# Patient Record
Sex: FEMALE | Race: WHITE | NOT HISPANIC OR LATINO | Employment: OTHER | URBAN - NONMETROPOLITAN AREA
[De-identification: names, ages, dates, MRNs, and addresses within clinical notes are randomized per-mention and may not be internally consistent; named-entity substitution may affect disease eponyms.]

---

## 2018-05-25 ENCOUNTER — OFFICE VISIT (OUTPATIENT)
Dept: GASTROENTEROLOGY | Facility: CLINIC | Age: 83
End: 2018-05-25

## 2018-05-25 VITALS
DIASTOLIC BLOOD PRESSURE: 80 MMHG | OXYGEN SATURATION: 96 % | HEART RATE: 72 BPM | SYSTOLIC BLOOD PRESSURE: 157 MMHG | HEIGHT: 66 IN | BODY MASS INDEX: 32.69 KG/M2 | WEIGHT: 203.4 LBS

## 2018-05-25 DIAGNOSIS — K52.832 LYMPHOCYTIC COLITIS: Primary | ICD-10-CM

## 2018-05-25 DIAGNOSIS — R12 HEARTBURN: ICD-10-CM

## 2018-05-25 DIAGNOSIS — R19.7 DIARRHEA, UNSPECIFIED TYPE: ICD-10-CM

## 2018-05-25 PROCEDURE — 99204 OFFICE O/P NEW MOD 45 MIN: CPT | Performed by: PHYSICIAN ASSISTANT

## 2018-05-25 RX ORDER — ERGOCALCIFEROL 1.25 MG/1
50000 CAPSULE ORAL WEEKLY
COMMUNITY

## 2018-05-25 RX ORDER — LISINOPRIL 20 MG/1
TABLET ORAL
COMMUNITY
Start: 2013-05-24

## 2018-05-25 RX ORDER — PIOGLITAZONEHYDROCHLORIDE 15 MG/1
15 TABLET ORAL DAILY
COMMUNITY

## 2018-05-25 RX ORDER — ALLOPURINOL 300 MG/1
TABLET ORAL
COMMUNITY
Start: 2012-11-21

## 2018-05-25 RX ORDER — ASPIRIN 325 MG
325 TABLET ORAL DAILY
COMMUNITY

## 2018-05-25 RX ORDER — GLIPIZIDE 5 MG/1
5 TABLET ORAL
COMMUNITY

## 2018-05-25 RX ORDER — SERTRALINE HYDROCHLORIDE 100 MG/1
TABLET, FILM COATED ORAL
COMMUNITY
Start: 2013-01-22

## 2018-05-25 RX ORDER — BUDESONIDE 3 MG/1
CAPSULE, COATED PELLETS ORAL
Qty: 154 CAPSULE | Refills: 0 | Status: SHIPPED | OUTPATIENT
Start: 2018-05-25 | End: 2018-06-04

## 2018-05-25 RX ORDER — OMEPRAZOLE 20 MG/1
20 CAPSULE, DELAYED RELEASE ORAL DAILY
COMMUNITY
End: 2018-05-25 | Stop reason: SINTOL

## 2018-05-25 RX ORDER — CARVEDILOL 12.5 MG/1
TABLET ORAL 2 TIMES DAILY
COMMUNITY
Start: 2013-03-19

## 2018-05-25 RX ORDER — RANITIDINE 300 MG/1
300 TABLET ORAL 2 TIMES DAILY PRN
Qty: 60 TABLET | Refills: 5 | Status: SHIPPED | OUTPATIENT
Start: 2018-05-25 | End: 2020-11-09

## 2018-05-25 RX ORDER — AMLODIPINE BESYLATE 5 MG/1
TABLET ORAL DAILY
COMMUNITY
Start: 2013-01-10

## 2018-05-25 NOTE — PROGRESS NOTES
Chief Complaint   Patient presents with   • Diarrhea       Tsering Alberto is a 83 y.o. female who presents to the office today as a new patient for evaluation of Diarrhea.    HPI  Patient reports diarrhea has been present for the past 6-8 months. She has noticed that some foods such as lettuce and green onions make her symptoms worse and also cause bloating. Some mornings, she has severe diarrhea with urgency. This problem has been getting progressively worse. Her sister passed away in the Fall of 2017 and her  passed away with cancer 2 years ago. She relates her worsening of symptoms to stress. She also reports that her stools are occurring every day. Her stools are sometimes with gas at first then watery stools, #7 on the Miner Stool Scale 1-4 times per day. Stools are malodorous and yellow in color, she has seen undigested food there in the past. There has also been mucous in her stool. No rectal burning. Still has gallbladder. She will take Imodium for relief of diarrhea, up to 1 pill per day. No blood has been seen in stool. She reports that Dr. Nielson completed sigmoidoscopy and then later had barium enema which were reported as normal in Oct 2017. There is no known family history of colon cancer or colon polyps. No family history of GI disease. Patient has history of diverticulosis but no personal history of colon polyps. She was given Bentyl and Pepto Bismol in the past which did not seem to help. Also currently taking Probiotics.    Denies abdominal pain, bloating or belching. Appetite is good. Takes Prilosec as needed for heartburn.     Review of Systems   Constitutional: Positive for fatigue. Negative for chills and fever.   HENT: Negative for congestion, sore throat, trouble swallowing and voice change.    Eyes: Negative.    Respiratory: Positive for shortness of breath. Negative for cough and wheezing.    Cardiovascular: Positive for leg swelling. Negative for chest pain and palpitations.    Gastrointestinal: Positive for diarrhea. Negative for abdominal distention, abdominal pain, anal bleeding, blood in stool, constipation, nausea, rectal pain and vomiting.   Endocrine: Positive for heat intolerance. Negative for cold intolerance.   Genitourinary: Negative for difficulty urinating and pelvic pain.   Musculoskeletal: Negative for arthralgias, back pain and myalgias.   Skin: Negative for rash and wound.   Allergic/Immunologic: Positive for environmental allergies. Negative for food allergies.   Neurological: Negative for dizziness and headaches.   Hematological: Bruises/bleeds easily.   Psychiatric/Behavioral: Negative for sleep disturbance. The patient is nervous/anxious.      Past Medical History:   Diagnosis Date   • Diabetes mellitus    • Hypertension      Past Surgical History:   Procedure Laterality Date   • CARDIAC SURGERY     • CATARACT EXTRACTION     • COLONOSCOPY     • REPLACEMENT TOTAL KNEE       Family History   Problem Relation Age of Onset   • Ovarian cancer Sister    • Breast cancer Other      Social History   Substance Use Topics   • Smoking status: Never Smoker   • Smokeless tobacco: Never Used   • Alcohol use No       CURRENT MEDICATION:  •  allopurinol (ZYLOPRIM) 300 MG tablet, Take  by mouth., Disp: , Rfl:   •  amLODIPine (NORVASC) 5 MG tablet, Take  by mouth Daily., Disp: , Rfl:   •  aspirin 325 MG tablet, Take 325 mg by mouth Daily., Disp: , Rfl:   •  carvedilol (COREG) 12.5 MG tablet, Take  by mouth 2 (Two) Times a Day., Disp: , Rfl:   •  glipiZIDE (GLUCOTROL) 5 MG tablet, Take 5 mg by mouth 2 (Two) Times a Day Before Meals., Disp: , Rfl:   •  lisinopril (PRINIVIL,ZESTRIL) 20 MG tablet, Take  by mouth., Disp: , Rfl:   •  pioglitazone (ACTOS) 15 MG tablet, Take 15 mg by mouth Daily., Disp: , Rfl:   •  sertraline (ZOLOFT) 100 MG tablet, Take  by mouth., Disp: , Rfl:   •  vitamin D (ERGOCALCIFEROL) 26844 units capsule capsule, Take 50,000 Units by mouth 1 (One) Time Per Week.,  "Disp: , Rfl:     ALLERGIES:  Penicillins    VISIT VITALS:  /80   Pulse 72   Ht 166.4 cm (65.5\")   Wt 92.3 kg (203 lb 6.4 oz)   SpO2 96%   BMI 33.33 kg/m²      Physical Exam   Constitutional: She is oriented to person, place, and time. She appears well-developed and well-nourished. No distress.   HENT:   Head: Normocephalic and atraumatic.   Nose: Nose normal.   Mouth/Throat: Oropharynx is clear and moist.   Eyes: Conjunctivae are normal. Right eye exhibits no discharge. Left eye exhibits no discharge. No scleral icterus.   Neck: Normal range of motion. No JVD present.   Cardiovascular: Normal rate, regular rhythm and normal heart sounds.  Exam reveals no gallop and no friction rub.    No murmur heard.  Pulmonary/Chest: Effort normal and breath sounds normal. No respiratory distress. She has no wheezes. She has no rales. She exhibits no tenderness.   Abdominal: Soft. Bowel sounds are normal. She exhibits no mass. There is no tenderness.   Musculoskeletal: Normal range of motion. She exhibits deformity (kyphosis). She exhibits no edema.   Neurological: She is alert and oriented to person, place, and time. Coordination normal.   Skin: Skin is warm and dry. No rash noted. She is not diaphoretic. No erythema.   Psychiatric: She has a normal mood and affect. Her behavior is normal. Judgment and thought content normal.   Vitals reviewed.      Assessment/Plan     1. Lymphocytic colitis    2. Diarrhea, unspecified type    3. Heartburn      Patient's operative note and pathology were requested and reviewed. Her random colon biopsies showed microscopic colitis (lymphocytic). She will start appropriate treatment with budesonide taper for 8 weeks. If diarrhea continues, she can be placed on long term maintenance dose of Budesonide 3 mg once daily. She will call with concerns. Information on this condition has been given to her.     Because PPIs can contribute to this condition, I have asked her to stop Prilosec and " start Zantac only as needed up to 2 times per day for relief of heartburn.     New Medications Ordered This Visit   Medications   • Budesonide (ENTOCORT EC) 3 MG 24 hr capsule     Sig: 3 tablets by mouth daily for 6 weeks, then 2 tablets by mouth daily for 2 weeks     Dispense:  154 capsule     Refill:  0   • raNITIdine (ZANTAC) 300 MG tablet     Sig: Take 1 tablet by mouth 2 (Two) Times a Day As Needed for Indigestion or Heartburn.     Dispense:  60 tablet     Refill:  5             Return in about 2 months (around 7/25/2018) for recheck colitis.      Electronically signed 5/25/2018 1:58 PM  Ese Guerin PA-C, [facility]

## 2018-05-29 ENCOUNTER — TELEPHONE (OUTPATIENT)
Dept: GASTROENTEROLOGY | Facility: CLINIC | Age: 83
End: 2018-05-29

## 2018-05-29 NOTE — TELEPHONE ENCOUNTER
Patient called this morning and said the Budesonide that you had prescribed her is way to expensive. She wanted to know if she could have something else called in?    Please and Thank you

## 2018-05-30 ENCOUNTER — TELEPHONE (OUTPATIENT)
Dept: GASTROENTEROLOGY | Facility: CLINIC | Age: 83
End: 2018-05-30

## 2018-05-30 RX ORDER — MESALAMINE 0.38 G/1
CAPSULE, EXTENDED RELEASE ORAL
Qty: 120 CAPSULE | Refills: 5 | Status: SHIPPED | OUTPATIENT
Start: 2018-05-30 | End: 2018-10-05

## 2018-06-01 ENCOUNTER — DOCUMENTATION (OUTPATIENT)
Dept: GASTROENTEROLOGY | Facility: CLINIC | Age: 83
End: 2018-06-01

## 2018-06-01 NOTE — PROGRESS NOTES
Patient called stating medication that was prescribed to her, Budesonide, was not helping diarrhea. She took one dose 5/30/18 and daily dose 5/31/18 and one dose today. I told her maybe it had not had time to start working and maybe she should wait and call back on Monday and let us know how it was working.

## 2018-06-04 ENCOUNTER — TELEPHONE (OUTPATIENT)
Dept: GASTROENTEROLOGY | Facility: CLINIC | Age: 83
End: 2018-06-04

## 2018-06-04 DIAGNOSIS — K52.832 LYMPHOCYTIC COLITIS: Primary | ICD-10-CM

## 2018-06-04 NOTE — TELEPHONE ENCOUNTER
Patient called and stated that the medicine you gave her for diarrhea isn't working. She stated that she is still having really bad diarrhea and it is waking her up through the night.

## 2018-06-04 NOTE — TELEPHONE ENCOUNTER
I called and spoke with patient. She could not afford $400 co-pay for the Budesonide. She was given Apriso and has been taking 4 pills per day but all throughout the day. I asked her to take the 4 pills all together 1 time daily then call back in a couple of days.

## 2018-06-04 NOTE — TELEPHONE ENCOUNTER
Sent Uceris for patient as another option. Called pharmacy and they said this name brand was not on her formulary and not covered. MA- can we look into patient assistance for Uceris for patient? She said she would be willing to apply.

## 2018-06-07 NOTE — TELEPHONE ENCOUNTER
Spoke with patient and let her know that insurance denied uceris. I told her I had patient assistance forms filled out and all she needed to do was come in and sign them. She stated that she would come in today or tomorrow to sign them. She stated that she is still getting up in the middle of the night to have a BM. I told her that hopefully when she gets this medicine she will feel better.

## 2018-06-15 ENCOUNTER — TELEPHONE (OUTPATIENT)
Dept: GASTROENTEROLOGY | Facility: CLINIC | Age: 83
End: 2018-06-15

## 2018-06-15 DIAGNOSIS — K52.832 LYMPHOCYTIC COLITIS: Primary | ICD-10-CM

## 2018-06-15 RX ORDER — CHOLESTYRAMINE 4 G/9G
4 POWDER, FOR SUSPENSION ORAL 2 TIMES DAILY
Qty: 378 G | Refills: 11 | Status: SHIPPED | OUTPATIENT
Start: 2018-06-15 | End: 2018-10-05

## 2018-06-15 NOTE — TELEPHONE ENCOUNTER
Ese, patient called checking on the status of patient assist. For Uceris. I called and they told me it should be completed in the next 24-48 hours , patient says she is miserable w/ mucus in stools,diarrhea. Says she cannot go on like this. She said the Apriso did not help her at all. Are there any samples of uceris or something else she can try while she is waiting on a decision from pt assist?

## 2018-06-15 NOTE — TELEPHONE ENCOUNTER
I have sent cholestyramine to pharmacy. Have her take this separate from all of her other medications.

## 2018-06-25 ENCOUNTER — OFFICE VISIT (OUTPATIENT)
Dept: GASTROENTEROLOGY | Facility: CLINIC | Age: 83
End: 2018-06-25

## 2018-06-25 VITALS
SYSTOLIC BLOOD PRESSURE: 148 MMHG | WEIGHT: 194 LBS | OXYGEN SATURATION: 99 % | HEART RATE: 70 BPM | DIASTOLIC BLOOD PRESSURE: 72 MMHG | BODY MASS INDEX: 30.45 KG/M2 | HEIGHT: 67 IN

## 2018-06-25 DIAGNOSIS — K52.9 NONSPECIFIC COLITIS: Primary | ICD-10-CM

## 2018-06-25 DIAGNOSIS — R19.7 DIARRHEA, UNSPECIFIED TYPE: ICD-10-CM

## 2018-06-25 PROCEDURE — 99214 OFFICE O/P EST MOD 30 MIN: CPT | Performed by: PHYSICIAN ASSISTANT

## 2018-07-11 ENCOUNTER — TELEPHONE (OUTPATIENT)
Dept: GASTROENTEROLOGY | Facility: CLINIC | Age: 83
End: 2018-07-11

## 2018-07-16 ENCOUNTER — TELEPHONE (OUTPATIENT)
Dept: GASTROENTEROLOGY | Facility: CLINIC | Age: 83
End: 2018-07-16

## 2018-07-16 NOTE — TELEPHONE ENCOUNTER
La from patients insurance called needing to do a peer to peer for Uceris. Her direct line is [phone]. Thank  You.

## 2018-07-18 NOTE — TELEPHONE ENCOUNTER
Do we have more samples to give to the patient?? I cannot get in contact to complete peer to peer. They said deadline was today.

## 2018-07-18 NOTE — TELEPHONE ENCOUNTER
Dr. Kong called about peer to peer and left a message this morning. I called back and when I pressed 2 for provider, I was disconnected several times.

## 2018-07-19 ENCOUNTER — TELEPHONE (OUTPATIENT)
Dept: GASTROENTEROLOGY | Facility: CLINIC | Age: 83
End: 2018-07-19

## 2018-07-20 NOTE — TELEPHONE ENCOUNTER
Please call [facility] rep to ask for more samples if possible to give patient by her next appt.

## 2018-08-02 ENCOUNTER — OFFICE VISIT (OUTPATIENT)
Dept: GASTROENTEROLOGY | Facility: CLINIC | Age: 83
End: 2018-08-02

## 2018-08-02 VITALS
SYSTOLIC BLOOD PRESSURE: 144 MMHG | HEART RATE: 72 BPM | HEIGHT: 67 IN | OXYGEN SATURATION: 96 % | DIASTOLIC BLOOD PRESSURE: 74 MMHG | WEIGHT: 198.8 LBS | BODY MASS INDEX: 31.2 KG/M2

## 2018-08-02 DIAGNOSIS — R19.7 DIARRHEA, UNSPECIFIED TYPE: ICD-10-CM

## 2018-08-02 DIAGNOSIS — K52.839 MICROSCOPIC COLITIS, UNSPECIFIED MICROSCOPIC COLITIS TYPE: Primary | ICD-10-CM

## 2018-08-02 PROCEDURE — 99214 OFFICE O/P EST MOD 30 MIN: CPT | Performed by: PHYSICIAN ASSISTANT

## 2018-09-06 ENCOUNTER — OFFICE VISIT (OUTPATIENT)
Dept: GASTROENTEROLOGY | Facility: CLINIC | Age: 83
End: 2018-09-06

## 2018-09-06 VITALS
SYSTOLIC BLOOD PRESSURE: 168 MMHG | DIASTOLIC BLOOD PRESSURE: 77 MMHG | HEIGHT: 67 IN | WEIGHT: 202 LBS | BODY MASS INDEX: 31.71 KG/M2 | OXYGEN SATURATION: 96 % | HEART RATE: 75 BPM

## 2018-09-06 DIAGNOSIS — R19.7 DIARRHEA, UNSPECIFIED TYPE: ICD-10-CM

## 2018-09-06 DIAGNOSIS — K52.839 MICROSCOPIC COLITIS, UNSPECIFIED MICROSCOPIC COLITIS TYPE: Primary | ICD-10-CM

## 2018-09-06 PROBLEM — K59.00 CONSTIPATION: Status: ACTIVE | Noted: 2018-09-06

## 2018-09-06 PROCEDURE — 99212 OFFICE O/P EST SF 10 MIN: CPT | Performed by: PHYSICIAN ASSISTANT

## 2018-09-06 NOTE — PROGRESS NOTES
Chief Complaint   Patient presents with   • colitis       Tsering Alberto is a 83 y.o. female who presents to the office today for follow up appointment for colitis  .    HPI    The patient was seen for a follow up visit of colitis.  She will complete her Uceris 9 mg tx next week and then she will continue on 6 mg.  Patient denies all GI symptoms.  She reports that she is feeling great.       Review of Systems   Constitutional: Negative for fatigue.   HENT: Negative for trouble swallowing.    Eyes: Negative for visual disturbance.   Respiratory: Positive for shortness of breath.    Cardiovascular: Negative for chest pain.   Gastrointestinal: Negative for abdominal distention, abdominal pain, anal bleeding, blood in stool, constipation, diarrhea, nausea, rectal pain and vomiting.   Endocrine: Positive for heat intolerance.   Genitourinary: Negative.    Musculoskeletal: Negative for arthralgias, back pain and myalgias.   Skin: Negative.    Allergic/Immunologic: Negative for food allergies.   Neurological: Positive for light-headedness. Negative for dizziness.   Hematological: Bruises/bleeds easily.   Psychiatric/Behavioral: The patient is nervous/anxious.        ACTIVE PROBLEMS:   Specialty Problems        Gastroenterology Problems    Nonspecific colitis        Microscopic colitis              PAST MEDICAL HISTORY:  Past Medical History:   Diagnosis Date   • Diabetes mellitus (CMS/HCC)    • Hypertension        SURGICAL HISTORY:  Past Surgical History:   Procedure Laterality Date   • CARDIAC SURGERY     • CATARACT EXTRACTION     • COLONOSCOPY     • REPLACEMENT TOTAL KNEE         FAMILY HISTORY:  Family History   Problem Relation Age of Onset   • Ovarian cancer Sister    • Breast cancer Other        SOCIAL HISTORY:  Social History   Substance Use Topics   • Smoking status: Never Smoker   • Smokeless tobacco: Never Used   • Alcohol use No       CURRENT MEDICATION:    Current Outpatient Prescriptions:   •  allopurinol  "(ZYLOPRIM) 300 MG tablet, Take  by mouth., Disp: , Rfl:   •  amLODIPine (NORVASC) 5 MG tablet, Take  by mouth Daily., Disp: , Rfl:   •  aspirin 325 MG tablet, Take 325 mg by mouth Daily., Disp: , Rfl:   •  Budesonide (UCERIS) 9 MG tablet sustained-release 24 hour, Take 9 mg by mouth Daily., Disp: 30 tablet, Rfl: 2  •  carvedilol (COREG) 12.5 MG tablet, Take  by mouth 2 (Two) Times a Day., Disp: , Rfl:   •  cholestyramine (QUESTRAN) 4 GM/DOSE powder, Take 1 packet by mouth 2 (Two) Times a Day. mixed with a liquid, Disp: 378 g, Rfl: 11  •  glipiZIDE (GLUCOTROL) 5 MG tablet, Take 5 mg by mouth 2 (Two) Times a Day Before Meals., Disp: , Rfl:   •  lisinopril (PRINIVIL,ZESTRIL) 20 MG tablet, Take  by mouth., Disp: , Rfl:   •  mesalamine (APRISO) 0.375 g 24 hr capsule, Take 4 capsules daily, Disp: 120 capsule, Rfl: 5  •  pioglitazone (ACTOS) 15 MG tablet, Take 15 mg by mouth Daily., Disp: , Rfl:   •  raNITIdine (ZANTAC) 300 MG tablet, Take 1 tablet by mouth 2 (Two) Times a Day As Needed for Indigestion or Heartburn., Disp: 60 tablet, Rfl: 5  •  sertraline (ZOLOFT) 100 MG tablet, Take  by mouth., Disp: , Rfl:   •  vitamin D (ERGOCALCIFEROL) 72329 units capsule capsule, Take 50,000 Units by mouth 1 (One) Time Per Week., Disp: , Rfl:     ALLERGIES:  Penicillins    VISIT VITALS:  /77   Pulse 75   Ht 170.2 cm (67\")   Wt 91.6 kg (202 lb)   SpO2 96%   BMI 31.64 kg/m²     Physical Exam   Constitutional: She is oriented to person, place, and time. She appears well-developed and well-nourished. No distress.   HENT:   Head: Normocephalic and atraumatic.   Right Ear: External ear normal.   Left Ear: External ear normal.   Nose: Nose normal.   Mouth/Throat: Oropharynx is clear and moist. No oropharyngeal exudate.   Eyes: Pupils are equal, round, and reactive to light. Conjunctivae and EOM are normal. Right eye exhibits no discharge. Left eye exhibits no discharge. No scleral icterus.   Neck: Normal range of motion. Neck " supple. No JVD present. No tracheal deviation present. No thyromegaly present.   Cardiovascular: Normal rate, regular rhythm, normal heart sounds and intact distal pulses.  Exam reveals no gallop and no friction rub.    No murmur heard.  Pulmonary/Chest: Effort normal and breath sounds normal. No stridor. No respiratory distress. She has no wheezes. She has no rales. She exhibits no tenderness.   Abdominal: Soft. Bowel sounds are normal. She exhibits no distension and no mass. There is no tenderness. There is no rebound and no guarding. No hernia.   Genitourinary: Rectal exam shows guaiac negative stool.   Musculoskeletal: She exhibits no edema, tenderness or deformity.   Lymphadenopathy:     She has no cervical adenopathy.   Neurological: She is alert and oriented to person, place, and time. She has normal reflexes. She displays normal reflexes. No cranial nerve deficit. She exhibits normal muscle tone. Coordination normal.   Skin: Skin is warm and dry. No rash noted. She is not diaphoretic. No erythema. No pallor.   Psychiatric: She has a normal mood and affect. Her behavior is normal. Judgment and thought content normal.       Assessment/Plan      Diagnosis Plan   1. Microscopic colitis, unspecified microscopic colitis type     2. Diarrhea, unspecified type       The patient is doing well with Uceris treatment.  She would like to remain on the 9 mg Uceris for longer due to the positive change she has had in her life.  We will extend the Uceris for another month.  She voiced understanding and agreement.    Return in about 1 month (around 10/6/2018) for Recheck.         Patient's Body mass index is 31.64 kg/m². BMI is above normal parameters. Recommendations include: educational material.      SEAN Rodriguez

## 2018-09-11 ENCOUNTER — TELEPHONE (OUTPATIENT)
Dept: GASTROENTEROLOGY | Facility: CLINIC | Age: 83
End: 2018-09-11

## 2018-09-11 NOTE — TELEPHONE ENCOUNTER
Ana Pauladallas asked that I call patient and make sure that she knew to keep taking Uceris 9mg.I tried to call patient but there was no answer and no way to leave a message.

## 2018-10-05 ENCOUNTER — OFFICE VISIT (OUTPATIENT)
Dept: GASTROENTEROLOGY | Facility: CLINIC | Age: 83
End: 2018-10-05

## 2018-10-05 VITALS
HEART RATE: 71 BPM | DIASTOLIC BLOOD PRESSURE: 75 MMHG | BODY MASS INDEX: 31.71 KG/M2 | OXYGEN SATURATION: 90 % | HEIGHT: 67 IN | WEIGHT: 202 LBS | SYSTOLIC BLOOD PRESSURE: 167 MMHG

## 2018-10-05 DIAGNOSIS — K52.832 LYMPHOCYTIC COLITIS: Primary | ICD-10-CM

## 2018-10-05 PROCEDURE — 99213 OFFICE O/P EST LOW 20 MIN: CPT | Performed by: PHYSICIAN ASSISTANT

## 2018-10-05 RX ORDER — BUDESONIDE 3 MG/1
3 CAPSULE, COATED PELLETS ORAL EVERY MORNING
Qty: 30 CAPSULE | Refills: 3 | Status: SHIPPED | OUTPATIENT
Start: 2018-10-05 | End: 2018-10-24 | Stop reason: SDUPTHER

## 2018-10-05 RX ORDER — BUDESONIDE 9 MG/1
9 TABLET, FILM COATED, EXTENDED RELEASE ORAL DAILY
Qty: 30 TABLET | Refills: 1 | Status: SHIPPED | OUTPATIENT
Start: 2018-10-05 | End: 2018-11-16 | Stop reason: SDUPTHER

## 2018-10-05 NOTE — PROGRESS NOTES
: 1935    Chief Complaint   Patient presents with   • Lymphocytic Colitis       Tsering Alberto is a 83 y.o. female who presents to the office today as a follow up appointment regarding Lymphocytic Colitis.    History of Present Illness:  Today, she states that she is feeling great and her symptoms of severe diarrhea are much improved with Uceris 9 mg once daily for treatment of microscopic (lymphocytic colitis).  BMs are occurring 1 time daily now and are soft and formed. Fecal urgency resolved as well as fecal incontinence. Denies current abdominal pain, nausea or vomiting. Appetite is good and denies any recent weight loss. She has occasionally skipped 1 day between doses of Uceris. She has not tried to discontinue this medication yet.  She has been taking this medication once daily since 2018 and is getting it free of cost through the company via financial assistance. She tried previously to get Entocort 3 mg tablet on taper schedule but this was too expensive for her and the appeal was denied by her insurance. She has tried Apriso, cholestyramine, Bentyl and Pepto Bismol in the past which did not seem to help. Also currently taking Probiotics.    She reports that Dr. Nielson completed sigmoidoscopy (path showed microscopic colitis) and then later had barium enema which was reported as normal in Oct 2017. There is no known family history of colon cancer or colon polyps. No family history of GI disease. Patient has history of diverticulosis but no personal history of colon polyps.    Review of Systems   Constitutional: Negative for fatigue.   HENT: Negative for trouble swallowing.    Eyes: Negative for visual disturbance.   Respiratory: Positive for shortness of breath.    Cardiovascular: Negative for chest pain.   Gastrointestinal: Negative for abdominal distention, abdominal pain, anal bleeding, blood in stool, constipation, diarrhea, nausea, rectal pain and vomiting.   Endocrine: Positive for heat  intolerance.   Genitourinary: Negative.    Musculoskeletal: Negative for arthralgias, back pain and myalgias.   Skin: Negative.    Allergic/Immunologic: Negative for food allergies.   Neurological: Positive for light-headedness. Negative for dizziness.   Hematological: Bruises/bleeds easily.   Psychiatric/Behavioral: The patient is nervous/anxious.        Past Medical History:   Diagnosis Date   • Diabetes mellitus (CMS/HCC)    • Hypertension        Past Surgical History:   Procedure Laterality Date   • CARDIAC SURGERY     • CATARACT EXTRACTION     • COLONOSCOPY     • REPLACEMENT TOTAL KNEE         Family History   Problem Relation Age of Onset   • Ovarian cancer Sister    • Breast cancer Other        Social History     Social History   • Marital status: Unknown     Social History Main Topics   • Smoking status: Never Smoker   • Smokeless tobacco: Never Used   • Alcohol use No   • Drug use: Unknown     Other Topics Concern   • Not on file       Current Outpatient Prescriptions:   •  allopurinol (ZYLOPRIM) 300 MG tablet, Take  by mouth., Disp: , Rfl:   •  amLODIPine (NORVASC) 5 MG tablet, Take  by mouth Daily., Disp: , Rfl:   •  aspirin 325 MG tablet, Take 325 mg by mouth Daily., Disp: , Rfl:   •  Budesonide (UCERIS) 9 MG tablet sustained-release 24 hour, Take 9 mg by mouth Daily., Disp: 30 tablet, Rfl: 2  •  carvedilol (COREG) 12.5 MG tablet, Take  by mouth 2 (Two) Times a Day., Disp: , Rfl:   •  glipiZIDE (GLUCOTROL) 5 MG tablet, Take 5 mg by mouth 2 (Two) Times a Day Before Meals., Disp: , Rfl:   •  lisinopril (PRINIVIL,ZESTRIL) 20 MG tablet, Take  by mouth., Disp: , Rfl:   •  pioglitazone (ACTOS) 15 MG tablet, Take 15 mg by mouth Daily., Disp: , Rfl:   •  raNITIdine (ZANTAC) 300 MG tablet, Take 1 tablet by mouth 2 (Two) Times a Day As Needed for Indigestion or Heartburn., Disp: 60 tablet, Rfl: 5  •  sertraline (ZOLOFT) 100 MG tablet, Take  by mouth., Disp: , Rfl:   •  vitamin D (ERGOCALCIFEROL) 05960 units  "capsule capsule, Take 50,000 Units by mouth 1 (One) Time Per Week., Disp: , Rfl:     Allergies:   Penicillins    Vitals:  /75 (BP Location: Right arm, Patient Position: Sitting, Cuff Size: Adult)   Pulse 71   Ht 170.2 cm (67\")   Wt 91.6 kg (202 lb)   SpO2 90%   BMI 31.64 kg/m²     Physical Exam   Constitutional: She is oriented to person, place, and time. She appears well-developed and well-nourished. No distress.   HENT:   Head: Normocephalic and atraumatic.   Nose: Nose normal.   Mouth/Throat: Oropharynx is clear and moist.   Eyes: Conjunctivae are normal. Right eye exhibits no discharge. Left eye exhibits no discharge. No scleral icterus.   Neck: Normal range of motion. No JVD present.   Cardiovascular: Normal rate, regular rhythm and normal heart sounds.  Exam reveals no gallop and no friction rub.    No murmur heard.  Pulmonary/Chest: Effort normal and breath sounds normal. No respiratory distress. She has no wheezes. She has no rales. She exhibits no tenderness.   Abdominal: Soft. Bowel sounds are normal. She exhibits no mass. There is no tenderness.   Musculoskeletal: She exhibits edema (mild ansarca). She exhibits no deformity.   Slow gait, ambulates with cane.   Neurological: She is alert and oriented to person, place, and time. Coordination normal.   Skin: Skin is warm and dry. No rash noted. She is not diaphoretic. No erythema.   Scattered ecchymosis bilateral upper extremities   Psychiatric: She has a normal mood and affect. Her behavior is normal. Judgment and thought content normal.   Vitals reviewed.      Assessment/Plan:  1. Lymphocytic colitis      She has had dramatic relief from severe diarrhea related to microscopic (lymphocytic colitis). She has been taking Budesonide for over 3 months. This exceeds treatment duration of 8 weeks and she has developed considerable swelling on my physical exam. Long term use of steroids can lead to swelling and also brittle bones along with other " complications. I would like her to be able to take the recommended maintenance dose of budesonide at 3 mg once daily. She will make attempt to discontinue Uceris completely for now. If she is unable to discontinue it, she will take 1 capsule every 2-3 days. She cannot afford Entocort at this time, we will investigate patient assistance for her.     New Medications Ordered This Visit   Medications   • Budesonide (ENTOCORT EC) 3 MG 24 hr capsule     Sig: Take 1 capsule by mouth Every Morning.     Dispense:  30 capsule     Refill:  3   • Budesonide ER (UCERIS) 9 MG tablet sustained-release 24 hour     Sig: Take 9 mg by mouth Daily.     Dispense:  30 tablet     Refill:  1           Return in about 3 months (around 1/5/2019) for recheck microscopic colitis.      Electronically signed 10/5/2018 4:50 PM  Ese Guerin PA-C, [facility]

## 2018-10-08 ENCOUNTER — TELEPHONE (OUTPATIENT)
Dept: GASTROENTEROLOGY | Facility: CLINIC | Age: 83
End: 2018-10-08

## 2018-10-08 DIAGNOSIS — K52.832 LYMPHOCYTIC COLITIS: ICD-10-CM

## 2018-10-16 ENCOUNTER — TELEPHONE (OUTPATIENT)
Dept: UROLOGY | Facility: CLINIC | Age: 83
End: 2018-10-16

## 2018-10-16 DIAGNOSIS — K52.832 LYMPHOCYTIC COLITIS: ICD-10-CM

## 2018-10-24 RX ORDER — BUDESONIDE 3 MG/1
3 CAPSULE, COATED PELLETS ORAL EVERY MORNING
Qty: 30 CAPSULE | Refills: 5 | Status: SHIPPED | OUTPATIENT
Start: 2018-10-24 | End: 2019-09-10 | Stop reason: SDUPTHER

## 2018-11-07 ENCOUNTER — TELEPHONE (OUTPATIENT)
Dept: GASTROENTEROLOGY | Facility: CLINIC | Age: 83
End: 2018-11-07

## 2018-11-07 NOTE — TELEPHONE ENCOUNTER
I do not have this paperwork. I faxed it in for patient assistance and put it in the scan folder. This was just faxed on 10/24/2018. They have 30 days I believe to respond.

## 2018-11-16 RX ORDER — BUDESONIDE 9 MG/1
9 TABLET, FILM COATED, EXTENDED RELEASE ORAL DAILY
Qty: 30 TABLET | Refills: 1 | Status: SHIPPED | OUTPATIENT
Start: 2018-11-16 | End: 2019-09-10

## 2019-09-09 ENCOUNTER — TELEPHONE (OUTPATIENT)
Dept: GASTROENTEROLOGY | Facility: CLINIC | Age: 84
End: 2019-09-09

## 2019-09-09 DIAGNOSIS — K52.832 LYMPHOCYTIC COLITIS: ICD-10-CM

## 2019-09-09 NOTE — TELEPHONE ENCOUNTER
Ese, patient called and said that she received a letter from the patient assistance program and she can't get any help with Uceris. She wanted to know if there is a generic for it? Or can she try something else?        Please and Thank you

## 2019-09-10 RX ORDER — BUDESONIDE 3 MG/1
3 CAPSULE, COATED PELLETS ORAL EVERY MORNING
Qty: 30 CAPSULE | Refills: 11 | Status: SHIPPED | OUTPATIENT
Start: 2019-09-10 | End: 2020-11-09 | Stop reason: SDUPTHER

## 2019-09-10 NOTE — TELEPHONE ENCOUNTER
We have tried the generic in the past and it was too expensive. She may want to consider trying again to go off of it. I will re-send budesonide, she can compare the prices.

## 2019-09-11 NOTE — TELEPHONE ENCOUNTER
Ese, I spoke with Tsering and she said the Entocort was still expensive. ($200.00). She said that if she had to, she would  Pay for it but, that's a lot of money for her.

## 2019-09-11 NOTE — TELEPHONE ENCOUNTER
May want to call pharmacy for her to see if there are any discounts that she can have. Otherwise, she will either need to pay for it or try to go off of it for a while. She could even try taking it every couple of days instead of daily in order to save money.

## 2020-11-09 ENCOUNTER — TELEPHONE (OUTPATIENT)
Dept: GASTROENTEROLOGY | Facility: CLINIC | Age: 85
End: 2020-11-09

## 2020-11-09 ENCOUNTER — OFFICE VISIT (OUTPATIENT)
Dept: GASTROENTEROLOGY | Facility: CLINIC | Age: 85
End: 2020-11-09

## 2020-11-09 VITALS
HEIGHT: 67 IN | DIASTOLIC BLOOD PRESSURE: 93 MMHG | TEMPERATURE: 98 F | SYSTOLIC BLOOD PRESSURE: 175 MMHG | HEART RATE: 76 BPM | WEIGHT: 209 LBS | OXYGEN SATURATION: 98 % | BODY MASS INDEX: 32.8 KG/M2

## 2020-11-09 DIAGNOSIS — K52.832 LYMPHOCYTIC COLITIS: Primary | ICD-10-CM

## 2020-11-09 PROCEDURE — 99213 OFFICE O/P EST LOW 20 MIN: CPT | Performed by: PHYSICIAN ASSISTANT

## 2020-11-09 RX ORDER — BUDESONIDE 3 MG/1
3 CAPSULE, COATED PELLETS ORAL EVERY MORNING
Qty: 30 CAPSULE | Refills: 11 | Status: SHIPPED | OUTPATIENT
Start: 2020-11-09

## 2020-11-09 NOTE — PROGRESS NOTES
: 1935    Chief Complaint   Patient presents with   • Diarrhea       Tsering Alberto is a 85 y.o. female with PMH of lymphocytic colitis who presents to the office today as a follow up appointment regarding Diarrhea.    History of Present Illness:  She has been feeling well over the past couple of years, has not been seen in our GI clinic since 10/2018. She has been taking Uceris 9 mg tab only as needed with great relief from diarrhea. She previously was prescribed budesonide 3 mg tablets but her insurance would not cover it and it was very expensive for her. She has daily stools, sometimes has sudden onset abdominal pain with diarrhea and fecal urgency more than 4 or 5 times daily. Has been taking what she had left of samples over the past couple of years.     Review of Systems   Constitutional: Positive for fatigue.   HENT: Negative for sore throat and trouble swallowing.    Eyes: Negative.    Respiratory: Negative for chest tightness.    Cardiovascular: Positive for leg swelling. Negative for chest pain.   Gastrointestinal: Negative for abdominal distention, abdominal pain, anal bleeding, blood in stool, constipation, diarrhea, nausea, rectal pain and vomiting.   Endocrine: Negative.    Genitourinary: Negative for difficulty urinating.   Musculoskeletal: Positive for back pain. Negative for neck pain.   Skin: Negative.    Allergic/Immunologic: Negative for environmental allergies and food allergies.   Neurological: Negative for dizziness and headaches.   Hematological: Bruises/bleeds easily.   Psychiatric/Behavioral: Positive for sleep disturbance.     I have reviewed and confirmed the accuracy of the ROS as documented by the MA/LPN/RN Ese Guerin PA-C    Past Medical History:   Diagnosis Date   • Diabetes mellitus (CMS/HCC)    • Hypertension        Past Surgical History:   Procedure Laterality Date   • CARDIAC SURGERY     • CATARACT EXTRACTION     • COLONOSCOPY     • REPLACEMENT TOTAL KNEE    "      Family History   Problem Relation Age of Onset   • Ovarian cancer Sister    • Breast cancer Other        Social History     Socioeconomic History   • Marital status: Unknown     Spouse name: Not on file   • Number of children: Not on file   • Years of education: Not on file   • Highest education level: Not on file   Tobacco Use   • Smoking status: Never Smoker   • Smokeless tobacco: Never Used   Substance and Sexual Activity   • Alcohol use: No       Current Outpatient Medications:   •  allopurinol (ZYLOPRIM) 300 MG tablet, Take  by mouth., Disp: , Rfl:   •  amLODIPine (NORVASC) 5 MG tablet, Take  by mouth Daily., Disp: , Rfl:   •  aspirin 325 MG tablet, Take 325 mg by mouth Daily., Disp: , Rfl:   •  Budesonide (UCeris) 9 mg once daily PRN diarrhea  •  carvedilol (COREG) 12.5 MG tablet, Take  by mouth 2 (Two) Times a Day., Disp: , Rfl:   •  glipiZIDE (GLUCOTROL) 5 MG tablet, Take 5 mg by mouth 2 (Two) Times a Day Before Meals., Disp: , Rfl:   •  lisinopril (PRINIVIL,ZESTRIL) 20 MG tablet, Take  by mouth., Disp: , Rfl:   •  pioglitazone (ACTOS) 15 MG tablet, Take 15 mg by mouth Daily., Disp: , Rfl:   •  sertraline (ZOLOFT) 100 MG tablet, Take  by mouth., Disp: , Rfl:   •  vitamin D (ERGOCALCIFEROL) 63318 units capsule capsule, Take 50,000 Units by mouth 1 (One) Time Per Week., Disp: , Rfl:     Allergies:   Penicillins    Vitals:  /93   Pulse 76   Temp 98 °F (36.7 °C)   Ht 170.2 cm (67\")   Wt 94.8 kg (209 lb)   SpO2 98%   BMI 32.73 kg/m²     Physical Exam  Vitals signs reviewed.   Constitutional:       General: She is not in acute distress.     Appearance: Normal appearance. She is well-developed. She is not ill-appearing or diaphoretic.   HENT:      Head: Normocephalic and atraumatic.      Right Ear: External ear normal.      Left Ear: External ear normal.      Nose: Nose normal.      Mouth/Throat:      Comments: Wearing a mask  Eyes:      General: No scleral icterus.        Right eye: No discharge. "         Left eye: No discharge.      Conjunctiva/sclera: Conjunctivae normal.   Neck:      Musculoskeletal: Normal range of motion.      Vascular: No JVD.   Cardiovascular:      Rate and Rhythm: Normal rate and regular rhythm.      Heart sounds: Normal heart sounds. No murmur. No friction rub. No gallop.    Pulmonary:      Effort: Pulmonary effort is normal. No respiratory distress.      Breath sounds: Normal breath sounds. No wheezing or rales.   Chest:      Chest wall: No tenderness.   Abdominal:      General: Bowel sounds are normal. There is no distension.      Palpations: Abdomen is soft. There is no mass.      Tenderness: There is no abdominal tenderness. There is no guarding.   Musculoskeletal: Normal range of motion.         General: No deformity.      Right lower leg: Edema present.      Left lower leg: Edema present.      Comments: Slow gait, ambulates with cane   Skin:     General: Skin is warm and dry.      Findings: No erythema or rash.   Neurological:      Mental Status: She is alert and oriented to person, place, and time.      Coordination: Coordination normal.   Psychiatric:         Mood and Affect: Mood normal.         Behavior: Behavior normal.         Thought Content: Thought content normal.         Judgment: Judgment normal.     Results Review:  Dr. Nielson completed sigmoidoscopy (path showed microscopic colitis) and then later had barium enema which was reported as normal in Oct 2017.    Assessment:  1. Lymphocytic colitis      Plan:  Maintenance dosing for microscopic colitis may be more affordable and just as effective for diarrhea relief. We no longer have Uceris samples in office. She should consider taking this medication daily or at least every other day for best relief.     New Medications Ordered This Visit   Medications   • Budesonide (Entocort EC) 3 MG 24 hr capsule     Sig: Take 1 capsule by mouth Every Morning.     Dispense:  30 capsule     Refill:  11           Return if symptoms  worsen or fail to improve.      Electronically signed 11/11/2020 13:50 MOOKIE Guerin PA-C  [facility]

## 2021-03-16 ENCOUNTER — IMMUNIZATION (OUTPATIENT)
Dept: VACCINE CLINIC | Facility: HOSPITAL | Age: 86
End: 2021-03-16

## 2021-03-16 PROCEDURE — 0001A: CPT | Performed by: INTERNAL MEDICINE

## 2021-03-16 PROCEDURE — 91300 HC SARSCOV02 VAC 30MCG/0.3ML IM: CPT | Performed by: INTERNAL MEDICINE

## 2021-04-06 ENCOUNTER — IMMUNIZATION (OUTPATIENT)
Dept: VACCINE CLINIC | Facility: HOSPITAL | Age: 86
End: 2021-04-06

## 2021-04-06 PROCEDURE — 0002A: CPT | Performed by: INTERNAL MEDICINE

## 2021-04-06 PROCEDURE — 91300 HC SARSCOV02 VAC 30MCG/0.3ML IM: CPT | Performed by: INTERNAL MEDICINE

## 2022-03-18 NOTE — PROGRESS NOTES
Chief Complaint   Patient presents with   • colitis       Tsering Alberto is a 83 y.o. female who presents to the office today for follow up appointment for colitis  .    HPI    The patient was seen for a follow up visit of colitis.  She was started on Uceris last visit and reports that it is completely controlling her symptoms.  She denies diarrhea, nausea, vomiting, abdominal pain, constipation, diarrhea, hematochezia and melena.        Review of Systems   Constitutional: Negative for fatigue.   HENT: Negative for trouble swallowing.    Eyes: Negative.    Respiratory: Negative.    Cardiovascular: Negative for chest pain.   Gastrointestinal: Negative for abdominal distention, abdominal pain, anal bleeding, blood in stool, constipation, diarrhea, nausea, rectal pain and vomiting.   Endocrine: Positive for heat intolerance. Negative for cold intolerance.   Genitourinary: Negative.    Musculoskeletal: Negative for arthralgias, back pain and myalgias.   Skin: Negative.    Allergic/Immunologic: Negative for environmental allergies.   Neurological: Positive for dizziness. Negative for light-headedness.   Hematological: Bruises/bleeds easily.   Psychiatric/Behavioral: Negative for sleep disturbance. The patient is nervous/anxious.        ACTIVE PROBLEMS:   Specialty Problems        Gastroenterology Problems    Nonspecific colitis              PAST MEDICAL HISTORY:  Past Medical History:   Diagnosis Date   • Diabetes mellitus (CMS/HCC)    • Hypertension        SURGICAL HISTORY:  Past Surgical History:   Procedure Laterality Date   • CARDIAC SURGERY     • CATARACT EXTRACTION     • COLONOSCOPY     • REPLACEMENT TOTAL KNEE         FAMILY HISTORY:  Family History   Problem Relation Age of Onset   • Ovarian cancer Sister    • Breast cancer Other        SOCIAL HISTORY:  Social History   Substance Use Topics   • Smoking status: Never Smoker   • Smokeless tobacco: Never Used   • Alcohol use No       CURRENT MEDICATION:    Current  "Outpatient Prescriptions:   •  allopurinol (ZYLOPRIM) 300 MG tablet, Take  by mouth., Disp: , Rfl:   •  amLODIPine (NORVASC) 5 MG tablet, Take  by mouth Daily., Disp: , Rfl:   •  aspirin 325 MG tablet, Take 325 mg by mouth Daily., Disp: , Rfl:   •  Budesonide (UCERIS) 9 MG tablet sustained-release 24 hour, Take 9 mg by mouth Daily., Disp: 30 tablet, Rfl: 2  •  Budesonide 9 MG tablet sustained-release 24 hour, Take 9 mg by mouth Daily., Disp: 28 tablet, Rfl: 1  •  carvedilol (COREG) 12.5 MG tablet, Take  by mouth 2 (Two) Times a Day., Disp: , Rfl:   •  cholestyramine (QUESTRAN) 4 GM/DOSE powder, Take 1 packet by mouth 2 (Two) Times a Day. mixed with a liquid, Disp: 378 g, Rfl: 11  •  glipiZIDE (GLUCOTROL) 5 MG tablet, Take 5 mg by mouth 2 (Two) Times a Day Before Meals., Disp: , Rfl:   •  lisinopril (PRINIVIL,ZESTRIL) 20 MG tablet, Take  by mouth., Disp: , Rfl:   •  mesalamine (APRISO) 0.375 g 24 hr capsule, Take 4 capsules daily, Disp: 120 capsule, Rfl: 5  •  pioglitazone (ACTOS) 15 MG tablet, Take 15 mg by mouth Daily., Disp: , Rfl:   •  raNITIdine (ZANTAC) 300 MG tablet, Take 1 tablet by mouth 2 (Two) Times a Day As Needed for Indigestion or Heartburn., Disp: 60 tablet, Rfl: 5  •  sertraline (ZOLOFT) 100 MG tablet, Take  by mouth., Disp: , Rfl:   •  vitamin D (ERGOCALCIFEROL) 45379 units capsule capsule, Take 50,000 Units by mouth 1 (One) Time Per Week., Disp: , Rfl:     ALLERGIES:  Penicillins    VISIT VITALS:  /74   Pulse 72   Ht 170.2 cm (67\")   Wt 90.2 kg (198 lb 12.8 oz)   SpO2 96%   BMI 31.14 kg/m²     Physical Exam   Constitutional: She is oriented to person, place, and time. She appears well-developed and well-nourished. No distress.   HENT:   Head: Normocephalic and atraumatic.   Right Ear: External ear normal.   Left Ear: External ear normal.   Nose: Nose normal.   Mouth/Throat: Oropharynx is clear and moist. No oropharyngeal exudate.   Eyes: Pupils are equal, round, and reactive to light. " Conjunctivae and EOM are normal. Right eye exhibits no discharge. Left eye exhibits no discharge. No scleral icterus.   Neck: Normal range of motion. Neck supple. No JVD present. No tracheal deviation present. No thyromegaly present.   Cardiovascular: Normal rate, regular rhythm, normal heart sounds and intact distal pulses.  Exam reveals no gallop and no friction rub.    No murmur heard.  Pulmonary/Chest: Effort normal and breath sounds normal. No stridor. No respiratory distress. She has no wheezes. She has no rales. She exhibits no tenderness.   Abdominal: Soft. Bowel sounds are normal. She exhibits no distension and no mass. There is no tenderness. There is no rebound and no guarding. No hernia.   Genitourinary: Rectal exam shows guaiac negative stool.   Musculoskeletal: She exhibits no edema, tenderness or deformity.   Lymphadenopathy:     She has no cervical adenopathy.   Neurological: She is alert and oriented to person, place, and time. She has normal reflexes. She displays normal reflexes. No cranial nerve deficit. She exhibits normal muscle tone. Coordination normal.   Skin: Skin is warm and dry. No rash noted. She is not diaphoretic. No erythema. No pallor.   Psychiatric: She has a normal mood and affect. Her behavior is normal. Judgment and thought content normal.       Assessment/Plan      Diagnosis Plan   1. Microscopic colitis, unspecified microscopic colitis type     2. Diarrhea, unspecified type       The patient will continue Uceris 9 mg until her 8 weeks has been completed.  She will drop to 6 mg x 2 weeks.  Patient will return in 6 weeks.    Return in about 8 weeks (around 9/27/2018) for Recheck.         Patient's Body mass index is 31.14 kg/m². BMI is above normal parameters. Recommendations include: educational material.      SEAN Rodriguez   Cephalexin Pregnancy And Lactation Text: This medication is Pregnancy Category B and considered safe during pregnancy.  It is also excreted in breast milk but can be used safely for shorter doses.

## 2023-02-22 ENCOUNTER — TRANSCRIBE ORDERS (OUTPATIENT)
Dept: ADMINISTRATIVE | Facility: HOSPITAL | Age: 88
End: 2023-02-22
Payer: MEDICARE

## 2023-02-22 ENCOUNTER — LAB (OUTPATIENT)
Dept: LAB | Facility: HOSPITAL | Age: 88
End: 2023-02-22
Payer: MEDICARE

## 2023-02-22 DIAGNOSIS — R30.0 DYSURIA: Primary | ICD-10-CM

## 2023-02-22 DIAGNOSIS — R30.0 DYSURIA: ICD-10-CM

## 2023-02-22 PROCEDURE — 81001 URINALYSIS AUTO W/SCOPE: CPT

## 2023-02-22 PROCEDURE — 87086 URINE CULTURE/COLONY COUNT: CPT

## 2023-02-23 LAB
BACTERIA UR QL AUTO: ABNORMAL /HPF
BILIRUB UR QL STRIP: NEGATIVE
CLARITY UR: ABNORMAL
COLOR UR: YELLOW
GLUCOSE UR STRIP-MCNC: ABNORMAL MG/DL
HGB UR QL STRIP.AUTO: ABNORMAL
HYALINE CASTS UR QL AUTO: ABNORMAL /LPF
KETONES UR QL STRIP: NEGATIVE
LEUKOCYTE ESTERASE UR QL STRIP.AUTO: ABNORMAL
NITRITE UR QL STRIP: NEGATIVE
PH UR STRIP.AUTO: 5.5 [PH] (ref 5–8)
PROT UR QL STRIP: ABNORMAL
RBC # UR STRIP: ABNORMAL /HPF
REF LAB TEST METHOD: ABNORMAL
SP GR UR STRIP: 1.02 (ref 1–1.03)
SQUAMOUS #/AREA URNS HPF: ABNORMAL /HPF
UROBILINOGEN UR QL STRIP: ABNORMAL
WBC # UR STRIP: ABNORMAL /HPF
YEAST URNS QL MICRO: ABNORMAL /HPF

## 2023-02-24 LAB — BACTERIA SPEC AEROBE CULT: ABNORMAL

## 2023-07-21 ENCOUNTER — LAB (OUTPATIENT)
Dept: LAB | Facility: HOSPITAL | Age: 88
End: 2023-07-21
Payer: MEDICARE

## 2023-07-21 DIAGNOSIS — E11.22 TYPE II DIABETES MELLITUS WITH END-STAGE RENAL DISEASE: ICD-10-CM

## 2023-07-21 DIAGNOSIS — N18.6 TYPE II DIABETES MELLITUS WITH END-STAGE RENAL DISEASE: ICD-10-CM

## 2023-07-21 DIAGNOSIS — I10 ESSENTIAL (PRIMARY) HYPERTENSION: ICD-10-CM

## 2023-07-21 LAB
ANION GAP SERPL CALCULATED.3IONS-SCNC: 13.2 MMOL/L (ref 5–15)
BUN SERPL-MCNC: 25 MG/DL (ref 8–23)
BUN/CREAT SERPL: 20.8 (ref 7–25)
CALCIUM SPEC-SCNC: 9.8 MG/DL (ref 8.6–10.5)
CHLORIDE SERPL-SCNC: 105 MMOL/L (ref 98–107)
CO2 SERPL-SCNC: 26.8 MMOL/L (ref 22–29)
CREAT SERPL-MCNC: 1.2 MG/DL (ref 0.57–1)
EGFRCR SERPLBLD CKD-EPI 2021: 43.9 ML/MIN/1.73
GLUCOSE SERPL-MCNC: 150 MG/DL (ref 65–99)
POTASSIUM SERPL-SCNC: 4.4 MMOL/L (ref 3.5–5.2)
SODIUM SERPL-SCNC: 145 MMOL/L (ref 136–145)

## 2023-07-21 PROCEDURE — 80048 BASIC METABOLIC PNL TOTAL CA: CPT

## 2023-07-21 PROCEDURE — 36415 COLL VENOUS BLD VENIPUNCTURE: CPT

## 2023-11-20 ENCOUNTER — LAB REQUISITION (OUTPATIENT)
Dept: LAB | Facility: HOSPITAL | Age: 88
End: 2023-11-20
Payer: MEDICARE

## 2023-11-20 DIAGNOSIS — N39.0 URINARY TRACT INFECTION, SITE NOT SPECIFIED: ICD-10-CM

## 2023-11-20 DIAGNOSIS — N18.9 CHRONIC KIDNEY DISEASE, UNSPECIFIED: ICD-10-CM

## 2023-11-20 DIAGNOSIS — A41.9 SEPSIS, UNSPECIFIED ORGANISM: ICD-10-CM

## 2023-11-20 LAB
BACTERIA UR QL AUTO: ABNORMAL /HPF
BILIRUB UR QL STRIP: NEGATIVE
CLARITY UR: CLEAR
COLOR UR: YELLOW
GLUCOSE UR STRIP-MCNC: NEGATIVE MG/DL
HGB UR QL STRIP.AUTO: ABNORMAL
HYALINE CASTS UR QL AUTO: ABNORMAL /LPF
KETONES UR QL STRIP: NEGATIVE
LEUKOCYTE ESTERASE UR QL STRIP.AUTO: ABNORMAL
NITRITE UR QL STRIP: NEGATIVE
PH UR STRIP.AUTO: <=5 [PH] (ref 5–8)
PROT UR QL STRIP: ABNORMAL
RBC # UR STRIP: ABNORMAL /HPF
REF LAB TEST METHOD: ABNORMAL
SP GR UR STRIP: 1.02 (ref 1–1.03)
SQUAMOUS #/AREA URNS HPF: ABNORMAL /HPF
UROBILINOGEN UR QL STRIP: ABNORMAL
WBC # UR STRIP: ABNORMAL /HPF
YEAST URNS QL MICRO: ABNORMAL /HPF

## 2023-11-20 PROCEDURE — 87186 SC STD MICRODIL/AGAR DIL: CPT | Performed by: INTERNAL MEDICINE

## 2023-11-20 PROCEDURE — 87086 URINE CULTURE/COLONY COUNT: CPT | Performed by: INTERNAL MEDICINE

## 2023-11-20 PROCEDURE — 81001 URINALYSIS AUTO W/SCOPE: CPT | Performed by: INTERNAL MEDICINE

## 2023-11-20 PROCEDURE — 87077 CULTURE AEROBIC IDENTIFY: CPT | Performed by: INTERNAL MEDICINE

## 2023-11-22 LAB
BACTERIA SPEC AEROBE CULT: ABNORMAL
BACTERIA SPEC AEROBE CULT: ABNORMAL

## 2023-12-08 ENCOUNTER — LAB REQUISITION (OUTPATIENT)
Dept: LAB | Facility: HOSPITAL | Age: 88
End: 2023-12-08
Payer: MEDICARE

## 2023-12-08 DIAGNOSIS — N18.9 CHRONIC KIDNEY DISEASE, UNSPECIFIED: ICD-10-CM

## 2023-12-08 LAB — SARS-COV-2 RNA RESP QL NAA+PROBE: NOT DETECTED

## 2023-12-08 PROCEDURE — 87635 SARS-COV-2 COVID-19 AMP PRB: CPT | Performed by: INTERNAL MEDICINE

## 2024-01-01 ENCOUNTER — APPOINTMENT (OUTPATIENT)
Dept: CT IMAGING | Facility: HOSPITAL | Age: 89
DRG: 871 | End: 2024-01-01
Payer: MEDICARE

## 2024-01-01 ENCOUNTER — APPOINTMENT (OUTPATIENT)
Dept: GENERAL RADIOLOGY | Facility: HOSPITAL | Age: 89
DRG: 871 | End: 2024-01-01
Payer: MEDICARE

## 2024-01-01 ENCOUNTER — LAB REQUISITION (OUTPATIENT)
Dept: LAB | Facility: HOSPITAL | Age: 89
End: 2024-01-01
Payer: MEDICARE

## 2024-01-01 ENCOUNTER — LAB REQUISITION (OUTPATIENT)
Dept: LAB | Facility: HOSPITAL | Age: 89
DRG: 871 | End: 2024-01-01
Payer: MEDICARE

## 2024-01-01 ENCOUNTER — HOSPITAL ENCOUNTER (INPATIENT)
Facility: HOSPITAL | Age: 89
LOS: 3 days | DRG: 871 | End: 2024-12-30
Attending: EMERGENCY MEDICINE | Admitting: STUDENT IN AN ORGANIZED HEALTH CARE EDUCATION/TRAINING PROGRAM
Payer: MEDICARE

## 2024-01-01 VITALS
DIASTOLIC BLOOD PRESSURE: 40 MMHG | RESPIRATION RATE: 16 BRPM | SYSTOLIC BLOOD PRESSURE: 85 MMHG | HEIGHT: 70 IN | WEIGHT: 154.54 LBS | OXYGEN SATURATION: 99 % | HEART RATE: 96 BPM | TEMPERATURE: 98.8 F | BODY MASS INDEX: 22.12 KG/M2

## 2024-01-01 DIAGNOSIS — Z79.2 LONG TERM (CURRENT) USE OF ANTIBIOTICS: ICD-10-CM

## 2024-01-01 DIAGNOSIS — B96.29 OTHER ESCHERICHIA COLI (E. COLI) AS THE CAUSE OF DISEASES CLASSIFIED ELSEWHERE: ICD-10-CM

## 2024-01-01 DIAGNOSIS — L08.9 LOCAL INFECTION OF THE SKIN AND SUBCUTANEOUS TISSUE, UNSPECIFIED: ICD-10-CM

## 2024-01-01 DIAGNOSIS — A41.9 SEPSIS, UNSPECIFIED ORGANISM: ICD-10-CM

## 2024-01-01 DIAGNOSIS — A41.9 SEPSIS WITH ENCEPHALOPATHY AND SEPTIC SHOCK, DUE TO UNSPECIFIED ORGANISM: Primary | ICD-10-CM

## 2024-01-01 DIAGNOSIS — G93.41 SEPSIS WITH ENCEPHALOPATHY AND SEPTIC SHOCK, DUE TO UNSPECIFIED ORGANISM: Primary | ICD-10-CM

## 2024-01-01 DIAGNOSIS — R65.21 SEPSIS WITH ENCEPHALOPATHY AND SEPTIC SHOCK, DUE TO UNSPECIFIED ORGANISM: Primary | ICD-10-CM

## 2024-01-01 LAB
A-A DO2: 17.4 MMHG (ref 0–300)
ALBUMIN SERPL-MCNC: 2.8 G/DL (ref 3.5–5.2)
ALBUMIN SERPL-MCNC: 3 G/DL (ref 3.5–5.2)
ALBUMIN/GLOB SERPL: 1 G/DL
ALBUMIN/GLOB SERPL: 1 G/DL
ALP SERPL-CCNC: 118 U/L (ref 39–117)
ALP SERPL-CCNC: 118 U/L (ref 39–117)
ALT SERPL W P-5'-P-CCNC: 18 U/L (ref 1–33)
ALT SERPL W P-5'-P-CCNC: 18 U/L (ref 1–33)
ANION GAP SERPL CALCULATED.3IONS-SCNC: 13.6 MMOL/L (ref 5–15)
ANION GAP SERPL CALCULATED.3IONS-SCNC: 14.6 MMOL/L (ref 5–15)
APTT PPP: 52.2 SECONDS (ref 26.5–34.5)
ARTERIAL PATENCY WRIST A: ABNORMAL
AST SERPL-CCNC: 33 U/L (ref 1–32)
AST SERPL-CCNC: 35 U/L (ref 1–32)
ATMOSPHERIC PRESS: 728 MMHG
BACTERIA BLD CULT: ABNORMAL
BACTERIA SPEC AEROBE CULT: ABNORMAL
BACTERIA UR QL AUTO: NORMAL /HPF
BASE EXCESS BLDA CALC-SCNC: 3.2 MMOL/L (ref 0–2)
BASOPHILS # BLD AUTO: 0.01 10*3/MM3 (ref 0–0.2)
BASOPHILS # BLD AUTO: 0.01 10*3/MM3 (ref 0–0.2)
BASOPHILS NFR BLD AUTO: 0.1 % (ref 0–1.5)
BASOPHILS NFR BLD AUTO: 0.2 % (ref 0–1.5)
BDY SITE: ABNORMAL
BILIRUB SERPL-MCNC: 0.2 MG/DL (ref 0–1.2)
BILIRUB SERPL-MCNC: <0.2 MG/DL (ref 0–1.2)
BILIRUB UR QL STRIP: NEGATIVE
BOTTLE TYPE: ABNORMAL
BUN SERPL-MCNC: 49 MG/DL (ref 8–23)
BUN SERPL-MCNC: 51 MG/DL (ref 8–23)
BUN/CREAT SERPL: 37.1 (ref 7–25)
BUN/CREAT SERPL: 43.2 (ref 7–25)
CALCIUM SPEC-SCNC: 7.4 MG/DL (ref 8.6–10.5)
CALCIUM SPEC-SCNC: 8.1 MG/DL (ref 8.6–10.5)
CHLORIDE SERPL-SCNC: 112 MMOL/L (ref 98–107)
CHLORIDE SERPL-SCNC: 115 MMOL/L (ref 98–107)
CK SERPL-CCNC: 98 U/L (ref 20–180)
CLARITY UR: CLEAR
CO2 BLDA-SCNC: 30.4 MMOL/L (ref 22–33)
CO2 SERPL-SCNC: 23.4 MMOL/L (ref 22–29)
CO2 SERPL-SCNC: 27.4 MMOL/L (ref 22–29)
COHGB MFR BLD: 1.4 % (ref 0–5)
COLOR UR: ABNORMAL
CREAT SERPL-MCNC: 1.18 MG/DL (ref 0.57–1)
CREAT SERPL-MCNC: 1.32 MG/DL (ref 0.57–1)
CRP SERPL-MCNC: 3.95 MG/DL (ref 0–0.5)
D-LACTATE SERPL-SCNC: 1 MMOL/L (ref 0.5–2)
D-LACTATE SERPL-SCNC: 1.4 MMOL/L (ref 0.5–2)
DEPRECATED RDW RBC AUTO: 59.7 FL (ref 37–54)
DEPRECATED RDW RBC AUTO: 60.6 FL (ref 37–54)
EGFRCR SERPLBLD CKD-EPI 2021: 38.9 ML/MIN/1.73
EGFRCR SERPLBLD CKD-EPI 2021: 44.5 ML/MIN/1.73
EOSINOPHIL # BLD AUTO: 0.04 10*3/MM3 (ref 0–0.4)
EOSINOPHIL # BLD AUTO: 0.18 10*3/MM3 (ref 0–0.4)
EOSINOPHIL NFR BLD AUTO: 0.4 % (ref 0.3–6.2)
EOSINOPHIL NFR BLD AUTO: 2.9 % (ref 0.3–6.2)
ERYTHROCYTE [DISTWIDTH] IN BLOOD BY AUTOMATED COUNT: 19 % (ref 12.3–15.4)
ERYTHROCYTE [DISTWIDTH] IN BLOOD BY AUTOMATED COUNT: 19.1 % (ref 12.3–15.4)
ERYTHROCYTE [SEDIMENTATION RATE] IN BLOOD: 50 MM/HR (ref 0–30)
GEN 5 1HR TROPONIN T REFLEX: 47 NG/L
GLOBULIN UR ELPH-MCNC: 2.8 GM/DL
GLOBULIN UR ELPH-MCNC: 2.9 GM/DL
GLUCOSE SERPL-MCNC: 101 MG/DL (ref 65–99)
GLUCOSE SERPL-MCNC: 85 MG/DL (ref 65–99)
GLUCOSE UR STRIP-MCNC: NEGATIVE MG/DL
GRAM STN SPEC: ABNORMAL
HCO3 BLDA-SCNC: 28.9 MMOL/L (ref 20–26)
HCT VFR BLD AUTO: 29.9 % (ref 34–46.6)
HCT VFR BLD AUTO: 31.3 % (ref 34–46.6)
HCT VFR BLD CALC: 29.3 % (ref 38–51)
HGB BLD-MCNC: 9.3 G/DL (ref 12–15.9)
HGB BLD-MCNC: 9.5 G/DL (ref 12–15.9)
HGB BLDA-MCNC: 9.6 G/DL (ref 13.5–17.5)
HGB UR QL STRIP.AUTO: NEGATIVE
HYALINE CASTS UR QL AUTO: NORMAL /LPF
IMM GRANULOCYTES # BLD AUTO: 0.05 10*3/MM3 (ref 0–0.05)
IMM GRANULOCYTES # BLD AUTO: 0.08 10*3/MM3 (ref 0–0.05)
IMM GRANULOCYTES NFR BLD AUTO: 0.8 % (ref 0–0.5)
IMM GRANULOCYTES NFR BLD AUTO: 0.9 % (ref 0–0.5)
INHALED O2 CONCENTRATION: 21 %
INR PPP: 1.79 (ref 0.9–1.1)
INR PPP: 1.85 (ref 0.9–1.1)
ISOLATED FROM: ABNORMAL
KETONES UR QL STRIP: ABNORMAL
LEUKOCYTE ESTERASE UR QL STRIP.AUTO: NEGATIVE
LYMPHOCYTES # BLD AUTO: 1.34 10*3/MM3 (ref 0.7–3.1)
LYMPHOCYTES # BLD AUTO: 1.36 10*3/MM3 (ref 0.7–3.1)
LYMPHOCYTES NFR BLD AUTO: 15.1 % (ref 19.6–45.3)
LYMPHOCYTES NFR BLD AUTO: 21.4 % (ref 19.6–45.3)
Lab: ABNORMAL
MAGNESIUM SERPL-MCNC: 1.1 MG/DL (ref 1.6–2.4)
MCH RBC QN AUTO: 26.8 PG (ref 26.6–33)
MCH RBC QN AUTO: 27.4 PG (ref 26.6–33)
MCHC RBC AUTO-ENTMCNC: 30.4 G/DL (ref 31.5–35.7)
MCHC RBC AUTO-ENTMCNC: 31.1 G/DL (ref 31.5–35.7)
MCV RBC AUTO: 87.9 FL (ref 79–97)
MCV RBC AUTO: 88.4 FL (ref 79–97)
METHGB BLD QL: 0.4 % (ref 0–3)
MODALITY: ABNORMAL
MONOCYTES # BLD AUTO: 0.43 10*3/MM3 (ref 0.1–0.9)
MONOCYTES # BLD AUTO: 1.04 10*3/MM3 (ref 0.1–0.9)
MONOCYTES NFR BLD AUTO: 11.6 % (ref 5–12)
MONOCYTES NFR BLD AUTO: 6.9 % (ref 5–12)
NEUTROPHILS NFR BLD AUTO: 4.26 10*3/MM3 (ref 1.7–7)
NEUTROPHILS NFR BLD AUTO: 6.46 10*3/MM3 (ref 1.7–7)
NEUTROPHILS NFR BLD AUTO: 67.8 % (ref 42.7–76)
NEUTROPHILS NFR BLD AUTO: 71.9 % (ref 42.7–76)
NITRITE UR QL STRIP: NEGATIVE
NRBC BLD AUTO-RTO: 0 /100 WBC (ref 0–0.2)
NRBC BLD AUTO-RTO: 0.6 /100 WBC (ref 0–0.2)
OXYHGB MFR BLDV: 91.9 % (ref 94–99)
PCO2 BLDA: 48.8 MM HG (ref 35–45)
PCO2 TEMP ADJ BLD: ABNORMAL MM[HG]
PH BLDA: 7.38 PH UNITS (ref 7.35–7.45)
PH UR STRIP.AUTO: <=5 [PH] (ref 5–8)
PH, TEMP CORRECTED: ABNORMAL
PLATELET # BLD AUTO: 281 10*3/MM3 (ref 140–450)
PLATELET # BLD AUTO: 311 10*3/MM3 (ref 140–450)
PMV BLD AUTO: 11.5 FL (ref 6–12)
PMV BLD AUTO: 11.7 FL (ref 6–12)
PO2 BLDA: 70.5 MM HG (ref 83–108)
PO2 TEMP ADJ BLD: ABNORMAL MM[HG]
POTASSIUM SERPL-SCNC: 2.7 MMOL/L (ref 3.5–5.2)
POTASSIUM SERPL-SCNC: 3.8 MMOL/L (ref 3.5–5.2)
PROCALCITONIN SERPL-MCNC: 0.08 NG/ML (ref 0–0.25)
PROT SERPL-MCNC: 5.6 G/DL (ref 6–8.5)
PROT SERPL-MCNC: 5.9 G/DL (ref 6–8.5)
PROT UR QL STRIP: ABNORMAL
PROTHROMBIN TIME: 20.9 SECONDS (ref 12.1–14.7)
PROTHROMBIN TIME: 21.5 SECONDS (ref 12.1–14.7)
QT INTERVAL: 424 MS
QT INTERVAL: 562 MS
QTC INTERVAL: 470 MS
QTC INTERVAL: 527 MS
RBC # BLD AUTO: 3.4 10*6/MM3 (ref 3.77–5.28)
RBC # BLD AUTO: 3.54 10*6/MM3 (ref 3.77–5.28)
RBC # UR STRIP: NORMAL /HPF
REF LAB TEST METHOD: NORMAL
SAO2 % BLDCOA: 93.6 % (ref 94–99)
SARS-COV-2 RNA RESP QL NAA+PROBE: NOT DETECTED
SODIUM SERPL-SCNC: 152 MMOL/L (ref 136–145)
SODIUM SERPL-SCNC: 154 MMOL/L (ref 136–145)
SP GR UR STRIP: 1.02 (ref 1–1.03)
SQUAMOUS #/AREA URNS HPF: NORMAL /HPF
TROPONIN T % DELTA: -13 %
TROPONIN T NUMERIC DELTA: -7 NG/L
TROPONIN T SERPL HS-MCNC: 54 NG/L
TSH SERPL DL<=0.05 MIU/L-ACNC: 2.83 UIU/ML (ref 0.27–4.2)
UFH PPP CHRO-ACNC: >1.1 IU/ML (ref 0.3–0.7)
UROBILINOGEN UR QL STRIP: ABNORMAL
VANCOMYCIN TROUGH SERPL-MCNC: 15.7 MCG/ML (ref 5–20)
VANCOMYCIN TROUGH SERPL-MCNC: 16.4 MCG/ML (ref 5–20)
VENTILATOR MODE: ABNORMAL
WBC # UR STRIP: NORMAL /HPF
WBC NRBC COR # BLD AUTO: 6.27 10*3/MM3 (ref 3.4–10.8)
WBC NRBC COR # BLD AUTO: 8.99 10*3/MM3 (ref 3.4–10.8)

## 2024-01-01 PROCEDURE — 85610 PROTHROMBIN TIME: CPT | Performed by: EMERGENCY MEDICINE

## 2024-01-01 PROCEDURE — 25010000002 POTASSIUM CHLORIDE 10 MEQ/100ML SOLUTION: Performed by: EMERGENCY MEDICINE

## 2024-01-01 PROCEDURE — 85652 RBC SED RATE AUTOMATED: CPT | Performed by: STUDENT IN AN ORGANIZED HEALTH CARE EDUCATION/TRAINING PROGRAM

## 2024-01-01 PROCEDURE — 70450 CT HEAD/BRAIN W/O DYE: CPT

## 2024-01-01 PROCEDURE — 25010000002 MORPHINE PER 10 MG: Performed by: INTERNAL MEDICINE

## 2024-01-01 PROCEDURE — 82375 ASSAY CARBOXYHB QUANT: CPT

## 2024-01-01 PROCEDURE — 82550 ASSAY OF CK (CPK): CPT | Performed by: STUDENT IN AN ORGANIZED HEALTH CARE EDUCATION/TRAINING PROGRAM

## 2024-01-01 PROCEDURE — P9612 CATHETERIZE FOR URINE SPEC: HCPCS

## 2024-01-01 PROCEDURE — 85520 HEPARIN ASSAY: CPT | Performed by: EMERGENCY MEDICINE

## 2024-01-01 PROCEDURE — 93005 ELECTROCARDIOGRAM TRACING: CPT | Performed by: STUDENT IN AN ORGANIZED HEALTH CARE EDUCATION/TRAINING PROGRAM

## 2024-01-01 PROCEDURE — 36415 COLL VENOUS BLD VENIPUNCTURE: CPT

## 2024-01-01 PROCEDURE — 84484 ASSAY OF TROPONIN QUANT: CPT | Performed by: EMERGENCY MEDICINE

## 2024-01-01 PROCEDURE — 83050 HGB METHEMOGLOBIN QUAN: CPT

## 2024-01-01 PROCEDURE — 25810000003 SODIUM CHLORIDE 0.9 % SOLUTION: Performed by: EMERGENCY MEDICINE

## 2024-01-01 PROCEDURE — 87481 CANDIDA DNA AMP PROBE: CPT | Performed by: STUDENT IN AN ORGANIZED HEALTH CARE EDUCATION/TRAINING PROGRAM

## 2024-01-01 PROCEDURE — 83605 ASSAY OF LACTIC ACID: CPT | Performed by: STUDENT IN AN ORGANIZED HEALTH CARE EDUCATION/TRAINING PROGRAM

## 2024-01-01 PROCEDURE — 99232 SBSQ HOSP IP/OBS MODERATE 35: CPT | Performed by: STUDENT IN AN ORGANIZED HEALTH CARE EDUCATION/TRAINING PROGRAM

## 2024-01-01 PROCEDURE — 87147 CULTURE TYPE IMMUNOLOGIC: CPT | Performed by: EMERGENCY MEDICINE

## 2024-01-01 PROCEDURE — 85025 COMPLETE CBC W/AUTO DIFF WBC: CPT | Performed by: STUDENT IN AN ORGANIZED HEALTH CARE EDUCATION/TRAINING PROGRAM

## 2024-01-01 PROCEDURE — 36600 WITHDRAWAL OF ARTERIAL BLOOD: CPT

## 2024-01-01 PROCEDURE — 83735 ASSAY OF MAGNESIUM: CPT | Performed by: EMERGENCY MEDICINE

## 2024-01-01 PROCEDURE — 70450 CT HEAD/BRAIN W/O DYE: CPT | Performed by: RADIOLOGY

## 2024-01-01 PROCEDURE — 87040 BLOOD CULTURE FOR BACTERIA: CPT | Performed by: EMERGENCY MEDICINE

## 2024-01-01 PROCEDURE — 25010000002 LORAZEPAM PER 2 MG: Performed by: STUDENT IN AN ORGANIZED HEALTH CARE EDUCATION/TRAINING PROGRAM

## 2024-01-01 PROCEDURE — 86140 C-REACTIVE PROTEIN: CPT | Performed by: STUDENT IN AN ORGANIZED HEALTH CARE EDUCATION/TRAINING PROGRAM

## 2024-01-01 PROCEDURE — 80202 ASSAY OF VANCOMYCIN: CPT | Performed by: INTERNAL MEDICINE

## 2024-01-01 PROCEDURE — 93005 ELECTROCARDIOGRAM TRACING: CPT | Performed by: EMERGENCY MEDICINE

## 2024-01-01 PROCEDURE — 74176 CT ABD & PELVIS W/O CONTRAST: CPT | Performed by: RADIOLOGY

## 2024-01-01 PROCEDURE — 87635 SARS-COV-2 COVID-19 AMP PRB: CPT | Performed by: EMERGENCY MEDICINE

## 2024-01-01 PROCEDURE — 25010000002 MAGNESIUM SULFATE 4 GM/100ML SOLUTION: Performed by: STUDENT IN AN ORGANIZED HEALTH CARE EDUCATION/TRAINING PROGRAM

## 2024-01-01 PROCEDURE — 81001 URINALYSIS AUTO W/SCOPE: CPT | Performed by: EMERGENCY MEDICINE

## 2024-01-01 PROCEDURE — 80053 COMPREHEN METABOLIC PANEL: CPT | Performed by: EMERGENCY MEDICINE

## 2024-01-01 PROCEDURE — 99231 SBSQ HOSP IP/OBS SF/LOW 25: CPT | Performed by: STUDENT IN AN ORGANIZED HEALTH CARE EDUCATION/TRAINING PROGRAM

## 2024-01-01 PROCEDURE — 83605 ASSAY OF LACTIC ACID: CPT | Performed by: EMERGENCY MEDICINE

## 2024-01-01 PROCEDURE — 85025 COMPLETE CBC W/AUTO DIFF WBC: CPT | Performed by: EMERGENCY MEDICINE

## 2024-01-01 PROCEDURE — 25010000002 ERTAPENEM PER 500 MG: Performed by: STUDENT IN AN ORGANIZED HEALTH CARE EDUCATION/TRAINING PROGRAM

## 2024-01-01 PROCEDURE — 84145 PROCALCITONIN (PCT): CPT | Performed by: STUDENT IN AN ORGANIZED HEALTH CARE EDUCATION/TRAINING PROGRAM

## 2024-01-01 PROCEDURE — 71250 CT THORAX DX C-: CPT

## 2024-01-01 PROCEDURE — 87154 CUL TYP ID BLD PTHGN 6+ TRGT: CPT | Performed by: EMERGENCY MEDICINE

## 2024-01-01 PROCEDURE — 74176 CT ABD & PELVIS W/O CONTRAST: CPT

## 2024-01-01 PROCEDURE — 85730 THROMBOPLASTIN TIME PARTIAL: CPT | Performed by: EMERGENCY MEDICINE

## 2024-01-01 PROCEDURE — 25810000003 SEPSIS FLUID NS 0.9 % SOLUTION: Performed by: EMERGENCY MEDICINE

## 2024-01-01 PROCEDURE — 99285 EMERGENCY DEPT VISIT HI MDM: CPT

## 2024-01-01 PROCEDURE — 71250 CT THORAX DX C-: CPT | Performed by: RADIOLOGY

## 2024-01-01 PROCEDURE — 71045 X-RAY EXAM CHEST 1 VIEW: CPT

## 2024-01-01 PROCEDURE — 71045 X-RAY EXAM CHEST 1 VIEW: CPT | Performed by: RADIOLOGY

## 2024-01-01 PROCEDURE — 99291 CRITICAL CARE FIRST HOUR: CPT | Performed by: STUDENT IN AN ORGANIZED HEALTH CARE EDUCATION/TRAINING PROGRAM

## 2024-01-01 PROCEDURE — 82805 BLOOD GASES W/O2 SATURATION: CPT

## 2024-01-01 PROCEDURE — 84443 ASSAY THYROID STIM HORMONE: CPT | Performed by: EMERGENCY MEDICINE

## 2024-01-01 PROCEDURE — 25010000002 AZTREONAM PER 500 MG: Performed by: EMERGENCY MEDICINE

## 2024-01-01 PROCEDURE — 25010000002 VANCOMYCIN 5 G RECONSTITUTED SOLUTION: Performed by: EMERGENCY MEDICINE

## 2024-01-01 PROCEDURE — 80202 ASSAY OF VANCOMYCIN: CPT | Performed by: NURSE PRACTITIONER

## 2024-01-01 PROCEDURE — 93010 ELECTROCARDIOGRAM REPORT: CPT | Performed by: INTERNAL MEDICINE

## 2024-01-01 PROCEDURE — 80053 COMPREHEN METABOLIC PANEL: CPT | Performed by: STUDENT IN AN ORGANIZED HEALTH CARE EDUCATION/TRAINING PROGRAM

## 2024-01-01 PROCEDURE — 85610 PROTHROMBIN TIME: CPT | Performed by: STUDENT IN AN ORGANIZED HEALTH CARE EDUCATION/TRAINING PROGRAM

## 2024-01-01 PROCEDURE — 25010000002 MORPHINE PER 10 MG: Performed by: STUDENT IN AN ORGANIZED HEALTH CARE EDUCATION/TRAINING PROGRAM

## 2024-01-01 RX ORDER — SODIUM CHLORIDE 0.9 % (FLUSH) 0.9 %
10 SYRINGE (ML) INJECTION AS NEEDED
Status: DISCONTINUED | OUTPATIENT
Start: 2024-01-01 | End: 2024-01-01

## 2024-01-01 RX ORDER — LACTULOSE 10 G/15ML
10 SOLUTION ORAL 2 TIMES DAILY
COMMUNITY

## 2024-01-01 RX ORDER — MULTIPLE VITAMINS W/ MINERALS TAB 9MG-400MCG
1 TAB ORAL DAILY
Status: DISCONTINUED | OUTPATIENT
Start: 2024-01-01 | End: 2024-01-01

## 2024-01-01 RX ORDER — ONDANSETRON 4 MG/1
4 TABLET, ORALLY DISINTEGRATING ORAL EVERY 8 HOURS PRN
Status: DISCONTINUED | OUTPATIENT
Start: 2024-01-01 | End: 2024-01-01

## 2024-01-01 RX ORDER — SODIUM CHLORIDE 0.9 % (FLUSH) 0.9 %
10 SYRINGE (ML) INJECTION EVERY 12 HOURS SCHEDULED
Status: DISCONTINUED | OUTPATIENT
Start: 2024-01-01 | End: 2024-01-01

## 2024-01-01 RX ORDER — MULTIPLE VITAMINS W/ MINERALS TAB 9MG-400MCG
1 TAB ORAL DAILY
COMMUNITY

## 2024-01-01 RX ORDER — POLYETHYLENE GLYCOL 3350 17 G/17G
17 POWDER, FOR SOLUTION ORAL DAILY
COMMUNITY

## 2024-01-01 RX ORDER — FAMOTIDINE 20 MG/1
20 TABLET, FILM COATED ORAL DAILY
Status: DISCONTINUED | OUTPATIENT
Start: 2024-01-01 | End: 2024-01-01

## 2024-01-01 RX ORDER — POTASSIUM CHLORIDE 7.45 MG/ML
10 INJECTION INTRAVENOUS
Status: COMPLETED | OUTPATIENT
Start: 2024-01-01 | End: 2024-01-01

## 2024-01-01 RX ORDER — LACTULOSE 10 G/15ML
10 SOLUTION ORAL 2 TIMES DAILY
Status: DISCONTINUED | OUTPATIENT
Start: 2024-01-01 | End: 2024-01-01

## 2024-01-01 RX ORDER — FAMOTIDINE 20 MG/1
20 TABLET, FILM COATED ORAL DAILY
COMMUNITY

## 2024-01-01 RX ORDER — MORPHINE SULFATE 2 MG/ML
2 INJECTION, SOLUTION INTRAMUSCULAR; INTRAVENOUS ONCE
Status: COMPLETED | OUTPATIENT
Start: 2024-01-01 | End: 2024-01-01

## 2024-01-01 RX ORDER — LORAZEPAM 2 MG/ML
1 INJECTION INTRAMUSCULAR
Status: DISCONTINUED | OUTPATIENT
Start: 2024-01-01 | End: 2024-01-01 | Stop reason: HOSPADM

## 2024-01-01 RX ORDER — DIPHENHYDRAMINE HYDROCHLORIDE AND LIDOCAINE HYDROCHLORIDE AND ALUMINUM HYDROXIDE AND MAGNESIUM HYDRO
10 KIT 2 TIMES DAILY PRN
Status: DISCONTINUED | OUTPATIENT
Start: 2024-01-01 | End: 2024-01-01

## 2024-01-01 RX ORDER — SODIUM CHLORIDE 0.9 % (FLUSH) 0.9 %
10 SYRINGE (ML) INJECTION EVERY 12 HOURS SCHEDULED
Status: DISCONTINUED | OUTPATIENT
Start: 2024-01-01 | End: 2024-01-01 | Stop reason: HOSPADM

## 2024-01-01 RX ORDER — BUSPIRONE HYDROCHLORIDE 5 MG/1
5 TABLET ORAL 2 TIMES DAILY
Status: DISCONTINUED | OUTPATIENT
Start: 2024-01-01 | End: 2024-01-01

## 2024-01-01 RX ORDER — AMLODIPINE BESYLATE 5 MG/1
5 TABLET ORAL DAILY
Status: CANCELLED | OUTPATIENT
Start: 2024-01-01

## 2024-01-01 RX ORDER — AMOXICILLIN 250 MG
2 CAPSULE ORAL 2 TIMES DAILY PRN
Status: DISCONTINUED | OUTPATIENT
Start: 2024-01-01 | End: 2024-01-01

## 2024-01-01 RX ORDER — HEPARIN SODIUM 5000 [USP'U]/ML
50 INJECTION, SOLUTION INTRAVENOUS; SUBCUTANEOUS AS NEEDED
Status: DISCONTINUED | OUTPATIENT
Start: 2024-01-01 | End: 2024-01-01

## 2024-01-01 RX ORDER — ASCORBIC ACID 500 MG
500 TABLET ORAL 2 TIMES DAILY
Status: DISCONTINUED | OUTPATIENT
Start: 2024-01-01 | End: 2024-01-01

## 2024-01-01 RX ORDER — NITROGLYCERIN 0.4 MG/1
0.4 TABLET SUBLINGUAL
Status: DISCONTINUED | OUTPATIENT
Start: 2024-01-01 | End: 2024-01-01

## 2024-01-01 RX ORDER — MIRTAZAPINE 15 MG/1
30 TABLET, FILM COATED ORAL NIGHTLY
Status: DISCONTINUED | OUTPATIENT
Start: 2024-01-01 | End: 2024-01-01

## 2024-01-01 RX ORDER — SENNOSIDES A AND B 8.6 MG/1
2 TABLET, FILM COATED ORAL 2 TIMES DAILY
Status: CANCELLED | OUTPATIENT
Start: 2024-01-01

## 2024-01-01 RX ORDER — SODIUM CHLORIDE 0.9 % (FLUSH) 0.9 %
10 SYRINGE (ML) INJECTION AS NEEDED
Status: DISCONTINUED | OUTPATIENT
Start: 2024-01-01 | End: 2024-01-01 | Stop reason: HOSPADM

## 2024-01-01 RX ORDER — SODIUM CHLORIDE 9 MG/ML
40 INJECTION, SOLUTION INTRAVENOUS AS NEEDED
Status: DISCONTINUED | OUTPATIENT
Start: 2024-01-01 | End: 2024-01-01

## 2024-01-01 RX ORDER — GLYCOPYRROLATE 0.2 MG/ML
0.2 INJECTION INTRAMUSCULAR; INTRAVENOUS EVERY 4 HOURS PRN
Status: DISCONTINUED | OUTPATIENT
Start: 2024-01-01 | End: 2024-01-01 | Stop reason: HOSPADM

## 2024-01-01 RX ORDER — ONDANSETRON 2 MG/ML
4 INJECTION INTRAMUSCULAR; INTRAVENOUS EVERY 6 HOURS PRN
Status: DISCONTINUED | OUTPATIENT
Start: 2024-01-01 | End: 2024-01-01

## 2024-01-01 RX ORDER — CYPROHEPTADINE HYDROCHLORIDE 4 MG/1
4 TABLET ORAL 3 TIMES DAILY
COMMUNITY

## 2024-01-01 RX ORDER — BISACODYL 5 MG/1
5 TABLET, DELAYED RELEASE ORAL DAILY PRN
Status: DISCONTINUED | OUTPATIENT
Start: 2024-01-01 | End: 2024-01-01

## 2024-01-01 RX ORDER — ERYTHROMYCIN 5 MG/G
1 OINTMENT OPHTHALMIC NIGHTLY
Status: DISCONTINUED | OUTPATIENT
Start: 2024-01-01 | End: 2024-01-01

## 2024-01-01 RX ORDER — HEPARIN SODIUM 5000 [USP'U]/ML
4000 INJECTION, SOLUTION INTRAVENOUS; SUBCUTANEOUS ONCE
Status: DISCONTINUED | OUTPATIENT
Start: 2024-01-01 | End: 2024-01-01

## 2024-01-01 RX ORDER — BISACODYL 10 MG
10 SUPPOSITORY, RECTAL RECTAL DAILY PRN
Status: DISCONTINUED | OUTPATIENT
Start: 2024-01-01 | End: 2024-01-01

## 2024-01-01 RX ORDER — BUSPIRONE HYDROCHLORIDE 5 MG/1
5 TABLET ORAL 2 TIMES DAILY
COMMUNITY

## 2024-01-01 RX ORDER — SCOLOPAMINE TRANSDERMAL SYSTEM 1 MG/1
1 PATCH, EXTENDED RELEASE TRANSDERMAL
Status: DISCONTINUED | OUTPATIENT
Start: 2024-01-01 | End: 2024-01-01 | Stop reason: HOSPADM

## 2024-01-01 RX ORDER — NEOMYCIN SULFATE, POLYMYXIN B SULFATE, AND DEXAMETHASONE 3.5; 10000; 1 MG/G; [USP'U]/G; MG/G
0.25 OINTMENT OPHTHALMIC NIGHTLY
COMMUNITY

## 2024-01-01 RX ORDER — ASPIRIN 300 MG/1
300 SUPPOSITORY RECTAL ONCE
Status: COMPLETED | OUTPATIENT
Start: 2024-01-01 | End: 2024-01-01

## 2024-01-01 RX ORDER — MORPHINE SULFATE 2 MG/ML
2 INJECTION, SOLUTION INTRAMUSCULAR; INTRAVENOUS
Status: DISCONTINUED | OUTPATIENT
Start: 2024-01-01 | End: 2024-01-01

## 2024-01-01 RX ORDER — HEPARIN SODIUM 10000 [USP'U]/100ML
12 INJECTION, SOLUTION INTRAVENOUS
Status: DISCONTINUED | OUTPATIENT
Start: 2024-01-01 | End: 2024-01-01

## 2024-01-01 RX ORDER — ZINC SULFATE 50(220)MG
220 CAPSULE ORAL 2 TIMES DAILY
Status: DISCONTINUED | OUTPATIENT
Start: 2024-01-01 | End: 2024-01-01

## 2024-01-01 RX ORDER — HEPARIN SODIUM 5000 [USP'U]/ML
5000 INJECTION, SOLUTION INTRAVENOUS; SUBCUTANEOUS EVERY 8 HOURS SCHEDULED
Status: CANCELLED | OUTPATIENT
Start: 2024-01-01

## 2024-01-01 RX ORDER — NOREPINEPHRINE BITARTRATE 0.03 MG/ML
.02-.3 INJECTION, SOLUTION INTRAVENOUS
Status: DISCONTINUED | OUTPATIENT
Start: 2024-01-01 | End: 2024-01-01

## 2024-01-01 RX ORDER — HEPARIN SODIUM 5000 [USP'U]/ML
25 INJECTION, SOLUTION INTRAVENOUS; SUBCUTANEOUS AS NEEDED
Status: DISCONTINUED | OUTPATIENT
Start: 2024-01-01 | End: 2024-01-01

## 2024-01-01 RX ORDER — HYDROCODONE BITARTRATE AND ACETAMINOPHEN 5; 325 MG/1; MG/1
1 TABLET ORAL EVERY 12 HOURS PRN
Status: DISCONTINUED | OUTPATIENT
Start: 2024-01-01 | End: 2024-01-01

## 2024-01-01 RX ORDER — SODIUM CHLORIDE 0.9 % (FLUSH) 0.9 %
20 SYRINGE (ML) INJECTION AS NEEDED
Status: DISCONTINUED | OUTPATIENT
Start: 2024-01-01 | End: 2024-01-01 | Stop reason: HOSPADM

## 2024-01-01 RX ORDER — MAGNESIUM SULFATE HEPTAHYDRATE 40 MG/ML
4 INJECTION, SOLUTION INTRAVENOUS ONCE
Status: COMPLETED | OUTPATIENT
Start: 2024-01-01 | End: 2024-01-01

## 2024-01-01 RX ORDER — CYPROHEPTADINE HYDROCHLORIDE 4 MG/1
4 TABLET ORAL 3 TIMES DAILY
Status: DISCONTINUED | OUTPATIENT
Start: 2024-01-01 | End: 2024-01-01

## 2024-01-01 RX ORDER — VANCOMYCIN/0.9 % SOD CHLORIDE 1 G/100 ML
1000 PLASTIC BAG, INJECTION (ML) INTRAVENOUS EVERY 24 HOURS
Status: DISCONTINUED | OUTPATIENT
Start: 2024-01-01 | End: 2024-01-01

## 2024-01-01 RX ORDER — MORPHINE SULFATE 2 MG/ML
2 INJECTION, SOLUTION INTRAMUSCULAR; INTRAVENOUS
Status: DISCONTINUED | OUTPATIENT
Start: 2024-01-01 | End: 2024-01-01 | Stop reason: HOSPADM

## 2024-01-01 RX ORDER — POLYETHYLENE GLYCOL 3350 17 G/17G
17 POWDER, FOR SOLUTION ORAL DAILY
Status: CANCELLED | OUTPATIENT
Start: 2024-01-01

## 2024-01-01 RX ORDER — HYDROCODONE BITARTRATE AND ACETAMINOPHEN 5; 325 MG/1; MG/1
1 TABLET ORAL EVERY 12 HOURS PRN
COMMUNITY

## 2024-01-01 RX ORDER — POLYETHYLENE GLYCOL 3350 17 G/17G
17 POWDER, FOR SOLUTION ORAL DAILY PRN
Status: DISCONTINUED | OUTPATIENT
Start: 2024-01-01 | End: 2024-01-01

## 2024-01-01 RX ORDER — DEXTROSE MONOHYDRATE 50 MG/ML
100 INJECTION, SOLUTION INTRAVENOUS CONTINUOUS
Status: DISCONTINUED | OUTPATIENT
Start: 2024-01-01 | End: 2024-01-01

## 2024-01-01 RX ORDER — GARLIC EXTRACT 500 MG
1 CAPSULE ORAL DAILY
Status: DISCONTINUED | OUTPATIENT
Start: 2024-01-01 | End: 2024-01-01

## 2024-01-01 RX ORDER — CARBOXYMETHYLCELLULOSE SODIUM 5 MG/ML
2 SOLUTION/ DROPS OPHTHALMIC 3 TIMES DAILY PRN
Status: DISCONTINUED | OUTPATIENT
Start: 2024-01-01 | End: 2024-01-01

## 2024-01-01 RX ORDER — MIRTAZAPINE 30 MG/1
30 TABLET, FILM COATED ORAL NIGHTLY
COMMUNITY

## 2024-01-01 RX ADMIN — ERYTHROMYCIN 1 APPLICATION: 5 OINTMENT OPHTHALMIC at 23:17

## 2024-01-01 RX ADMIN — MORPHINE SULFATE 2 MG: 2 INJECTION, SOLUTION INTRAMUSCULAR; INTRAVENOUS at 13:37

## 2024-01-01 RX ADMIN — Medication 10 ML: at 01:57

## 2024-01-01 RX ADMIN — MORPHINE SULFATE 2 MG: 2 INJECTION, SOLUTION INTRAMUSCULAR; INTRAVENOUS at 23:16

## 2024-01-01 RX ADMIN — POTASSIUM CHLORIDE 10 MEQ: 7.46 INJECTION, SOLUTION INTRAVENOUS at 15:11

## 2024-01-01 RX ADMIN — SODIUM CHLORIDE 2034 ML: 9 INJECTION, SOLUTION INTRAVENOUS at 09:20

## 2024-01-01 RX ADMIN — NOREPINEPHRINE BITARTRATE 0.18 MCG/KG/MIN: 0.03 INJECTION, SOLUTION INTRAVENOUS at 07:00

## 2024-01-01 RX ADMIN — Medication 10 ML: at 08:38

## 2024-01-01 RX ADMIN — POTASSIUM CHLORIDE 10 MEQ: 7.46 INJECTION, SOLUTION INTRAVENOUS at 20:02

## 2024-01-01 RX ADMIN — SCOPOLAMINE 1 PATCH: 1.5 PATCH, EXTENDED RELEASE TRANSDERMAL at 16:39

## 2024-01-01 RX ADMIN — POTASSIUM CHLORIDE 10 MEQ: 7.46 INJECTION, SOLUTION INTRAVENOUS at 21:03

## 2024-01-01 RX ADMIN — POTASSIUM CHLORIDE 10 MEQ: 7.46 INJECTION, SOLUTION INTRAVENOUS at 18:42

## 2024-01-01 RX ADMIN — Medication 10 ML: at 21:27

## 2024-01-01 RX ADMIN — VANCOMYCIN HYDROCHLORIDE 1250 MG: 5 INJECTION, POWDER, LYOPHILIZED, FOR SOLUTION INTRAVENOUS at 11:57

## 2024-01-01 RX ADMIN — MAGNESIUM SULFATE HEPTAHYDRATE 4 G: 40 INJECTION, SOLUTION INTRAVENOUS at 12:30

## 2024-01-01 RX ADMIN — POTASSIUM CHLORIDE 10 MEQ: 7.46 INJECTION, SOLUTION INTRAVENOUS at 16:15

## 2024-01-01 RX ADMIN — ASPIRIN 300 MG: 300 SUPPOSITORY RECTAL at 11:09

## 2024-01-01 RX ADMIN — AZTREONAM 2 G: 2 INJECTION, POWDER, LYOPHILIZED, FOR SOLUTION INTRAMUSCULAR; INTRAVENOUS at 10:50

## 2024-01-01 RX ADMIN — ERTAPENEM 1000 MG: 1 INJECTION, POWDER, LYOPHILIZED, FOR SOLUTION INTRAMUSCULAR; INTRAVENOUS at 08:18

## 2024-01-01 RX ADMIN — NOREPINEPHRINE BITARTRATE 0.02 MCG/KG/MIN: 0.03 INJECTION, SOLUTION INTRAVENOUS at 13:22

## 2024-01-01 RX ADMIN — MORPHINE SULFATE 2 MG: 2 INJECTION, SOLUTION INTRAMUSCULAR; INTRAVENOUS at 08:10

## 2024-01-01 RX ADMIN — Medication 10 ML: at 08:47

## 2024-01-01 RX ADMIN — Medication 10 ML: at 08:14

## 2024-01-01 RX ADMIN — LORAZEPAM 1 MG: 2 INJECTION INTRAMUSCULAR; INTRAVENOUS at 09:57

## 2024-01-01 RX ADMIN — MORPHINE SULFATE 2 MG: 2 INJECTION, SOLUTION INTRAMUSCULAR; INTRAVENOUS at 08:38

## 2024-01-01 RX ADMIN — MORPHINE SULFATE 2 MG: 2 INJECTION, SOLUTION INTRAMUSCULAR; INTRAVENOUS at 20:14

## 2024-01-01 RX ADMIN — POTASSIUM CHLORIDE 10 MEQ: 7.46 INJECTION, SOLUTION INTRAVENOUS at 17:22

## 2024-01-01 RX ADMIN — Medication 10 ML: at 21:05

## 2024-02-19 ENCOUNTER — LAB REQUISITION (OUTPATIENT)
Dept: LAB | Facility: HOSPITAL | Age: 89
End: 2024-02-19
Payer: MEDICARE

## 2024-02-19 DIAGNOSIS — M19.90 UNSPECIFIED OSTEOARTHRITIS, UNSPECIFIED SITE: ICD-10-CM

## 2024-02-19 LAB — URATE SERPL-MCNC: 8.3 MG/DL (ref 2.4–5.7)

## 2024-02-19 PROCEDURE — 84550 ASSAY OF BLOOD/URIC ACID: CPT | Performed by: INTERNAL MEDICINE

## 2024-03-11 ENCOUNTER — LAB REQUISITION (OUTPATIENT)
Dept: LAB | Facility: HOSPITAL | Age: 89
End: 2024-03-11
Payer: MEDICARE

## 2024-03-11 DIAGNOSIS — N18.9 CHRONIC KIDNEY DISEASE, UNSPECIFIED: ICD-10-CM

## 2024-03-11 LAB
BACTERIA UR QL AUTO: ABNORMAL /HPF
BILIRUB UR QL STRIP: NEGATIVE
CLARITY UR: ABNORMAL
COLOR UR: ABNORMAL
GLUCOSE UR STRIP-MCNC: NEGATIVE MG/DL
HGB UR QL STRIP.AUTO: ABNORMAL
HYALINE CASTS UR QL AUTO: ABNORMAL /LPF
KETONES UR QL STRIP: ABNORMAL
LEUKOCYTE ESTERASE UR QL STRIP.AUTO: ABNORMAL
NITRITE UR QL STRIP: POSITIVE
PH UR STRIP.AUTO: 5.5 [PH] (ref 5–8)
PROT UR QL STRIP: ABNORMAL
RBC # UR STRIP: ABNORMAL /HPF
REF LAB TEST METHOD: ABNORMAL
SP GR UR STRIP: 1.02 (ref 1–1.03)
SQUAMOUS #/AREA URNS HPF: ABNORMAL /HPF
UROBILINOGEN UR QL STRIP: ABNORMAL
WBC # UR STRIP: ABNORMAL /HPF

## 2024-03-11 PROCEDURE — 87077 CULTURE AEROBIC IDENTIFY: CPT | Performed by: INTERNAL MEDICINE

## 2024-03-11 PROCEDURE — 87086 URINE CULTURE/COLONY COUNT: CPT | Performed by: INTERNAL MEDICINE

## 2024-03-11 PROCEDURE — 81001 URINALYSIS AUTO W/SCOPE: CPT | Performed by: INTERNAL MEDICINE

## 2024-03-11 PROCEDURE — 87186 SC STD MICRODIL/AGAR DIL: CPT | Performed by: INTERNAL MEDICINE

## 2024-03-14 LAB
BACTERIA SPEC AEROBE CULT: ABNORMAL
BACTERIA SPEC AEROBE CULT: ABNORMAL

## 2024-04-24 ENCOUNTER — LAB REQUISITION (OUTPATIENT)
Dept: LAB | Facility: HOSPITAL | Age: 89
End: 2024-04-24
Payer: MEDICARE

## 2024-04-24 DIAGNOSIS — E11.22 TYPE 2 DIABETES MELLITUS WITH DIABETIC CHRONIC KIDNEY DISEASE: ICD-10-CM

## 2024-04-24 LAB
ANION GAP SERPL CALCULATED.3IONS-SCNC: 10.3 MMOL/L (ref 5–15)
ANISOCYTOSIS BLD QL: ABNORMAL
BACTERIA UR QL AUTO: ABNORMAL /HPF
BASOPHILS # BLD MANUAL: 0.12 10*3/MM3 (ref 0–0.2)
BASOPHILS NFR BLD MANUAL: 1 % (ref 0–1.5)
BILIRUB UR QL STRIP: NEGATIVE
BUN SERPL-MCNC: 31 MG/DL (ref 8–23)
BUN/CREAT SERPL: 25.4 (ref 7–25)
CALCIUM SPEC-SCNC: 9 MG/DL (ref 8.6–10.5)
CHLORIDE SERPL-SCNC: 105 MMOL/L (ref 98–107)
CLARITY UR: CLEAR
CO2 SERPL-SCNC: 27.7 MMOL/L (ref 22–29)
COLOR UR: ABNORMAL
CREAT SERPL-MCNC: 1.22 MG/DL (ref 0.57–1)
DEPRECATED RDW RBC AUTO: 54.3 FL (ref 37–54)
EGFRCR SERPLBLD CKD-EPI 2021: 42.8 ML/MIN/1.73
EOSINOPHIL # BLD MANUAL: 0.25 10*3/MM3 (ref 0–0.4)
EOSINOPHIL NFR BLD MANUAL: 2 % (ref 0.3–6.2)
ERYTHROCYTE [DISTWIDTH] IN BLOOD BY AUTOMATED COUNT: 17.6 % (ref 12.3–15.4)
GLUCOSE SERPL-MCNC: 117 MG/DL (ref 65–99)
GLUCOSE UR STRIP-MCNC: NEGATIVE MG/DL
HCT VFR BLD AUTO: 39.3 % (ref 34–46.6)
HGB BLD-MCNC: 12.6 G/DL (ref 12–15.9)
HGB UR QL STRIP.AUTO: NEGATIVE
HYALINE CASTS UR QL AUTO: ABNORMAL /LPF
KETONES UR QL STRIP: ABNORMAL
LEUKOCYTE ESTERASE UR QL STRIP.AUTO: ABNORMAL
LYMPHOCYTES # BLD MANUAL: 2.5 10*3/MM3 (ref 0.7–3.1)
LYMPHOCYTES NFR BLD MANUAL: 7 % (ref 5–12)
MCH RBC QN AUTO: 27.3 PG (ref 26.6–33)
MCHC RBC AUTO-ENTMCNC: 32.1 G/DL (ref 31.5–35.7)
MCV RBC AUTO: 85.2 FL (ref 79–97)
MONOCYTES # BLD: 0.87 10*3/MM3 (ref 0.1–0.9)
NEUTROPHILS # BLD AUTO: 8.74 10*3/MM3 (ref 1.7–7)
NEUTROPHILS NFR BLD MANUAL: 69 % (ref 42.7–76)
NEUTS BAND NFR BLD MANUAL: 1 % (ref 0–5)
NITRITE UR QL STRIP: NEGATIVE
PH UR STRIP.AUTO: 5.5 [PH] (ref 5–8)
PLAT MORPH BLD: NORMAL
PLATELET # BLD AUTO: 236 10*3/MM3 (ref 140–450)
PMV BLD AUTO: 12.2 FL (ref 6–12)
POTASSIUM SERPL-SCNC: 3.9 MMOL/L (ref 3.5–5.2)
PROT UR QL STRIP: NEGATIVE
RBC # BLD AUTO: 4.61 10*6/MM3 (ref 3.77–5.28)
RBC # UR STRIP: ABNORMAL /HPF
REF LAB TEST METHOD: ABNORMAL
SODIUM SERPL-SCNC: 143 MMOL/L (ref 136–145)
SP GR UR STRIP: 1.02 (ref 1–1.03)
SQUAMOUS #/AREA URNS HPF: ABNORMAL /HPF
UROBILINOGEN UR QL STRIP: ABNORMAL
VARIANT LYMPHS NFR BLD MANUAL: 20 % (ref 19.6–45.3)
WBC # UR STRIP: ABNORMAL /HPF
WBC NRBC COR # BLD AUTO: 12.48 10*3/MM3 (ref 3.4–10.8)

## 2024-04-24 PROCEDURE — 85027 COMPLETE CBC AUTOMATED: CPT | Performed by: INTERNAL MEDICINE

## 2024-04-24 PROCEDURE — 85007 BL SMEAR W/DIFF WBC COUNT: CPT | Performed by: INTERNAL MEDICINE

## 2024-04-24 PROCEDURE — 81001 URINALYSIS AUTO W/SCOPE: CPT | Performed by: INTERNAL MEDICINE

## 2024-04-24 PROCEDURE — 87086 URINE CULTURE/COLONY COUNT: CPT | Performed by: INTERNAL MEDICINE

## 2024-04-24 PROCEDURE — 80048 BASIC METABOLIC PNL TOTAL CA: CPT | Performed by: INTERNAL MEDICINE

## 2024-04-25 LAB — BACTERIA SPEC AEROBE CULT: ABNORMAL

## 2024-05-06 ENCOUNTER — LAB REQUISITION (OUTPATIENT)
Dept: LAB | Facility: HOSPITAL | Age: 89
End: 2024-05-06
Payer: MEDICARE

## 2024-05-06 DIAGNOSIS — A41.9 SEPSIS, UNSPECIFIED ORGANISM: ICD-10-CM

## 2024-05-06 DIAGNOSIS — N18.9 CHRONIC KIDNEY DISEASE, UNSPECIFIED: ICD-10-CM

## 2024-05-06 PROCEDURE — 87186 SC STD MICRODIL/AGAR DIL: CPT | Performed by: NURSE PRACTITIONER

## 2024-05-06 PROCEDURE — 87077 CULTURE AEROBIC IDENTIFY: CPT | Performed by: NURSE PRACTITIONER

## 2024-05-06 PROCEDURE — 87070 CULTURE OTHR SPECIMN AEROBIC: CPT | Performed by: NURSE PRACTITIONER

## 2024-05-06 PROCEDURE — 87205 SMEAR GRAM STAIN: CPT | Performed by: NURSE PRACTITIONER

## 2024-05-09 LAB
BACTERIA SPEC AEROBE CULT: ABNORMAL
BACTERIA SPEC AEROBE CULT: ABNORMAL
GRAM STN SPEC: ABNORMAL
GRAM STN SPEC: ABNORMAL

## 2024-07-19 PROCEDURE — 87070 CULTURE OTHR SPECIMN AEROBIC: CPT

## 2024-07-22 ENCOUNTER — LAB (OUTPATIENT)
Dept: LAB | Facility: HOSPITAL | Age: 89
End: 2024-07-22
Payer: MEDICARE

## 2024-07-22 ENCOUNTER — TRANSCRIBE ORDERS (OUTPATIENT)
Dept: ADMINISTRATIVE | Facility: HOSPITAL | Age: 89
End: 2024-07-22
Payer: MEDICARE

## 2024-07-22 DIAGNOSIS — N18.9 CHRONIC KIDNEY DISEASE, UNSPECIFIED CKD STAGE: ICD-10-CM

## 2024-07-22 DIAGNOSIS — N18.9 CHRONIC KIDNEY DISEASE, UNSPECIFIED CKD STAGE: Primary | ICD-10-CM

## 2024-07-24 LAB — BACTERIA SPEC AEROBE CULT: NORMAL

## 2024-09-20 ENCOUNTER — LAB REQUISITION (OUTPATIENT)
Dept: LAB | Facility: HOSPITAL | Age: 89
End: 2024-09-20
Payer: MEDICARE

## 2024-09-20 DIAGNOSIS — D72.829 ELEVATED WHITE BLOOD CELL COUNT, UNSPECIFIED: ICD-10-CM

## 2024-09-20 LAB
BACTERIA UR QL AUTO: ABNORMAL /HPF
HOLD SPECIMEN: NORMAL
HYALINE CASTS UR QL AUTO: ABNORMAL /LPF
RBC # UR STRIP: ABNORMAL /HPF
REF LAB TEST METHOD: ABNORMAL
SQUAMOUS #/AREA URNS HPF: ABNORMAL /HPF
WBC # UR STRIP: ABNORMAL /HPF

## 2024-09-20 PROCEDURE — 87086 URINE CULTURE/COLONY COUNT: CPT | Performed by: INTERNAL MEDICINE

## 2024-09-20 PROCEDURE — 81015 MICROSCOPIC EXAM OF URINE: CPT | Performed by: INTERNAL MEDICINE

## 2024-09-21 LAB — BACTERIA SPEC AEROBE CULT: NORMAL

## 2024-10-18 ENCOUNTER — LAB REQUISITION (OUTPATIENT)
Dept: LAB | Facility: HOSPITAL | Age: 89
End: 2024-10-18
Payer: MEDICARE

## 2024-10-18 DIAGNOSIS — N18.9 CHRONIC KIDNEY DISEASE, UNSPECIFIED: ICD-10-CM

## 2024-10-18 LAB
BACTERIA UR QL AUTO: ABNORMAL /HPF
BILIRUB UR QL STRIP: NEGATIVE
CLARITY UR: ABNORMAL
COLOR UR: ABNORMAL
GLUCOSE UR STRIP-MCNC: NEGATIVE MG/DL
HGB UR QL STRIP.AUTO: ABNORMAL
HYALINE CASTS UR QL AUTO: ABNORMAL /LPF
KETONES UR QL STRIP: NEGATIVE
LEUKOCYTE ESTERASE UR QL STRIP.AUTO: ABNORMAL
NITRITE UR QL STRIP: POSITIVE
PH UR STRIP.AUTO: 5.5 [PH] (ref 5–8)
PROT UR QL STRIP: ABNORMAL
RBC # UR STRIP: ABNORMAL /HPF
REF LAB TEST METHOD: ABNORMAL
SP GR UR STRIP: 1.02 (ref 1–1.03)
SQUAMOUS #/AREA URNS HPF: ABNORMAL /HPF
UROBILINOGEN UR QL STRIP: ABNORMAL
WBC # UR STRIP: ABNORMAL /HPF

## 2024-10-18 PROCEDURE — 87086 URINE CULTURE/COLONY COUNT: CPT | Performed by: INTERNAL MEDICINE

## 2024-10-18 PROCEDURE — 81001 URINALYSIS AUTO W/SCOPE: CPT | Performed by: INTERNAL MEDICINE

## 2024-10-19 LAB — BACTERIA SPEC AEROBE CULT: NORMAL

## 2024-12-03 ENCOUNTER — APPOINTMENT (OUTPATIENT)
Dept: GENERAL RADIOLOGY | Facility: HOSPITAL | Age: 89
DRG: 853 | End: 2024-12-03
Payer: MEDICARE

## 2024-12-03 ENCOUNTER — HOSPITAL ENCOUNTER (INPATIENT)
Facility: HOSPITAL | Age: 89
LOS: 6 days | Discharge: NURSING, REHAB OR LONG-TERM CARE | DRG: 853 | End: 2024-12-09
Attending: STUDENT IN AN ORGANIZED HEALTH CARE EDUCATION/TRAINING PROGRAM | Admitting: FAMILY MEDICINE
Payer: MEDICARE

## 2024-12-03 DIAGNOSIS — R78.81 BACTEREMIA: ICD-10-CM

## 2024-12-03 DIAGNOSIS — M86.9 OSTEOMYELITIS OF OTHER SITE, UNSPECIFIED TYPE: ICD-10-CM

## 2024-12-03 DIAGNOSIS — N39.0 ACUTE UTI: ICD-10-CM

## 2024-12-03 DIAGNOSIS — A41.9 SEPSIS, DUE TO UNSPECIFIED ORGANISM, UNSPECIFIED WHETHER ACUTE ORGAN DYSFUNCTION PRESENT: Primary | ICD-10-CM

## 2024-12-03 DIAGNOSIS — L89.95 PRESSURE INJURY, UNSTAGEABLE, UNSPECIFIED LOCATION: ICD-10-CM

## 2024-12-03 LAB
ALBUMIN SERPL-MCNC: 3.2 G/DL (ref 3.5–5.2)
ALBUMIN/GLOB SERPL: 0.9 G/DL
ALP SERPL-CCNC: 111 U/L (ref 39–117)
ALT SERPL W P-5'-P-CCNC: 11 U/L (ref 1–33)
ANION GAP SERPL CALCULATED.3IONS-SCNC: 14.8 MMOL/L (ref 5–15)
APTT PPP: 41.5 SECONDS (ref 26.5–34.5)
AST SERPL-CCNC: 20 U/L (ref 1–32)
BACTERIA UR QL AUTO: ABNORMAL /HPF
BASOPHILS # BLD AUTO: 0.03 10*3/MM3 (ref 0–0.2)
BASOPHILS NFR BLD AUTO: 0.2 % (ref 0–1.5)
BILIRUB SERPL-MCNC: 0.7 MG/DL (ref 0–1.2)
BILIRUB UR QL STRIP: NEGATIVE
BUN SERPL-MCNC: 50 MG/DL (ref 8–23)
BUN/CREAT SERPL: 50 (ref 7–25)
CALCIUM SPEC-SCNC: 8.5 MG/DL (ref 8.6–10.5)
CHLORIDE SERPL-SCNC: 100 MMOL/L (ref 98–107)
CLARITY UR: ABNORMAL
CO2 SERPL-SCNC: 25.2 MMOL/L (ref 22–29)
COLOR UR: YELLOW
CREAT SERPL-MCNC: 1 MG/DL (ref 0.57–1)
D-LACTATE SERPL-SCNC: 1.6 MMOL/L (ref 0.5–2)
D-LACTATE SERPL-SCNC: 3.4 MMOL/L (ref 0.5–2)
DEPRECATED RDW RBC AUTO: 46.7 FL (ref 37–54)
EGFRCR SERPLBLD CKD-EPI 2021: 54.3 ML/MIN/1.73
EOSINOPHIL # BLD AUTO: 0.02 10*3/MM3 (ref 0–0.4)
EOSINOPHIL NFR BLD AUTO: 0.1 % (ref 0.3–6.2)
ERYTHROCYTE [DISTWIDTH] IN BLOOD BY AUTOMATED COUNT: 14.6 % (ref 12.3–15.4)
GLOBULIN UR ELPH-MCNC: 3.5 GM/DL
GLUCOSE BLDC GLUCOMTR-MCNC: 212 MG/DL (ref 70–130)
GLUCOSE SERPL-MCNC: 189 MG/DL (ref 65–99)
GLUCOSE UR STRIP-MCNC: NEGATIVE MG/DL
HCT VFR BLD AUTO: 38 % (ref 34–46.6)
HGB BLD-MCNC: 11.8 G/DL (ref 12–15.9)
HGB UR QL STRIP.AUTO: ABNORMAL
HOLD SPECIMEN: NORMAL
HYALINE CASTS UR QL AUTO: ABNORMAL /LPF
IMM GRANULOCYTES # BLD AUTO: 0.07 10*3/MM3 (ref 0–0.05)
IMM GRANULOCYTES NFR BLD AUTO: 0.5 % (ref 0–0.5)
INR PPP: 1.82 (ref 0.9–1.1)
KETONES UR QL STRIP: NEGATIVE
LEUKOCYTE ESTERASE UR QL STRIP.AUTO: ABNORMAL
LYMPHOCYTES # BLD AUTO: 0.84 10*3/MM3 (ref 0.7–3.1)
LYMPHOCYTES NFR BLD AUTO: 5.5 % (ref 19.6–45.3)
MCH RBC QN AUTO: 26.9 PG (ref 26.6–33)
MCHC RBC AUTO-ENTMCNC: 31.1 G/DL (ref 31.5–35.7)
MCV RBC AUTO: 86.8 FL (ref 79–97)
MONOCYTES # BLD AUTO: 0.65 10*3/MM3 (ref 0.1–0.9)
MONOCYTES NFR BLD AUTO: 4.3 % (ref 5–12)
NEUTROPHILS NFR BLD AUTO: 13.56 10*3/MM3 (ref 1.7–7)
NEUTROPHILS NFR BLD AUTO: 89.4 % (ref 42.7–76)
NITRITE UR QL STRIP: NEGATIVE
NRBC BLD AUTO-RTO: 0 /100 WBC (ref 0–0.2)
PH UR STRIP.AUTO: 7 [PH] (ref 5–8)
PLATELET # BLD AUTO: 285 10*3/MM3 (ref 140–450)
PMV BLD AUTO: 11.8 FL (ref 6–12)
POTASSIUM SERPL-SCNC: 3.8 MMOL/L (ref 3.5–5.2)
PROCALCITONIN SERPL-MCNC: 0.96 NG/ML (ref 0–0.25)
PROT SERPL-MCNC: 6.7 G/DL (ref 6–8.5)
PROT UR QL STRIP: ABNORMAL
PROTHROMBIN TIME: 21.2 SECONDS (ref 12.1–14.7)
RBC # BLD AUTO: 4.38 10*6/MM3 (ref 3.77–5.28)
RBC # UR STRIP: ABNORMAL /HPF
REF LAB TEST METHOD: ABNORMAL
SODIUM SERPL-SCNC: 140 MMOL/L (ref 136–145)
SP GR UR STRIP: 1.02 (ref 1–1.03)
SQUAMOUS #/AREA URNS HPF: ABNORMAL /HPF
UROBILINOGEN UR QL STRIP: ABNORMAL
WBC # UR STRIP: ABNORMAL /HPF
WBC NRBC COR # BLD AUTO: 15.17 10*3/MM3 (ref 3.4–10.8)
WHOLE BLOOD HOLD COAG: NORMAL
WHOLE BLOOD HOLD SPECIMEN: NORMAL

## 2024-12-03 PROCEDURE — 36415 COLL VENOUS BLD VENIPUNCTURE: CPT

## 2024-12-03 PROCEDURE — 25810000003 SODIUM CHLORIDE 0.9 % SOLUTION: Performed by: FAMILY MEDICINE

## 2024-12-03 PROCEDURE — 87040 BLOOD CULTURE FOR BACTERIA: CPT | Performed by: STUDENT IN AN ORGANIZED HEALTH CARE EDUCATION/TRAINING PROGRAM

## 2024-12-03 PROCEDURE — 84145 PROCALCITONIN (PCT): CPT | Performed by: STUDENT IN AN ORGANIZED HEALTH CARE EDUCATION/TRAINING PROGRAM

## 2024-12-03 PROCEDURE — 87154 CUL TYP ID BLD PTHGN 6+ TRGT: CPT | Performed by: STUDENT IN AN ORGANIZED HEALTH CARE EDUCATION/TRAINING PROGRAM

## 2024-12-03 PROCEDURE — 25010000002 MEROPENEM PER 100 MG: Performed by: STUDENT IN AN ORGANIZED HEALTH CARE EDUCATION/TRAINING PROGRAM

## 2024-12-03 PROCEDURE — 25810000003 SODIUM CHLORIDE 0.9 % SOLUTION

## 2024-12-03 PROCEDURE — 87070 CULTURE OTHR SPECIMN AEROBIC: CPT | Performed by: STUDENT IN AN ORGANIZED HEALTH CARE EDUCATION/TRAINING PROGRAM

## 2024-12-03 PROCEDURE — 87205 SMEAR GRAM STAIN: CPT | Performed by: STUDENT IN AN ORGANIZED HEALTH CARE EDUCATION/TRAINING PROGRAM

## 2024-12-03 PROCEDURE — 83605 ASSAY OF LACTIC ACID: CPT | Performed by: STUDENT IN AN ORGANIZED HEALTH CARE EDUCATION/TRAINING PROGRAM

## 2024-12-03 PROCEDURE — 85610 PROTHROMBIN TIME: CPT | Performed by: STUDENT IN AN ORGANIZED HEALTH CARE EDUCATION/TRAINING PROGRAM

## 2024-12-03 PROCEDURE — 93005 ELECTROCARDIOGRAM TRACING: CPT | Performed by: STUDENT IN AN ORGANIZED HEALTH CARE EDUCATION/TRAINING PROGRAM

## 2024-12-03 PROCEDURE — 99223 1ST HOSP IP/OBS HIGH 75: CPT | Performed by: FAMILY MEDICINE

## 2024-12-03 PROCEDURE — 25810000003 SEPSIS FLUID NS 0.9 % SOLUTION: Performed by: FAMILY MEDICINE

## 2024-12-03 PROCEDURE — 87086 URINE CULTURE/COLONY COUNT: CPT | Performed by: NURSE PRACTITIONER

## 2024-12-03 PROCEDURE — 87186 SC STD MICRODIL/AGAR DIL: CPT | Performed by: STUDENT IN AN ORGANIZED HEALTH CARE EDUCATION/TRAINING PROGRAM

## 2024-12-03 PROCEDURE — 81001 URINALYSIS AUTO W/SCOPE: CPT | Performed by: STUDENT IN AN ORGANIZED HEALTH CARE EDUCATION/TRAINING PROGRAM

## 2024-12-03 PROCEDURE — 87077 CULTURE AEROBIC IDENTIFY: CPT | Performed by: STUDENT IN AN ORGANIZED HEALTH CARE EDUCATION/TRAINING PROGRAM

## 2024-12-03 PROCEDURE — 87147 CULTURE TYPE IMMUNOLOGIC: CPT | Performed by: STUDENT IN AN ORGANIZED HEALTH CARE EDUCATION/TRAINING PROGRAM

## 2024-12-03 PROCEDURE — 87077 CULTURE AEROBIC IDENTIFY: CPT | Performed by: NURSE PRACTITIONER

## 2024-12-03 PROCEDURE — 80053 COMPREHEN METABOLIC PANEL: CPT | Performed by: STUDENT IN AN ORGANIZED HEALTH CARE EDUCATION/TRAINING PROGRAM

## 2024-12-03 PROCEDURE — 99285 EMERGENCY DEPT VISIT HI MDM: CPT

## 2024-12-03 PROCEDURE — P9612 CATHETERIZE FOR URINE SPEC: HCPCS

## 2024-12-03 PROCEDURE — 85730 THROMBOPLASTIN TIME PARTIAL: CPT | Performed by: STUDENT IN AN ORGANIZED HEALTH CARE EDUCATION/TRAINING PROGRAM

## 2024-12-03 PROCEDURE — 25010000002 HEPARIN (PORCINE) PER 1000 UNITS: Performed by: FAMILY MEDICINE

## 2024-12-03 PROCEDURE — 25810000003 SEPSIS FLUID NS 0.9 % SOLUTION: Performed by: STUDENT IN AN ORGANIZED HEALTH CARE EDUCATION/TRAINING PROGRAM

## 2024-12-03 PROCEDURE — 82948 REAGENT STRIP/BLOOD GLUCOSE: CPT

## 2024-12-03 PROCEDURE — 87186 SC STD MICRODIL/AGAR DIL: CPT | Performed by: NURSE PRACTITIONER

## 2024-12-03 PROCEDURE — 25010000002 VANCOMYCIN 5 G RECONSTITUTED SOLUTION: Performed by: FAMILY MEDICINE

## 2024-12-03 PROCEDURE — 93010 ELECTROCARDIOGRAM REPORT: CPT | Performed by: INTERNAL MEDICINE

## 2024-12-03 PROCEDURE — 71045 X-RAY EXAM CHEST 1 VIEW: CPT

## 2024-12-03 PROCEDURE — 25010000002 KETOROLAC TROMETHAMINE PER 15 MG: Performed by: STUDENT IN AN ORGANIZED HEALTH CARE EDUCATION/TRAINING PROGRAM

## 2024-12-03 PROCEDURE — 71045 X-RAY EXAM CHEST 1 VIEW: CPT | Performed by: RADIOLOGY

## 2024-12-03 PROCEDURE — 85025 COMPLETE CBC W/AUTO DIFF WBC: CPT | Performed by: STUDENT IN AN ORGANIZED HEALTH CARE EDUCATION/TRAINING PROGRAM

## 2024-12-03 RX ORDER — ONDANSETRON 4 MG/1
4 TABLET, ORALLY DISINTEGRATING ORAL EVERY 8 HOURS PRN
Status: CANCELLED | OUTPATIENT
Start: 2024-12-03

## 2024-12-03 RX ORDER — BISACODYL 5 MG/1
5 TABLET, DELAYED RELEASE ORAL DAILY PRN
Status: DISCONTINUED | OUTPATIENT
Start: 2024-12-03 | End: 2024-12-09 | Stop reason: HOSPADM

## 2024-12-03 RX ORDER — ONDANSETRON 2 MG/ML
4 INJECTION INTRAMUSCULAR; INTRAVENOUS EVERY 6 HOURS PRN
Status: DISCONTINUED | OUTPATIENT
Start: 2024-12-03 | End: 2024-12-09 | Stop reason: HOSPADM

## 2024-12-03 RX ORDER — POLYETHYLENE GLYCOL 3350 17 G/17G
17 POWDER, FOR SOLUTION ORAL DAILY PRN
Status: DISCONTINUED | OUTPATIENT
Start: 2024-12-03 | End: 2024-12-09 | Stop reason: HOSPADM

## 2024-12-03 RX ORDER — BISACODYL 10 MG
10 SUPPOSITORY, RECTAL RECTAL DAILY PRN
Status: DISCONTINUED | OUTPATIENT
Start: 2024-12-03 | End: 2024-12-09 | Stop reason: HOSPADM

## 2024-12-03 RX ORDER — HYDROCODONE BITARTRATE AND ACETAMINOPHEN 5; 325 MG/1; MG/1
1 TABLET ORAL EVERY 6 HOURS PRN
Status: DISCONTINUED | OUTPATIENT
Start: 2024-12-03 | End: 2024-12-09 | Stop reason: HOSPADM

## 2024-12-03 RX ORDER — ASCORBIC ACID 500 MG
500 TABLET ORAL 2 TIMES DAILY
COMMUNITY

## 2024-12-03 RX ORDER — ERYTHROMYCIN 5 MG/G
1 OINTMENT OPHTHALMIC NIGHTLY
COMMUNITY

## 2024-12-03 RX ORDER — ZINC SULFATE 50(220)MG
220 CAPSULE ORAL 2 TIMES DAILY
COMMUNITY

## 2024-12-03 RX ORDER — NITROGLYCERIN 0.4 MG/1
0.4 TABLET SUBLINGUAL
Status: DISCONTINUED | OUTPATIENT
Start: 2024-12-03 | End: 2024-12-09 | Stop reason: HOSPADM

## 2024-12-03 RX ORDER — SENNOSIDES A AND B 8.6 MG/1
2 TABLET, FILM COATED ORAL 2 TIMES DAILY
COMMUNITY

## 2024-12-03 RX ORDER — FLUORIDE TOOTHPASTE
15 TOOTHPASTE DENTAL EVERY 12 HOURS PRN
COMMUNITY

## 2024-12-03 RX ORDER — ONDANSETRON 4 MG/1
4 TABLET, ORALLY DISINTEGRATING ORAL EVERY 6 HOURS PRN
Status: DISCONTINUED | OUTPATIENT
Start: 2024-12-03 | End: 2024-12-09 | Stop reason: HOSPADM

## 2024-12-03 RX ORDER — MINERAL OIL AND WHITE PETROLATUM 30; 940 MG/G; MG/G
1 OINTMENT OPHTHALMIC EVERY 12 HOURS PRN
COMMUNITY

## 2024-12-03 RX ORDER — KETOROLAC TROMETHAMINE 30 MG/ML
15 INJECTION, SOLUTION INTRAMUSCULAR; INTRAVENOUS ONCE
Status: COMPLETED | OUTPATIENT
Start: 2024-12-03 | End: 2024-12-03

## 2024-12-03 RX ORDER — SODIUM CHLORIDE 9 MG/ML
40 INJECTION, SOLUTION INTRAVENOUS AS NEEDED
Status: DISCONTINUED | OUTPATIENT
Start: 2024-12-03 | End: 2024-12-09 | Stop reason: HOSPADM

## 2024-12-03 RX ORDER — HEPARIN SODIUM 5000 [USP'U]/ML
5000 INJECTION, SOLUTION INTRAVENOUS; SUBCUTANEOUS EVERY 12 HOURS SCHEDULED
Status: DISCONTINUED | OUTPATIENT
Start: 2024-12-03 | End: 2024-12-09 | Stop reason: HOSPADM

## 2024-12-03 RX ORDER — SODIUM CHLORIDE 0.9 % (FLUSH) 0.9 %
10 SYRINGE (ML) INJECTION AS NEEDED
Status: DISCONTINUED | OUTPATIENT
Start: 2024-12-03 | End: 2024-12-09 | Stop reason: HOSPADM

## 2024-12-03 RX ORDER — ACETAMINOPHEN 325 MG/1
650 TABLET ORAL ONCE
Status: COMPLETED | OUTPATIENT
Start: 2024-12-03 | End: 2024-12-03

## 2024-12-03 RX ORDER — ONDANSETRON 4 MG/1
4 TABLET, FILM COATED ORAL EVERY 6 HOURS PRN
COMMUNITY

## 2024-12-03 RX ORDER — SODIUM CHLORIDE 9 MG/ML
100 INJECTION, SOLUTION INTRAVENOUS CONTINUOUS
Status: ACTIVE | OUTPATIENT
Start: 2024-12-03 | End: 2024-12-04

## 2024-12-03 RX ORDER — METOPROLOL TARTRATE 25 MG/1
25 TABLET, FILM COATED ORAL 2 TIMES DAILY
COMMUNITY
End: 2024-12-09 | Stop reason: HOSPADM

## 2024-12-03 RX ORDER — SODIUM CHLORIDE 0.9 % (FLUSH) 0.9 %
10 SYRINGE (ML) INJECTION EVERY 12 HOURS SCHEDULED
Status: DISCONTINUED | OUTPATIENT
Start: 2024-12-03 | End: 2024-12-09 | Stop reason: HOSPADM

## 2024-12-03 RX ORDER — AMOXICILLIN 250 MG
2 CAPSULE ORAL 2 TIMES DAILY PRN
Status: DISCONTINUED | OUTPATIENT
Start: 2024-12-03 | End: 2024-12-09 | Stop reason: HOSPADM

## 2024-12-03 RX ORDER — SENNOSIDES A AND B 8.6 MG/1
2 TABLET, FILM COATED ORAL 2 TIMES DAILY
Status: CANCELLED | OUTPATIENT
Start: 2024-12-03

## 2024-12-03 RX ADMIN — SODIUM CHLORIDE 1851 ML: 9 INJECTION, SOLUTION INTRAVENOUS at 15:50

## 2024-12-03 RX ADMIN — VANCOMYCIN HYDROCHLORIDE 1250 MG: 5 INJECTION, POWDER, LYOPHILIZED, FOR SOLUTION INTRAVENOUS at 21:13

## 2024-12-03 RX ADMIN — SODIUM CHLORIDE 500 ML: 9 INJECTION, SOLUTION INTRAVENOUS at 23:56

## 2024-12-03 RX ADMIN — MEROPENEM 1000 MG: 1 INJECTION, POWDER, FOR SOLUTION INTRAVENOUS at 15:51

## 2024-12-03 RX ADMIN — SODIUM CHLORIDE 100 ML/HR: 9 INJECTION, SOLUTION INTRAVENOUS at 21:13

## 2024-12-03 RX ADMIN — Medication 10 ML: at 22:03

## 2024-12-03 RX ADMIN — HEPARIN SODIUM 5000 UNITS: 5000 INJECTION INTRAVENOUS; SUBCUTANEOUS at 21:12

## 2024-12-03 RX ADMIN — SODIUM CHLORIDE 1851 ML: 9 INJECTION, SOLUTION INTRAVENOUS at 20:02

## 2024-12-03 RX ADMIN — KETOROLAC TROMETHAMINE 15 MG: 30 INJECTION, SOLUTION INTRAMUSCULAR; INTRAVENOUS at 17:49

## 2024-12-03 RX ADMIN — ACETAMINOPHEN 650 MG: 325 TABLET ORAL at 15:51

## 2024-12-03 NOTE — H&P
[facility]   HISTORY AND PHYSICAL    Patient Name: Tsering Alberto  : 1935  MRN: [id number]  Primary Care Physician:  Nicolette Colbert MD  Date of admission: 12/3/2024    Subjective   Subjective     Chief Complaint: Fever and chills    History of Present Illness  Patient is an 89-year-old female with past medical history of diabetes mellitus and hypertension.  She resides at a local nursing home and was sent to the emergency department because of an elevated temp as high as 101.  She also has had some lower abdominal pain and they have been dealing with a sacral decubitus that is very slow to heal.  Here in the emergency department her temperature was 102 and her heart rate remained above 90.  She was found to have 2 sources of infection including a urinary tract infection as well as the sacral decubitus that appears to have cellulitic border and be draining purulent material.  Patient's lactic acid was also elevated at 3.4 as well as her procalcitonin is 0.96.  Her urine analysis showed 4+ bacteria but was nitrite negative.  Patient has had several different bacteria within her urine over the last year and a half including Klebsiella E. coli and Enterobacter.  They obtain blood cultures wound cultures and her urine will be cultured.  The patient has had Enterococcus in her urine was multidrug-resistant but susceptible to vancomycin.  The patient will be admitted for further evaluation IV hydration and IV antibiotics  Review of Systems   As stated above in his present illness all other systems were reviewed and subsequently negative  Personal History     Past Medical History:   Diagnosis Date    Diabetes mellitus     Hypertension        Past Surgical History:   Procedure Laterality Date    CARDIAC SURGERY      CATARACT EXTRACTION      COLONOSCOPY      REPLACEMENT TOTAL KNEE         Family History: family history includes Breast cancer in an other family member; Ovarian cancer in her sister.  Otherwise pertinent FHx was reviewed and not pertinent to current issue.    Social History:  reports that she has never smoked. She has never used smokeless tobacco. She reports that she does not drink alcohol.    Home Medications:  Biotene dry mouth, Dextran 70-Hypromellose, SITagliptin, Systane Nighttime, amLODIPine, apixaban, ascorbic acid, collagenase, erythromycin, metoprolol tartrate, ondansetron, senna, sertraline, and zinc sulfate    Allergies:  Allergies   Allergen Reactions    Penicillins Rash       Objective    Objective     Vitals:   Temp:  [102 °F (38.9 °C)] 102 °F (38.9 °C)  Heart Rate:  [87-99] 92  Resp:  [18] 18  BP: (111-161)/(64-98) 128/68    Physical Exam    Result Review    Result Review:  I have personally reviewed the results from the time of this admission to 12/3/2024 18:49 EST and agree with these findings:  []  Laboratory list / accordion  []  Microbiology  []  Radiology  []  EKG/Telemetry   []  Cardiology/Vascular   []  Pathology  []  Old records  []  Other:  Most notable findings include:          [place] Urine Culture Tube - Urine, Catheter  Collected: 12/03/24 1538  Final result  Specimen: Urine, Catheter      Extra Tube Hold for add-ons.             12/03/24 1618  Urinalysis With Microscopic If Indicated (No Culture) - Urine, Catheter  Collected: 12/03/24 1538  Final result  Specimen: Urine, Catheter    Color, UA Yellow Bilirubin, UA Negative   Appearance, UA Cloudy Abnormal  Blood, UA Small (1+) Abnormal    pH, UA 7.0 Protein,  mg/dL (2+) Abnormal    Specific Gravity, UA 1.016 Leuk Esterase, UA Large (3+) Abnormal    Glucose, UA Negative Nitrite, UA Negative   Ketones, UA Negative Urobilinogen, UA 1.0 E.U./dL          12/03/24 1618  Urinalysis, Microscopic Only - Urine, Catheter  Collected: 12/03/24 1538  Final result  Specimen: Urine, Catheter    RBC, UA 3-5 Abnormal  /HPF Squamous Epithelial Cells, UA 0-2 /HPF   WBC, UA Too Numerous to Count Abnormal  /HPF Hyaline Casts,  UA 3-6 /LPF   Bacteria, UA 4+ Abnormal  /HPF Methodology Automated Microscopy          12/03/24 1602  Lactic Acid, Plasma  Collected: 12/03/24 1526  Final result  Specimen: Blood from Arm, Left    Lactate 3.4 High Critical  mmol/L            12/03/24 1557  Procalcitonin  Collected: 12/03/24 1526  Final result  Specimen: Blood from Arm, Left    Procalcitonin 0.96 High  ng/mL            12/03/24 1549  Comprehensive Metabolic Panel  Collected: 12/03/24 1526  Final result  Specimen: Blood from Arm, Left    Glucose 189 High  mg/dL ALT (SGPT) 11 U/L   BUN 50 High  mg/dL AST (SGOT) 20 U/L   Creatinine 1.00 mg/dL Alkaline Phosphatase 111 U/L   Sodium 140 mmol/L Total Bilirubin 0.7 mg/dL   Potassium 3.8 mmol/L  Globulin 3.5 gm/dL   Chloride 100 mmol/L A/G Ratio 0.9 g/dL   CO2 25.2 mmol/L BUN/Creatinine Ratio 50.0 High    Calcium 8.5 Low  mg/dL Anion Gap 14.8 mmol/L   Total Protein 6.7 g/dL eGFR 54.3 Low  mL/min/1.73   Albumin 3.2 Low  g/dL            12/03/24 1538  Protime-INR  Collected: 12/03/24 1526  Final result  Specimen: Blood from Arm, Left    Protime 21.2 High  Seconds INR 1.82 High           12/03/24 1538  aPTT  Collected: 12/03/24 1526  Final result  Specimen: Blood from Arm, Left    PTT 41.5 High  seconds            12/03/24 1532  [place] Draw  Collected: 12/03/24 1526  Final result  Specimen: Blood from Arm, Left           12/03/24 1532  Green Top (Gel)  Collected: 12/03/24 1526  Final result  Specimen: Blood from Arm, Left    Extra Tube Hold for add-ons.             12/03/24 1532  Lavender Top  Collected: 12/03/24 1526  Final result  Specimen: Blood from Arm, Left    Extra Tube hold for add-on             12/03/24 1532  Gold Top - SST  Collected: 12/03/24 1526  Final result  Specimen: Blood from Arm, Left    Extra Tube Hold for add-ons.             12/03/24 1532  Light Blue Top  Collected: 12/03/24 1526  Final result  Specimen: Blood from Arm, Left    Extra Tube Hold for add-ons.              12/03/24 1529  CBC & Differential  Collected: 12/03/24 1526  Final result  Specimen: Blood from Arm, Left           12/03/24 1529  CBC Auto Differential  Collected: 12/03/24 1526  Final result  Specimen: Blood from Arm, Left    WBC 15.17 High  10*3/mm3 Lymphocyte % 5.5 Low  %   RBC 4.38 10*6/mm3 Monocyte % 4.3 Low  %   Hemoglobin 11.8 Low  g/dL Eosinophil % 0.1 Low  %   Hematocrit 38.0 % Basophil % 0.2 %   MCV 86.8 fL Immature Grans % 0.5 %   MCH 26.9 pg Neutrophils, Absolute 13.56 High  10*3/mm3   MCHC 31.1 Low  g/dL Lymphocytes, Absolute 0.84 10*3/mm3   RDW 14.6 % Monocytes, Absolute 0.65 10*3/mm3   RDW-SD 46.7 fl Eosinophils, Absolute 0.02 10*3/mm3   MPV 11.8 fL Basophils, Absolute 0.03 10*3/mm3   Platelets 285 10*3/mm3 Immature Grans, Absolute 0.07 High  10*3/mm3   Neutrophil % 89.4 High  % nRBC 0.0 /100 WBC                XR Chest 1 View  Performed: 12/03/24 1554  Final          Impression: No acute cardiopulmonary process. This report was finalized on 12/3/2024 4:00 PM by Diana Ozuna M.D..        Assessment & Plan   Assessment / Plan     Brief Patient Summary:  Tsering Alberto is a 89 y.o. female who presents to the emergency department was found to be septic with a urinary tract infection and infected sacral decubitus.  She will be admitted for further evaluation and treatment    Active Hospital Problems/plan:  Severe sepsis  Complicated urinary tract infection  Infected decubitus ulcer  Leukocytosis  -Patient will be admitted to the hospital service  - Patient has been started on the sepsis protocol  - Patient received fluid resuscitation in the emergency department we will continue to provide IV fluids in the form of normal saline at 100 cc/h  - I started the patient on IV ceftriaxone and IV vancomycin for broad-spectrum coverage  - We are awaiting urine blood and wound cultures  - Monitor the patient's laboratory data closely  - Keep patient on telemetry for closer monitoring  - Subjectively  treat the fever with Tylenol  - Consulted wound care  - With the patient potentially suffering from multiple sources of infection also consult infectious disease    ANTHONY most likely secondary to volume depletion  - We will fluid resuscitate as stated above  - We will repeat lab work in the a.m. to ensure improvement in her renal functions  -Try and avoid nephrotoxic drugs although the patient may require NSAIDs if her fever remains elevated despite Tylenol administration      VTE Prophylaxis:  Pharmacologic VTE prophylaxis orders are signed & held. Pharmacologic VTE prophylaxis orders are present.        CODE STATUS:       Admission Status:  I believe this patient meets inpatient status.    Jordy Johnson, DO

## 2024-12-03 NOTE — CASE MANAGEMENT/SOCIAL WORK
Discharge Planning Assessment   [place]     Patient Name: Tsering Alberto  MRN: [id number]  Today's Date: 12/3/2024    Admit Date: 12/3/2024    Plan: Patient is a resident of [facility] and will return at discharge. Patient will need EMS transport at discharge.   Discharge Needs Assessment       Row Name 12/03/24 1622       Living Environment    People in Home facility resident    Current Living Arrangements extended care facility    Potentially Unsafe Housing Conditions none    In the past 12 months has the electric, gas, oil, or water company threatened to shut off services in your home? No    Provides Primary Care For no one    Family Caregiver if Needed child(chang), adult    Family Caregiver Names Rajesh Alberto Son [phone]    Quality of Family Relationships unable to assess    Able to Return to Prior Arrangements no       Resource/Environmental Concerns    Resource/Environmental Concerns none    Transportation Concerns none       Transportation Needs    In the past 12 months, has lack of transportation kept you from medical appointments or from getting medications? no    In the past 12 months, has lack of transportation kept you from meetings, work, or from getting things needed for daily living? No       Food Insecurity    Within the past 12 months, you worried that your food would run out before you got the money to buy more. Never true    Within the past 12 months, the food you bought just didn't last and you didn't have money to get more. Never true       Transition Planning    Patient/Family Anticipates Transition to long-term care facility    Patient/Family Anticipated Services at Transition none    Transportation Anticipated public transportation       Discharge Needs Assessment    Readmission Within the Last 30 Days no previous admission in last 30 days    Concerns to be Addressed discharge planning    Do you want help finding or keeping work or a job? I do not need or want help    Do you want help with  school or training? For example, starting or completing job training or getting a high school diploma, GED or equivalent No    Anticipated Changes Related to Illness none    Equipment Needed After Discharge none                   Discharge Plan       Row Name 12/03/24 3986       Plan    Plan Patient is a resident of [facility] and will return at discharge. Patient will need EMS transport at discharge.    Patient/Family in Agreement with Plan yes               Madeline Olsen

## 2024-12-03 NOTE — ED PROVIDER NOTES
Subjective   History of Present Illness  89-year-old female presents from local nursing home due to concerns for chills and fever.  Nursing of staff noted concerns for a sacral decubitus ulcer.  Patient noted her primary concern at this time was pain on her buttocks and shaking.  No chest pain.  No shortness of breath.  No headache or meningismal symptoms.  Does have slight pain with urination.  No considerable abdominal pain.  No obvious aggravating or alleviating factors.  Vitals stable.      Review of Systems   Constitutional:  Positive for chills and fever.   Genitourinary:  Positive for dysuria.   Skin:  Positive for wound.   All other systems reviewed and are negative.      Past Medical History:   Diagnosis Date    Diabetes mellitus     Hypertension        Allergies   Allergen Reactions    Penicillins Rash       Past Surgical History:   Procedure Laterality Date    CARDIAC SURGERY      CATARACT EXTRACTION      COLONOSCOPY      REPLACEMENT TOTAL KNEE         Family History   Problem Relation Age of Onset    Ovarian cancer Sister     Breast cancer Other        Social History     Socioeconomic History    Marital status: Unknown   Tobacco Use    Smoking status: Never    Smokeless tobacco: Never   Substance and Sexual Activity    Alcohol use: No           Objective   Physical Exam  Constitutional:       General: She is not in acute distress.     Appearance: Normal appearance. She is not ill-appearing.   HENT:      Head: Normocephalic and atraumatic.      Right Ear: External ear normal.      Left Ear: External ear normal.      Nose: Nose normal.      Mouth/Throat:      Mouth: Mucous membranes are moist.   Eyes:      Extraocular Movements: Extraocular movements intact.      Pupils: Pupils are equal, round, and reactive to light.   Cardiovascular:      Rate and Rhythm: Normal rate and regular rhythm.      Heart sounds: No murmur heard.  Pulmonary:      Effort: Pulmonary effort is normal. No respiratory distress.       Breath sounds: Normal breath sounds. No wheezing.   Abdominal:      General: Bowel sounds are normal.      Palpations: Abdomen is soft.      Tenderness: There is no abdominal tenderness.   Musculoskeletal:         General: No deformity or signs of injury. Normal range of motion.      Cervical back: Normal range of motion and neck supple.   Skin:     General: Skin is warm and dry.      Findings: Erythema present.             Comments: Unstageable decubitus ulcer with exudative drainage and surrounding erythema.   Neurological:      General: No focal deficit present.      Mental Status: She is alert and oriented to person, place, and time. Mental status is at baseline.      Cranial Nerves: No cranial nerve deficit.   Psychiatric:         Mood and Affect: Mood normal.         Behavior: Behavior normal.         Thought Content: Thought content normal.         Procedures           ED Course  ED Course as of 12/03/24 1802   Tue Dec 03, 2024   1602 EKG interpretation normal sinus rhythm 97 bpm QTc is 421 incomplete right bundle branch block no significant ST elevations or depressions.  Electronically signed by Elier Ayers DO, 12/03/24, 4:02 PM EST.   [GF]      ED Course User Index  [GF] Elier Ayers DO                                        XR Chest 1 View    Result Date: 12/3/2024  No acute cardiopulmonary process.   This report was finalized on 12/3/2024 4:00 PM by Diana Ozuna M.D..         Results for orders placed or performed during the hospital encounter of 12/03/24   Comprehensive Metabolic Panel    Collection Time: 12/03/24  3:26 PM    Specimen: Arm, Left; Blood   Result Value Ref Range    Glucose 189 (H) 65 - 99 mg/dL    BUN 50 (H) 8 - 23 mg/dL    Creatinine 1.00 0.57 - 1.00 mg/dL    Sodium 140 136 - 145 mmol/L    Potassium 3.8 3.5 - 5.2 mmol/L    Chloride 100 98 - 107 mmol/L    CO2 25.2 22.0 - 29.0 mmol/L    Calcium 8.5 (L) 8.6 - 10.5 mg/dL    Total Protein 6.7 6.0 - 8.5 g/dL    Albumin 3.2  (L) 3.5 - 5.2 g/dL    ALT (SGPT) 11 1 - 33 U/L    AST (SGOT) 20 1 - 32 U/L    Alkaline Phosphatase 111 39 - 117 U/L    Total Bilirubin 0.7 0.0 - 1.2 mg/dL    Globulin 3.5 gm/dL    A/G Ratio 0.9 g/dL    BUN/Creatinine Ratio 50.0 (H) 7.0 - 25.0    Anion Gap 14.8 5.0 - 15.0 mmol/L    eGFR 54.3 (L) >60.0 mL/min/1.73   Protime-INR    Collection Time: 12/03/24  3:26 PM    Specimen: Arm, Left; Blood   Result Value Ref Range    Protime 21.2 (H) 12.1 - 14.7 Seconds    INR 1.82 (H) 0.90 - 1.10   aPTT    Collection Time: 12/03/24  3:26 PM    Specimen: Arm, Left; Blood   Result Value Ref Range    PTT 41.5 (H) 26.5 - 34.5 seconds   Lactic Acid, Plasma    Collection Time: 12/03/24  3:26 PM    Specimen: Arm, Left; Blood   Result Value Ref Range    Lactate 3.4 (C) 0.5 - 2.0 mmol/L   Procalcitonin    Collection Time: 12/03/24  3:26 PM    Specimen: Arm, Left; Blood   Result Value Ref Range    Procalcitonin 0.96 (H) 0.00 - 0.25 ng/mL   CBC Auto Differential    Collection Time: 12/03/24  3:26 PM    Specimen: Arm, Left; Blood   Result Value Ref Range    WBC 15.17 (H) 3.40 - 10.80 10*3/mm3    RBC 4.38 3.77 - 5.28 10*6/mm3    Hemoglobin 11.8 (L) 12.0 - 15.9 g/dL    Hematocrit 38.0 34.0 - 46.6 %    MCV 86.8 79.0 - 97.0 fL    MCH 26.9 26.6 - 33.0 pg    MCHC 31.1 (L) 31.5 - 35.7 g/dL    RDW 14.6 12.3 - 15.4 %    RDW-SD 46.7 37.0 - 54.0 fl    MPV 11.8 6.0 - 12.0 fL    Platelets 285 140 - 450 10*3/mm3    Neutrophil % 89.4 (H) 42.7 - 76.0 %    Lymphocyte % 5.5 (L) 19.6 - 45.3 %    Monocyte % 4.3 (L) 5.0 - 12.0 %    Eosinophil % 0.1 (L) 0.3 - 6.2 %    Basophil % 0.2 0.0 - 1.5 %    Immature Grans % 0.5 0.0 - 0.5 %    Neutrophils, Absolute 13.56 (H) 1.70 - 7.00 10*3/mm3    Lymphocytes, Absolute 0.84 0.70 - 3.10 10*3/mm3    Monocytes, Absolute 0.65 0.10 - 0.90 10*3/mm3    Eosinophils, Absolute 0.02 0.00 - 0.40 10*3/mm3    Basophils, Absolute 0.03 0.00 - 0.20 10*3/mm3    Immature Grans, Absolute 0.07 (H) 0.00 - 0.05 10*3/mm3    nRBC 0.0 0.0 - 0.2  /100 WBC   Green Top (Gel)    Collection Time: 12/03/24  3:26 PM   Result Value Ref Range    Extra Tube Hold for add-ons.    Lavender Top    Collection Time: 12/03/24  3:26 PM   Result Value Ref Range    Extra Tube hold for add-on    Gold Top - SST    Collection Time: 12/03/24  3:26 PM   Result Value Ref Range    Extra Tube Hold for add-ons.    Light Blue Top    Collection Time: 12/03/24  3:26 PM   Result Value Ref Range    Extra Tube Hold for add-ons.    Urinalysis With Microscopic If Indicated (No Culture) - Urine, Catheter    Collection Time: 12/03/24  3:38 PM    Specimen: Urine, Catheter   Result Value Ref Range    Color, UA Yellow Yellow, Straw    Appearance, UA Cloudy (A) Clear    pH, UA 7.0 5.0 - 8.0    Specific Gravity, UA 1.016 1.005 - 1.030    Glucose, UA Negative Negative    Ketones, UA Negative Negative    Bilirubin, UA Negative Negative    Blood, UA Small (1+) (A) Negative    Protein,  mg/dL (2+) (A) Negative    Leuk Esterase, UA Large (3+) (A) Negative    Nitrite, UA Negative Negative    Urobilinogen, UA 1.0 E.U./dL 0.2 - 1.0 E.U./dL   Urinalysis, Microscopic Only - Urine, Catheter    Collection Time: 12/03/24  3:38 PM    Specimen: Urine, Catheter   Result Value Ref Range    RBC, UA 3-5 (A) None Seen, 0-2 /HPF    WBC, UA Too Numerous to Count (A) None Seen, 0-2 /HPF    Bacteria, UA 4+ (A) None Seen /HPF    Squamous Epithelial Cells, UA 0-2 None Seen, 0-2 /HPF    Hyaline Casts, UA 3-6 None Seen /LPF    Methodology Automated Microscopy    [place] Urine Culture Tube - Urine, Catheter    Collection Time: 12/03/24  3:38 PM    Specimen: Urine, Catheter   Result Value Ref Range    Extra Tube Hold for add-ons.    ECG 12 Lead Other; Sepsis    Collection Time: 12/03/24  3:57 PM   Result Value Ref Range    QT Interval 332 ms    QTC Interval 421 ms                   Medical Decision Making  CBC listed.  Leukocytosis noted.  Sepsis screening positive.  Sepsis protocol initiated.  IV antibiotics and IV fluids  given.  Urinalysis also notes concerns for UTI.  Chest x-ray unremarkable.  Recommend admission for further work up and treatment.  Hospitalist team consulted and made aware of the patient.  Consults and orders placed per hospitalist request.  Patient was agreeable to admission plan.  Vitals stable on admission.    Amount and/or Complexity of Data Reviewed  Labs: ordered. Decision-making details documented in ED Course.  Radiology: ordered. Decision-making details documented in ED Course.  ECG/medicine tests: ordered.    Risk  OTC drugs.  Prescription drug management.  Decision regarding hospitalization.        Final diagnoses:   Sepsis, due to unspecified organism, unspecified whether acute organ dysfunction present   Pressure injury, unstageable, unspecified location   Acute UTI       ED Disposition  ED Disposition       ED Disposition   Decision to Admit    Condition   --    Comment   Level of Care: Telemetry [5]   Diagnosis: Sepsis [[id number]]   Certification: I Certify That Inpatient Hospital Services Are Medically Necessary For Greater Than 2 Midnights                 No follow-up provider specified.       Medication List      No changes were made to your prescriptions during this visit.            Franck Barnes, DO  12/03/24 1804

## 2024-12-03 NOTE — ED NOTES
Sepsis bolus completed at this time. Patient tolerated well. Patient current VS as follows    117/51 - 87 - 92% - 101.1 - 18    Provider made aware of completion.

## 2024-12-04 PROBLEM — L89.95 PRESSURE INJURY, UNSTAGEABLE: Status: ACTIVE | Noted: 2024-12-03

## 2024-12-04 LAB
ANION GAP SERPL CALCULATED.3IONS-SCNC: 11.4 MMOL/L (ref 5–15)
BACTERIA BLD CULT: ABNORMAL
BASOPHILS # BLD AUTO: 0.02 10*3/MM3 (ref 0–0.2)
BASOPHILS # BLD AUTO: 0.04 10*3/MM3 (ref 0–0.2)
BASOPHILS NFR BLD AUTO: 0.2 % (ref 0–1.5)
BASOPHILS NFR BLD AUTO: 0.2 % (ref 0–1.5)
BOTTLE TYPE: ABNORMAL
BUN SERPL-MCNC: 53 MG/DL (ref 8–23)
BUN/CREAT SERPL: 52.5 (ref 7–25)
C AURIS DNA SPEC QL NAA+NON-PROBE: NOT DETECTED
CALCIUM SPEC-SCNC: 7.6 MG/DL (ref 8.6–10.5)
CHLORIDE SERPL-SCNC: 110 MMOL/L (ref 98–107)
CO2 SERPL-SCNC: 20.6 MMOL/L (ref 22–29)
CREAT SERPL-MCNC: 1.01 MG/DL (ref 0.57–1)
DEPRECATED RDW RBC AUTO: 49.1 FL (ref 37–54)
DEPRECATED RDW RBC AUTO: 49.6 FL (ref 37–54)
EGFRCR SERPLBLD CKD-EPI 2021: 53.7 ML/MIN/1.73
EOSINOPHIL # BLD AUTO: 0 10*3/MM3 (ref 0–0.4)
EOSINOPHIL # BLD AUTO: 0.01 10*3/MM3 (ref 0–0.4)
EOSINOPHIL NFR BLD AUTO: 0 % (ref 0.3–6.2)
EOSINOPHIL NFR BLD AUTO: 0.1 % (ref 0.3–6.2)
ERYTHROCYTE [DISTWIDTH] IN BLOOD BY AUTOMATED COUNT: 14.7 % (ref 12.3–15.4)
ERYTHROCYTE [DISTWIDTH] IN BLOOD BY AUTOMATED COUNT: 14.9 % (ref 12.3–15.4)
GLUCOSE SERPL-MCNC: 125 MG/DL (ref 65–99)
HCT VFR BLD AUTO: 30.8 % (ref 34–46.6)
HCT VFR BLD AUTO: 35.6 % (ref 34–46.6)
HGB BLD-MCNC: 10.8 G/DL (ref 12–15.9)
HGB BLD-MCNC: 9.2 G/DL (ref 12–15.9)
IMM GRANULOCYTES # BLD AUTO: 0.08 10*3/MM3 (ref 0–0.05)
IMM GRANULOCYTES # BLD AUTO: 0.19 10*3/MM3 (ref 0–0.05)
IMM GRANULOCYTES NFR BLD AUTO: 0.6 % (ref 0–0.5)
IMM GRANULOCYTES NFR BLD AUTO: 1 % (ref 0–0.5)
LYMPHOCYTES # BLD AUTO: 1.03 10*3/MM3 (ref 0.7–3.1)
LYMPHOCYTES # BLD AUTO: 1.07 10*3/MM3 (ref 0.7–3.1)
LYMPHOCYTES NFR BLD AUTO: 5.4 % (ref 19.6–45.3)
LYMPHOCYTES NFR BLD AUTO: 7.8 % (ref 19.6–45.3)
MAGNESIUM SERPL-MCNC: 1.3 MG/DL (ref 1.6–2.4)
MCH RBC QN AUTO: 26.9 PG (ref 26.6–33)
MCH RBC QN AUTO: 27.3 PG (ref 26.6–33)
MCHC RBC AUTO-ENTMCNC: 29.9 G/DL (ref 31.5–35.7)
MCHC RBC AUTO-ENTMCNC: 30.3 G/DL (ref 31.5–35.7)
MCV RBC AUTO: 89.9 FL (ref 79–97)
MCV RBC AUTO: 90.1 FL (ref 79–97)
MONOCYTES # BLD AUTO: 0.85 10*3/MM3 (ref 0.1–0.9)
MONOCYTES # BLD AUTO: 1.74 10*3/MM3 (ref 0.1–0.9)
MONOCYTES NFR BLD AUTO: 6.4 % (ref 5–12)
MONOCYTES NFR BLD AUTO: 8.8 % (ref 5–12)
NEUTROPHILS NFR BLD AUTO: 11.28 10*3/MM3 (ref 1.7–7)
NEUTROPHILS NFR BLD AUTO: 16.78 10*3/MM3 (ref 1.7–7)
NEUTROPHILS NFR BLD AUTO: 84.6 % (ref 42.7–76)
NEUTROPHILS NFR BLD AUTO: 84.9 % (ref 42.7–76)
NRBC BLD AUTO-RTO: 0 /100 WBC (ref 0–0.2)
NRBC BLD AUTO-RTO: 0 /100 WBC (ref 0–0.2)
PLATELET # BLD AUTO: 222 10*3/MM3 (ref 140–450)
PLATELET # BLD AUTO: 224 10*3/MM3 (ref 140–450)
PMV BLD AUTO: 11.5 FL (ref 6–12)
PMV BLD AUTO: 11.8 FL (ref 6–12)
POTASSIUM SERPL-SCNC: 3.2 MMOL/L (ref 3.5–5.2)
POTASSIUM SERPL-SCNC: 3.4 MMOL/L (ref 3.5–5.2)
QT INTERVAL: 332 MS
QT INTERVAL: 450 MS
QTC INTERVAL: 421 MS
QTC INTERVAL: 464 MS
RBC # BLD AUTO: 3.42 10*6/MM3 (ref 3.77–5.28)
RBC # BLD AUTO: 3.96 10*6/MM3 (ref 3.77–5.28)
SODIUM SERPL-SCNC: 142 MMOL/L (ref 136–145)
WBC NRBC COR # BLD AUTO: 13.27 10*3/MM3 (ref 3.4–10.8)
WBC NRBC COR # BLD AUTO: 19.82 10*3/MM3 (ref 3.4–10.8)

## 2024-12-04 PROCEDURE — 87040 BLOOD CULTURE FOR BACTERIA: CPT | Performed by: NURSE PRACTITIONER

## 2024-12-04 PROCEDURE — 25810000003 SODIUM CHLORIDE 0.9 % SOLUTION: Performed by: FAMILY MEDICINE

## 2024-12-04 PROCEDURE — 99222 1ST HOSP IP/OBS MODERATE 55: CPT | Performed by: SURGERY

## 2024-12-04 PROCEDURE — 25010000002 MAGNESIUM SULFATE 2 GM/50ML SOLUTION: Performed by: FAMILY MEDICINE

## 2024-12-04 PROCEDURE — 85025 COMPLETE CBC W/AUTO DIFF WBC: CPT | Performed by: FAMILY MEDICINE

## 2024-12-04 PROCEDURE — 84132 ASSAY OF SERUM POTASSIUM: CPT | Performed by: FAMILY MEDICINE

## 2024-12-04 PROCEDURE — 83735 ASSAY OF MAGNESIUM: CPT | Performed by: FAMILY MEDICINE

## 2024-12-04 PROCEDURE — 25010000002 CEFTRIAXONE PER 250 MG: Performed by: NURSE PRACTITIONER

## 2024-12-04 PROCEDURE — 93005 ELECTROCARDIOGRAM TRACING: CPT | Performed by: FAMILY MEDICINE

## 2024-12-04 PROCEDURE — 99232 SBSQ HOSP IP/OBS MODERATE 35: CPT

## 2024-12-04 PROCEDURE — 25010000002 HEPARIN (PORCINE) PER 1000 UNITS: Performed by: FAMILY MEDICINE

## 2024-12-04 PROCEDURE — 80048 BASIC METABOLIC PNL TOTAL CA: CPT | Performed by: FAMILY MEDICINE

## 2024-12-04 PROCEDURE — 99222 1ST HOSP IP/OBS MODERATE 55: CPT | Performed by: NURSE PRACTITIONER

## 2024-12-04 PROCEDURE — 93010 ELECTROCARDIOGRAM REPORT: CPT | Performed by: INTERNAL MEDICINE

## 2024-12-04 RX ORDER — CARBOXYMETHYLCELLULOSE SODIUM 5 MG/ML
2 SOLUTION/ DROPS OPHTHALMIC 3 TIMES DAILY
Status: DISCONTINUED | OUTPATIENT
Start: 2024-12-04 | End: 2024-12-09 | Stop reason: HOSPADM

## 2024-12-04 RX ORDER — VANCOMYCIN/0.9 % SOD CHLORIDE 1 G/100 ML
15 PLASTIC BAG, INJECTION (ML) INTRAVENOUS EVERY 24 HOURS
Status: DISCONTINUED | OUTPATIENT
Start: 2024-12-04 | End: 2024-12-05

## 2024-12-04 RX ORDER — ZINC SULFATE 50(220)MG
220 CAPSULE ORAL 2 TIMES DAILY
Status: DISCONTINUED | OUTPATIENT
Start: 2024-12-04 | End: 2024-12-09 | Stop reason: HOSPADM

## 2024-12-04 RX ORDER — ASCORBIC ACID 500 MG
500 TABLET ORAL 2 TIMES DAILY
Status: DISCONTINUED | OUTPATIENT
Start: 2024-12-04 | End: 2024-12-09 | Stop reason: HOSPADM

## 2024-12-04 RX ORDER — POTASSIUM CHLORIDE 1500 MG/1
40 TABLET, EXTENDED RELEASE ORAL EVERY 4 HOURS
Status: COMPLETED | OUTPATIENT
Start: 2024-12-04 | End: 2024-12-04

## 2024-12-04 RX ORDER — AMLODIPINE BESYLATE 5 MG/1
5 TABLET ORAL DAILY
Status: DISCONTINUED | OUTPATIENT
Start: 2024-12-04 | End: 2024-12-09 | Stop reason: HOSPADM

## 2024-12-04 RX ORDER — METOPROLOL TARTRATE 25 MG/1
25 TABLET, FILM COATED ORAL 2 TIMES DAILY
Status: DISCONTINUED | OUTPATIENT
Start: 2024-12-04 | End: 2024-12-09 | Stop reason: HOSPADM

## 2024-12-04 RX ORDER — POTASSIUM CHLORIDE 1.5 G/1.58G
40 POWDER, FOR SOLUTION ORAL EVERY 4 HOURS
Status: DISPENSED | OUTPATIENT
Start: 2024-12-05 | End: 2024-12-05

## 2024-12-04 RX ORDER — MAGNESIUM SULFATE HEPTAHYDRATE 40 MG/ML
2 INJECTION, SOLUTION INTRAVENOUS
Status: COMPLETED | OUTPATIENT
Start: 2024-12-04 | End: 2024-12-04

## 2024-12-04 RX ORDER — SODIUM HYPOCHLORITE 1.25 MG/ML
SOLUTION TOPICAL 2 TIMES DAILY
Status: DISCONTINUED | OUTPATIENT
Start: 2024-12-04 | End: 2024-12-09 | Stop reason: HOSPADM

## 2024-12-04 RX ORDER — ERYTHROMYCIN 5 MG/G
1 OINTMENT OPHTHALMIC NIGHTLY
Status: DISCONTINUED | OUTPATIENT
Start: 2024-12-04 | End: 2024-12-09 | Stop reason: HOSPADM

## 2024-12-04 RX ADMIN — Medication 1000 MG: at 21:39

## 2024-12-04 RX ADMIN — SERTRALINE HYDROCHLORIDE 100 MG: 50 TABLET ORAL at 11:10

## 2024-12-04 RX ADMIN — CARBOXYMETHYLCELLULOSE SODIUM 2 DROP: 5 SOLUTION/ DROPS OPHTHALMIC at 21:40

## 2024-12-04 RX ADMIN — SODIUM CHLORIDE 100 ML/HR: 9 INJECTION, SOLUTION INTRAVENOUS at 14:51

## 2024-12-04 RX ADMIN — METOPROLOL TARTRATE 25 MG: 25 TABLET, FILM COATED ORAL at 11:09

## 2024-12-04 RX ADMIN — CARBOXYMETHYLCELLULOSE SODIUM 2 DROP: 5 SOLUTION/ DROPS OPHTHALMIC at 11:09

## 2024-12-04 RX ADMIN — SODIUM CHLORIDE 100 ML/HR: 9 INJECTION, SOLUTION INTRAVENOUS at 05:29

## 2024-12-04 RX ADMIN — CARBOXYMETHYLCELLULOSE SODIUM 2 DROP: 5 SOLUTION/ DROPS OPHTHALMIC at 14:52

## 2024-12-04 RX ADMIN — Medication 220 MG: at 11:09

## 2024-12-04 RX ADMIN — Medication 10 ML: at 21:42

## 2024-12-04 RX ADMIN — ERYTHROMYCIN 1 APPLICATION: 5 OINTMENT OPHTHALMIC at 21:42

## 2024-12-04 RX ADMIN — MAGNESIUM SULFATE HEPTAHYDRATE 2 G: 40 INJECTION, SOLUTION INTRAVENOUS at 14:52

## 2024-12-04 RX ADMIN — HEPARIN SODIUM 5000 UNITS: 5000 INJECTION INTRAVENOUS; SUBCUTANEOUS at 11:12

## 2024-12-04 RX ADMIN — MAGNESIUM SULFATE HEPTAHYDRATE 2 G: 40 INJECTION, SOLUTION INTRAVENOUS at 16:48

## 2024-12-04 RX ADMIN — HEPARIN SODIUM 5000 UNITS: 5000 INJECTION INTRAVENOUS; SUBCUTANEOUS at 21:41

## 2024-12-04 RX ADMIN — Medication 220 MG: at 21:41

## 2024-12-04 RX ADMIN — POTASSIUM CHLORIDE 40 MEQ: 1500 TABLET, EXTENDED RELEASE ORAL at 12:46

## 2024-12-04 RX ADMIN — MAGNESIUM SULFATE HEPTAHYDRATE 2 G: 40 INJECTION, SOLUTION INTRAVENOUS at 12:46

## 2024-12-04 RX ADMIN — LINAGLIPTIN 5 MG: 5 TABLET, FILM COATED ORAL at 11:10

## 2024-12-04 RX ADMIN — Medication 10 ML: at 11:13

## 2024-12-04 RX ADMIN — SODIUM CHLORIDE 2000 MG: 9 INJECTION, SOLUTION INTRAVENOUS at 11:09

## 2024-12-04 RX ADMIN — COLLAGENASE SANTYL 1 APPLICATION: 250 OINTMENT TOPICAL at 21:42

## 2024-12-04 RX ADMIN — DAKIN'S SOLUTION 0.125% (QUARTER STRENGTH): 0.12 SOLUTION at 21:41

## 2024-12-04 RX ADMIN — DAKIN'S SOLUTION 0.125% (QUARTER STRENGTH): 0.12 SOLUTION at 14:52

## 2024-12-04 RX ADMIN — OXYCODONE HYDROCHLORIDE AND ACETAMINOPHEN 500 MG: 500 TABLET ORAL at 21:41

## 2024-12-04 RX ADMIN — OXYCODONE HYDROCHLORIDE AND ACETAMINOPHEN 500 MG: 500 TABLET ORAL at 11:10

## 2024-12-04 RX ADMIN — POTASSIUM CHLORIDE 40 MEQ: 1500 TABLET, EXTENDED RELEASE ORAL at 17:30

## 2024-12-04 RX ADMIN — AMLODIPINE BESYLATE 5 MG: 5 TABLET ORAL at 11:10

## 2024-12-04 NOTE — CASE MANAGEMENT/SOCIAL WORK
Discharge Planning Assessment  [facility]     Patient Name: Tsering Alberto  MRN: [id number]  Today's Date: 12/4/2024    Admit Date: 12/3/2024    Plan: SS received a consult for family/patient request. Pt was admitted on 12/3/24 from [facility]. SS contacted [facility] per [facility] who states pt had a 29 day bed hold upon admission. SS to follow and assist with discharge planning.     Discharge Plan       Row Name 12/04/24 1204       Plan    Plan SS received a consult for family/patient request. Pt was admitted on 12/3/24 from [facility]. SS contacted [facility] per [facility] who states pt had a 29 day bed hold upon admission. SS to follow and assist with discharge planning.    Patient/Family in Agreement with Plan yes          Continued Care and Services - Admitted Since 12/3/2024    No active coordination exists for this encounter.       Expected Discharge Date and Time       Expected Discharge Date Expected Discharge Time    Dec 6, 2024            Demographic Summary       Row Name 12/04/24 1202       General Information    Admission Type inpatient    Referral Source nursing    Reason for Consult discharge planning  SS received a consult for family/patient request.            JULIANNA Heller

## 2024-12-04 NOTE — NURSING NOTE
Unstageable PI to coccyx.  4x3x 2cm.  Wound bed covered in moist, yellow slough.  Small amount of yellow drainage noted to cover dressing.  Jasmin wound red and intact.  Malodor noted.  Order placed to cleanse with 1/4 strength Dakin's, gently pack with fluffed gauze dampened in Dakin's and cover with silicone bordered dressing BID.  Patient would likely benefit from a surgical consult for debridement.  Message sent to Dr. Johnson.    Patient with small abrasion to her right lower leg.  Wound healing.  Will leave open to air.    Mark score 12.  PI prevention orders initiated.       12/04/24 1344   Wound 12/04/24 0114 medial coccyx pressure injury   Placement Date/Time: 12/04/24 0114   Orientation: medial  Location: coccyx  Primary Wound Type: Pressure Injury  Type: pressure injury  Present on Original Admission: Yes   Pressure Injury Stage U   Dressing Appearance dressing loose;moist drainage   Closure None   Base moist;yellow;slough   Periwound redness   Periwound Temperature warm   Periwound Skin Turgor soft   Edges irregular   Wound Length (cm) 4 cm   Wound Width (cm) 3 cm   Wound Depth (cm) 2 cm   Wound Surface Area (cm^2) 12 cm^2   Wound Volume (cm^3) 24 cm^3   Drainage Characteristics/Odor malodorous;yellow   Drainage Amount small

## 2024-12-04 NOTE — PAYOR COMM NOTE
"[facility]  NPI:[id number]    Utilization Review  Contact: Susan Webb RN  Phone: [phone]  Fax:[phone]    INITIATE INPATIENT AUTHORIZATION    Tsering Zacarias (89 y.o. Female)       Date of Birth   1935    Social Security Number       Address   [address] [place] [address]    Home Phone   [phone]    MRN   [id number]       Religious   Unknown    Marital Status   Unknown                            Admission Date   12/3/24    Admission Type   Emergency    Admitting Provider   Jordy Johnson DO    Attending Provider   Jordy Johnson DO    Department, Room/Bed   [address], 3318/1P       Discharge Date       Discharge Disposition       Discharge Destination                                 Attending Provider: Jordy Johnson DO    Allergies: Penicillins    Isolation: Contact   Infection: Candida Auris (rule out) (12/03/24)   Code Status: Not on file    Ht: 177.8 cm (70\")   Wt: 65.9 kg (145 lb 3.2 oz)    Admission Cmt: None   Principal Problem: Sepsis [A41.9]                   Active Insurance as of 12/3/2024       Primary Coverage       Payor Plan Insurance Group Employer/Plan Group    ANTHEM MEDICARE REPLACEMENT ANTHEM MEDICARE ADVANTAGE [id number]       Payor Plan Address Payor Plan Phone Number Payor Plan Fax Number Effective Dates    [address] [phone]  1/1/2024 - None Entered    [facility] [id number]         Subscriber Name Subscriber Birth Date Member ID       TSERING ZACARIAS 1935 [id number]               Secondary Coverage       Payor Plan Insurance Group Employer/Plan Group    [place]        Payor Plan Address Payor Plan Phone Number Payor Plan Fax Number Effective Dates    [address] [phone]  12/3/2024 - None Entered    [facility] [id number]         Subscriber Name Subscriber Birth Date Member ID       TSERING ZACARIAS 1935 [id number]                     Emergency Contacts       Contact " Person (Rel.) Home Phone Work Phone Mobile Phone    Rajesh Alberto (Son) [phone] -- [phone]                 History & Physical        ClayJordy DO at 24 1849          [facility]   HISTORY AND PHYSICAL    Patient Name: Tsering Alberto  : 1935  MRN: [id number]  Primary Care Physician:  Nicolette Colbert MD  Date of admission: 12/3/2024    Subjective  Subjective     Chief Complaint: Fever and chills    History of Present Illness  Patient is an 89-year-old female with past medical history of diabetes mellitus and hypertension.  She resides at a local nursing home and was sent to the emergency department because of an elevated temp as high as 101.  She also has had some lower abdominal pain and they have been dealing with a sacral decubitus that is very slow to heal.  Here in the emergency department her temperature was 102 and her heart rate remained above 90.  She was found to have 2 sources of infection including a urinary tract infection as well as the sacral decubitus that appears to have cellulitic border and be draining purulent material.  Patient's lactic acid was also elevated at 3.4 as well as her procalcitonin is 0.96.  Her urine analysis showed 4+ bacteria but was nitrite negative.  Patient has had several different bacteria within her urine over the last year and a half including Klebsiella E. coli and Enterobacter.  They obtain blood cultures wound cultures and her urine will be cultured.  The patient has had Enterococcus in her urine was multidrug-resistant but susceptible to vancomycin.  The patient will be admitted for further evaluation IV hydration and IV antibiotics  Review of Systems   As stated above in his present illness all other systems were reviewed and subsequently negative  Personal History     Past Medical History:   Diagnosis Date    Diabetes mellitus     Hypertension        Past Surgical History:   Procedure Laterality Date    CARDIAC SURGERY       CATARACT EXTRACTION      COLONOSCOPY      REPLACEMENT TOTAL KNEE         Family History: family history includes Breast cancer in an other family member; Ovarian cancer in her sister. Otherwise pertinent FHx was reviewed and not pertinent to current issue.    Social History:  reports that she has never smoked. She has never used smokeless tobacco. She reports that she does not drink alcohol.    Home Medications:  Biotene dry mouth, Dextran 70-Hypromellose, SITagliptin, Systane Nighttime, amLODIPine, apixaban, ascorbic acid, collagenase, erythromycin, metoprolol tartrate, ondansetron, senna, sertraline, and zinc sulfate    Allergies:  Allergies   Allergen Reactions    Penicillins Rash       Objective   Objective     Vitals:   Temp:  [102 °F (38.9 °C)] 102 °F (38.9 °C)  Heart Rate:  [87-99] 92  Resp:  [18] 18  BP: (111-161)/(64-98) 128/68    Physical Exam    Result Review   Result Review:  I have personally reviewed the results from the time of this admission to 12/3/2024 18:49 EST and agree with these findings:  []  Laboratory list / accordion  []  Microbiology  []  Radiology  []  EKG/Telemetry   []  Cardiology/Vascular   []  Pathology  []  Old records  []  Other:  Most notable findings include:          [place] Urine Culture Tube - Urine, Catheter  Collected: 12/03/24 1538  Final result  Specimen: Urine, Catheter      Extra Tube Hold for add-ons.             12/03/24 1618  Urinalysis With Microscopic If Indicated (No Culture) - Urine, Catheter  Collected: 12/03/24 1538  Final result  Specimen: Urine, Catheter    Color, UA Yellow Bilirubin, UA Negative   Appearance, UA Cloudy Abnormal  Blood, UA Small (1+) Abnormal    pH, UA 7.0 Protein,  mg/dL (2+) Abnormal    Specific Gravity, UA 1.016 Leuk Esterase, UA Large (3+) Abnormal    Glucose, UA Negative Nitrite, UA Negative   Ketones, UA Negative Urobilinogen, UA 1.0 E.U./dL          12/03/24 1618  Urinalysis, Microscopic Only - Urine, Catheter  Collected:  12/03/24 1538  Final result  Specimen: Urine, Catheter    RBC, UA 3-5 Abnormal  /HPF Squamous Epithelial Cells, UA 0-2 /HPF   WBC, UA Too Numerous to Count Abnormal  /HPF Hyaline Casts, UA 3-6 /LPF   Bacteria, UA 4+ Abnormal  /HPF Methodology Automated Microscopy          12/03/24 1602  Lactic Acid, Plasma  Collected: 12/03/24 1526  Final result  Specimen: Blood from Arm, Left    Lactate 3.4 High Critical  mmol/L            12/03/24 1557  Procalcitonin  Collected: 12/03/24 1526  Final result  Specimen: Blood from Arm, Left    Procalcitonin 0.96 High  ng/mL            12/03/24 1549  Comprehensive Metabolic Panel  Collected: 12/03/24 1526  Final result  Specimen: Blood from Arm, Left    Glucose 189 High  mg/dL ALT (SGPT) 11 U/L   BUN 50 High  mg/dL AST (SGOT) 20 U/L   Creatinine 1.00 mg/dL Alkaline Phosphatase 111 U/L   Sodium 140 mmol/L Total Bilirubin 0.7 mg/dL   Potassium 3.8 mmol/L  Globulin 3.5 gm/dL   Chloride 100 mmol/L A/G Ratio 0.9 g/dL   CO2 25.2 mmol/L BUN/Creatinine Ratio 50.0 High    Calcium 8.5 Low  mg/dL Anion Gap 14.8 mmol/L   Total Protein 6.7 g/dL eGFR 54.3 Low  mL/min/1.73   Albumin 3.2 Low  g/dL            12/03/24 1538  Protime-INR  Collected: 12/03/24 1526  Final result  Specimen: Blood from Arm, Left    Protime 21.2 High  Seconds INR 1.82 High           12/03/24 1538  aPTT  Collected: 12/03/24 1526  Final result  Specimen: Blood from Arm, Left    PTT 41.5 High  seconds            12/03/24 1532  [place] Draw  Collected: 12/03/24 1526  Final result  Specimen: Blood from Arm, Left           12/03/24 1532  Green Top (Gel)  Collected: 12/03/24 1526  Final result  Specimen: Blood from Arm, Left    Extra Tube Hold for add-ons.             12/03/24 1532  Lavender Top  Collected: 12/03/24 1526  Final result  Specimen: Blood from Arm, Left    Extra Tube hold for add-on             12/03/24 1532  Gold Top - SST  Collected: 12/03/24 1526  Final result  Specimen: Blood from Arm, Left     Extra Tube Hold for add-ons.             12/03/24 1532  Light Blue Top  Collected: 12/03/24 1526  Final result  Specimen: Blood from Arm, Left    Extra Tube Hold for add-ons.             12/03/24 1529  CBC & Differential  Collected: 12/03/24 1526  Final result  Specimen: Blood from Arm, Left           12/03/24 1529  CBC Auto Differential  Collected: 12/03/24 1526  Final result  Specimen: Blood from Arm, Left    WBC 15.17 High  10*3/mm3 Lymphocyte % 5.5 Low  %   RBC 4.38 10*6/mm3 Monocyte % 4.3 Low  %   Hemoglobin 11.8 Low  g/dL Eosinophil % 0.1 Low  %   Hematocrit 38.0 % Basophil % 0.2 %   MCV 86.8 fL Immature Grans % 0.5 %   MCH 26.9 pg Neutrophils, Absolute 13.56 High  10*3/mm3   MCHC 31.1 Low  g/dL Lymphocytes, Absolute 0.84 10*3/mm3   RDW 14.6 % Monocytes, Absolute 0.65 10*3/mm3   RDW-SD 46.7 fl Eosinophils, Absolute 0.02 10*3/mm3   MPV 11.8 fL Basophils, Absolute 0.03 10*3/mm3   Platelets 285 10*3/mm3 Immature Grans, Absolute 0.07 High  10*3/mm3   Neutrophil % 89.4 High  % nRBC 0.0 /100 WBC                XR Chest 1 View  Performed: 12/03/24 1554  Final          Impression: No acute cardiopulmonary process. This report was finalized on 12/3/2024 4:00 PM by Diana Ozuna M.D..        Assessment & Plan  Assessment / Plan     Brief Patient Summary:  Tsering Alberto is a 89 y.o. female who presents to the emergency department was found to be septic with a urinary tract infection and infected sacral decubitus.  She will be admitted for further evaluation and treatment    Active Hospital Problems/plan:  Severe sepsis  Complicated urinary tract infection  Infected decubitus ulcer  Leukocytosis  -Patient will be admitted to the hospital service  - Patient has been started on the sepsis protocol  - Patient received fluid resuscitation in the emergency department we will continue to provide IV fluids in the form of normal saline at 100 cc/h  - I started the patient on IV ceftriaxone and IV vancomycin for  broad-spectrum coverage  - We are awaiting urine blood and wound cultures  - Monitor the patient's laboratory data closely  - Keep patient on telemetry for closer monitoring  - Subjectively treat the fever with Tylenol  - Consulted wound care  - With the patient potentially suffering from multiple sources of infection also consult infectious disease    ANTHONY most likely secondary to volume depletion  - We will fluid resuscitate as stated above  - We will repeat lab work in the a.m. to ensure improvement in her renal functions  -Try and avoid nephrotoxic drugs although the patient may require NSAIDs if her fever remains elevated despite Tylenol administration      VTE Prophylaxis:  Pharmacologic VTE prophylaxis orders are signed & held. Pharmacologic VTE prophylaxis orders are present.        CODE STATUS:       Admission Status:  I believe this patient meets inpatient status.    Jordy Johnson DO    Electronically signed by Jordy Johnson DO at 12/03/24 1859          Emergency Department Notes        Tahira Piper RN at 12/03/24 1807          Sepsis bolus completed at this time. Patient tolerated well. Patient current VS as follows    117/51 - 87 - 92% - 101.1 - 18    Provider made aware of completion.     Electronically signed by Tahira Piper RN at 12/03/24 1808       Franck Barnes DO at 12/03/24 1737          Subjective   History of Present Illness  89-year-old female presents from local nursing home due to concerns for chills and fever.  Nursing of staff noted concerns for a sacral decubitus ulcer.  Patient noted her primary concern at this time was pain on her buttocks and shaking.  No chest pain.  No shortness of breath.  No headache or meningismal symptoms.  Does have slight pain with urination.  No considerable abdominal pain.  No obvious aggravating or alleviating factors.  Vitals stable.      Review of Systems   Constitutional:  Positive for chills and fever.   Genitourinary:  Positive for  dysuria.   Skin:  Positive for wound.   All other systems reviewed and are negative.      Past Medical History:   Diagnosis Date    Diabetes mellitus     Hypertension        Allergies   Allergen Reactions    Penicillins Rash       Past Surgical History:   Procedure Laterality Date    CARDIAC SURGERY      CATARACT EXTRACTION      COLONOSCOPY      REPLACEMENT TOTAL KNEE         Family History   Problem Relation Age of Onset    Ovarian cancer Sister     Breast cancer Other        Social History     Socioeconomic History    Marital status: Unknown   Tobacco Use    Smoking status: Never    Smokeless tobacco: Never   Substance and Sexual Activity    Alcohol use: No           Objective   Physical Exam  Constitutional:       General: She is not in acute distress.     Appearance: Normal appearance. She is not ill-appearing.   HENT:      Head: Normocephalic and atraumatic.      Right Ear: External ear normal.      Left Ear: External ear normal.      Nose: Nose normal.      Mouth/Throat:      Mouth: Mucous membranes are moist.   Eyes:      Extraocular Movements: Extraocular movements intact.      Pupils: Pupils are equal, round, and reactive to light.   Cardiovascular:      Rate and Rhythm: Normal rate and regular rhythm.      Heart sounds: No murmur heard.  Pulmonary:      Effort: Pulmonary effort is normal. No respiratory distress.      Breath sounds: Normal breath sounds. No wheezing.   Abdominal:      General: Bowel sounds are normal.      Palpations: Abdomen is soft.      Tenderness: There is no abdominal tenderness.   Musculoskeletal:         General: No deformity or signs of injury. Normal range of motion.      Cervical back: Normal range of motion and neck supple.   Skin:     General: Skin is warm and dry.      Findings: Erythema present.             Comments: Unstageable decubitus ulcer with exudative drainage and surrounding erythema.   Neurological:      General: No focal deficit present.      Mental Status: She is  alert and oriented to person, place, and time. Mental status is at baseline.      Cranial Nerves: No cranial nerve deficit.   Psychiatric:         Mood and Affect: Mood normal.         Behavior: Behavior normal.         Thought Content: Thought content normal.         Procedures          ED Course  ED Course as of 12/03/24 1802   Tue Dec 03, 2024   1602 EKG interpretation normal sinus rhythm 97 bpm QTc is 421 incomplete right bundle branch block no significant ST elevations or depressions.  Electronically signed by Elier Ayers DO, 12/03/24, 4:02 PM EST.   [GF]      ED Course User Index  [GF] Elier Ayers DO                                        XR Chest 1 View    Result Date: 12/3/2024  No acute cardiopulmonary process.   This report was finalized on 12/3/2024 4:00 PM by Diana Ozuna M.D..         Results for orders placed or performed during the hospital encounter of 12/03/24   Comprehensive Metabolic Panel    Collection Time: 12/03/24  3:26 PM    Specimen: Arm, Left; Blood   Result Value Ref Range    Glucose 189 (H) 65 - 99 mg/dL    BUN 50 (H) 8 - 23 mg/dL    Creatinine 1.00 0.57 - 1.00 mg/dL    Sodium 140 136 - 145 mmol/L    Potassium 3.8 3.5 - 5.2 mmol/L    Chloride 100 98 - 107 mmol/L    CO2 25.2 22.0 - 29.0 mmol/L    Calcium 8.5 (L) 8.6 - 10.5 mg/dL    Total Protein 6.7 6.0 - 8.5 g/dL    Albumin 3.2 (L) 3.5 - 5.2 g/dL    ALT (SGPT) 11 1 - 33 U/L    AST (SGOT) 20 1 - 32 U/L    Alkaline Phosphatase 111 39 - 117 U/L    Total Bilirubin 0.7 0.0 - 1.2 mg/dL    Globulin 3.5 gm/dL    A/G Ratio 0.9 g/dL    BUN/Creatinine Ratio 50.0 (H) 7.0 - 25.0    Anion Gap 14.8 5.0 - 15.0 mmol/L    eGFR 54.3 (L) >60.0 mL/min/1.73   Protime-INR    Collection Time: 12/03/24  3:26 PM    Specimen: Arm, Left; Blood   Result Value Ref Range    Protime 21.2 (H) 12.1 - 14.7 Seconds    INR 1.82 (H) 0.90 - 1.10   aPTT    Collection Time: 12/03/24  3:26 PM    Specimen: Arm, Left; Blood   Result Value Ref Range    PTT 41.5  (H) 26.5 - 34.5 seconds   Lactic Acid, Plasma    Collection Time: 12/03/24  3:26 PM    Specimen: Arm, Left; Blood   Result Value Ref Range    Lactate 3.4 (C) 0.5 - 2.0 mmol/L   Procalcitonin    Collection Time: 12/03/24  3:26 PM    Specimen: Arm, Left; Blood   Result Value Ref Range    Procalcitonin 0.96 (H) 0.00 - 0.25 ng/mL   CBC Auto Differential    Collection Time: 12/03/24  3:26 PM    Specimen: Arm, Left; Blood   Result Value Ref Range    WBC 15.17 (H) 3.40 - 10.80 10*3/mm3    RBC 4.38 3.77 - 5.28 10*6/mm3    Hemoglobin 11.8 (L) 12.0 - 15.9 g/dL    Hematocrit 38.0 34.0 - 46.6 %    MCV 86.8 79.0 - 97.0 fL    MCH 26.9 26.6 - 33.0 pg    MCHC 31.1 (L) 31.5 - 35.7 g/dL    RDW 14.6 12.3 - 15.4 %    RDW-SD 46.7 37.0 - 54.0 fl    MPV 11.8 6.0 - 12.0 fL    Platelets 285 140 - 450 10*3/mm3    Neutrophil % 89.4 (H) 42.7 - 76.0 %    Lymphocyte % 5.5 (L) 19.6 - 45.3 %    Monocyte % 4.3 (L) 5.0 - 12.0 %    Eosinophil % 0.1 (L) 0.3 - 6.2 %    Basophil % 0.2 0.0 - 1.5 %    Immature Grans % 0.5 0.0 - 0.5 %    Neutrophils, Absolute 13.56 (H) 1.70 - 7.00 10*3/mm3    Lymphocytes, Absolute 0.84 0.70 - 3.10 10*3/mm3    Monocytes, Absolute 0.65 0.10 - 0.90 10*3/mm3    Eosinophils, Absolute 0.02 0.00 - 0.40 10*3/mm3    Basophils, Absolute 0.03 0.00 - 0.20 10*3/mm3    Immature Grans, Absolute 0.07 (H) 0.00 - 0.05 10*3/mm3    nRBC 0.0 0.0 - 0.2 /100 WBC   Green Top (Gel)    Collection Time: 12/03/24  3:26 PM   Result Value Ref Range    Extra Tube Hold for add-ons.    Lavender Top    Collection Time: 12/03/24  3:26 PM   Result Value Ref Range    Extra Tube hold for add-on    Gold Top - SST    Collection Time: 12/03/24  3:26 PM   Result Value Ref Range    Extra Tube Hold for add-ons.    Light Blue Top    Collection Time: 12/03/24  3:26 PM   Result Value Ref Range    Extra Tube Hold for add-ons.    Urinalysis With Microscopic If Indicated (No Culture) - Urine, Catheter    Collection Time: 12/03/24  3:38 PM    Specimen: Urine, Catheter    Result Value Ref Range    Color, UA Yellow Yellow, Straw    Appearance, UA Cloudy (A) Clear    pH, UA 7.0 5.0 - 8.0    Specific Gravity, UA 1.016 1.005 - 1.030    Glucose, UA Negative Negative    Ketones, UA Negative Negative    Bilirubin, UA Negative Negative    Blood, UA Small (1+) (A) Negative    Protein,  mg/dL (2+) (A) Negative    Leuk Esterase, UA Large (3+) (A) Negative    Nitrite, UA Negative Negative    Urobilinogen, UA 1.0 E.U./dL 0.2 - 1.0 E.U./dL   Urinalysis, Microscopic Only - Urine, Catheter    Collection Time: 12/03/24  3:38 PM    Specimen: Urine, Catheter   Result Value Ref Range    RBC, UA 3-5 (A) None Seen, 0-2 /HPF    WBC, UA Too Numerous to Count (A) None Seen, 0-2 /HPF    Bacteria, UA 4+ (A) None Seen /HPF    Squamous Epithelial Cells, UA 0-2 None Seen, 0-2 /HPF    Hyaline Casts, UA 3-6 None Seen /LPF    Methodology Automated Microscopy    [place] Urine Culture Tube - Urine, Catheter    Collection Time: 12/03/24  3:38 PM    Specimen: Urine, Catheter   Result Value Ref Range    Extra Tube Hold for add-ons.    ECG 12 Lead Other; Sepsis    Collection Time: 12/03/24  3:57 PM   Result Value Ref Range    QT Interval 332 ms    QTC Interval 421 ms                   Medical Decision Making  CBC listed.  Leukocytosis noted.  Sepsis screening positive.  Sepsis protocol initiated.  IV antibiotics and IV fluids given.  Urinalysis also notes concerns for UTI.  Chest x-ray unremarkable.  Recommend admission for further work up and treatment.  Hospitalist team consulted and made aware of the patient.  Consults and orders placed per hospitalist request.  Patient was agreeable to admission plan.  Vitals stable on admission.    Amount and/or Complexity of Data Reviewed  Labs: ordered. Decision-making details documented in ED Course.  Radiology: ordered. Decision-making details documented in ED Course.  ECG/medicine tests: ordered.    Risk  OTC drugs.  Prescription drug management.  Decision regarding  hospitalization.        Final diagnoses:   Sepsis, due to unspecified organism, unspecified whether acute organ dysfunction present   Pressure injury, unstageable, unspecified location   Acute UTI       ED Disposition  ED Disposition       ED Disposition   Decision to Admit    Condition   --    Comment   Level of Care: Telemetry [5]   Diagnosis: Sepsis [5809251]   Certification: I Certify That Inpatient Hospital Services Are Medically Necessary For Greater Than 2 Midnights                 No follow-up provider specified.       Medication List      No changes were made to your prescriptions during this visit.            Franck Barnes DO  12/03/24 1802      Electronically signed by Franck Barnes DO at 12/03/24 1802       Tahira Piper, RN at 12/03/24 1735          Patient rectal temp 101.4 Dr Barnes made aware.     Electronically signed by Tahira Piper, ASHLEY at 12/03/24 1846       Facility-Administered Medications as of 12/4/2024   Medication Dose Route Frequency Provider Last Rate Last Admin    [COMPLETED] acetaminophen (TYLENOL) tablet 650 mg  650 mg Oral Once Franck Barnes DO   650 mg at 12/03/24 1551    sennosides-docusate (PERICOLACE) 8.6-50 MG per tablet 2 tablet  2 tablet Oral BID PRN Jordy Johnson DO        And    polyethylene glycol (MIRALAX) packet 17 g  17 g Oral Daily PRN Jordy Johnson DO        And    bisacodyl (DULCOLAX) EC tablet 5 mg  5 mg Oral Daily PRN Jordy Johnson DO        And    bisacodyl (DULCOLAX) suppository 10 mg  10 mg Rectal Daily PRN Jordy Johnson DO        heparin (porcine) 5000 UNIT/ML injection 5,000 Units  5,000 Units Subcutaneous Q12H Jordy Johnson DO   5,000 Units at 12/03/24 2112    HYDROcodone-acetaminophen (NORCO) 5-325 MG per tablet 1 tablet  1 tablet Oral Q6H PRN Jordy Johnson DO        [COMPLETED] ketorolac (TORADOL) injection 15 mg  15 mg Intravenous Once Franck Barnes DO   15 mg at 12/03/24 1749    [COMPLETED] meropenem (MERREM) 1,000 mg in sodium  "chloride 0.9 % 100 mL IVPB-VTB  1,000 mg Intravenous Once Franck Barnes DO   Stopped at 12/03/24 1621    nitroglycerin (NITROSTAT) SL tablet 0.4 mg  0.4 mg Sublingual Q5 Min PRN Jordy Johnson DO        ondansetron ODT (ZOFRAN-ODT) disintegrating tablet 4 mg  4 mg Oral Q6H PRN Jordy Johnson DO        Or    ondansetron (ZOFRAN) injection 4 mg  4 mg Intravenous Q6H PRN Jordy Johnson DO        [COMPLETED] sepsis fluid NS 0.9 % bolus 1,851 mL  30 mL/kg Intravenous Once Franck Barnes DO   1,851 mL at 12/03/24 1550    [COMPLETED] sepsis fluid NS 0.9 % bolus 1,851 mL  30 mL/kg Intravenous Once Jordy Johnson DO   1,851 mL at 12/03/24 2002    [COMPLETED] sodium chloride 0.9 % bolus 500 mL  500 mL Intravenous Once Josr Garvin APRN 1,000 mL/hr at 12/03/24 2356 500 mL at 12/03/24 2356    sodium chloride 0.9 % flush 10 mL  10 mL Intravenous PRN Franck Barnes DO        sodium chloride 0.9 % flush 10 mL  10 mL Intravenous Q12H Jordy Johnson DO   10 mL at 12/03/24 2203    sodium chloride 0.9 % flush 10 mL  10 mL Intravenous PRN Jordy Johnson DO        sodium chloride 0.9 % infusion 40 mL  40 mL Intravenous PRN Jordy Johnson DO        sodium chloride 0.9 % infusion  100 mL/hr Intravenous Continuous Jordy Johnson  mL/hr at 12/04/24 0529 100 mL/hr at 12/04/24 0529    vancomycin (VANCOCIN) 1,000 mg in sodium chloride 0.9 % 250 mL IVPB-VTB  15 mg/kg Intravenous Q24H Jordy Johnson DO        [COMPLETED] vancomycin 1250 mg/250 mL 0.9% NS IVPB ([facility])  20 mg/kg Intravenous Once Jordy Johnson DO   1,250 mg at 12/03/24 2113     Orders (all)        Start     Ordered    12/04/24 2100  vancomycin (VANCOCIN) 1,000 mg in sodium chloride 0.9 % 250 mL IVPB-VTB  Every 24 Hours         12/03/24 1932    12/04/24 0800  Oral Care  2 Times Daily       12/03/24 1923    12/04/24 0736  Blood Culture - Blood,  Once        Placed in \"And\" Linked Group    12/04/24 0735    12/04/24 0736  Blood Culture - Blood,  Once      " "  Comments: From a different site than #1.     Placed in \"And\" Linked Group    12/04/24 0735    12/04/24 0734  Urinalysis With Culture If Indicated -  Once        Comments: Please ensure 'Use Existing Specimen' is selected if this order is for urine culture add-on.  If no button appears, please contact the lab for assistance.      12/04/24 0733    12/04/24 0600  CBC Auto Differential  PROCEDURE ONCE         12/03/24 2202 12/04/24 0512  No Specific Antidote Recommendations  Once         12/04/24 0511    12/04/24 0511  Extravasation Standing Orders - IMMEDIATELY STOP INFUSION  Once         12/04/24 0511    12/04/24 0511  Extravasation Standing Orders - DO NOT REMOVE CATHETER / NEEDLE  Once         12/04/24 0511    12/04/24 0511  Extravasation Standing Orders - AVOID PRESSURE  Once         12/04/24 0511    12/04/24 0511  Extravasation Standing Orders - Disconnect the IV Administration Set  Once         12/04/24 0511    12/04/24 0511  Extravasation Standing Orders - Evaluate the Site for Extravasation of Fluid (Check for Blood Return)  Once         12/04/24 0511    12/04/24 0511  Extravasation Standing Orders - Aspirate as Much of the Medication From the Needle / Cannula As Possible Using A Small (1-5mL) Syringe - Verify Recommended Treatment - If Antidote Does Not Require Infusion Through Extravasated Line Remove Needle / Cannula After Aspiration  Once         12/04/24 0511    12/04/24 0511  Extravasation Standing Orders - Danny Around the Area With A Pen  Once         12/04/24 0511    12/04/24 0511  Extravasation Standing Orders - Photograph the Area for Medical Record If Indicated Per Photo Policy  Once         12/04/24 0511    12/04/24 0511  Extravasation Standing Orders - Elevate Affected Extremity for 24-48 Hours  Continuous         12/04/24 0511    12/04/24 0511  Notify Provider Prior to Administration of Antidote - Extravasation Standing Orders  Continuous        Comments: Open Order Report to View Parameters " Requiring Provider Notification    12/04/24 0511    12/04/24 0511  Notify Provider for Tissue Sloughing, Necrosis or Blistering at Extravasation Site  Continuous        Comments: Open Order Report to View Parameters Requiring Provider Notification    12/04/24 0511    12/04/24 0511  Indicate Extravasated Medication to Determine Antidote to Initiate  Once        Comments: Select infusion extravasated to determine antidote to initiate. (Search by generic name)    12/04/24 0511    12/04/24 0328  Inpatient Infectious Diseases Consult  Once        Specialty:  Infectious Diseases  Provider:  (Not yet assigned)    12/04/24 0327    12/04/24 0200  Unstageable Pressure Ulcer (Wet) Care Q12H  Every 12 Hours        Comments: - Gently Cleanse With Normal Saline   - Pack Loosely With Moist to Moist Normal Saline Fluffed Gauze   - Cover With Silicone Border Dressing or Dry Dressing    12/04/24 0159    12/04/24 0158  Follow Pressure Ulcer Prevention Measures Policy  Continuous        Comments: Implement Appropriate Pressure Ulcer Prevention Measures  - Open Order Report to View Full Instructions  Enter Wound LDA & Document Assessment  Add Wound Care Plan  Add Patient Education Per Policy    12/04/24 0159    12/04/24 0156  Blood Culture ID, PCR - Blood, Arm, Left  Once         12/04/24 0155    12/04/24 0045  sodium chloride 0.9 % bolus 500 mL  Once         12/03/24 2352    12/03/24 2100  sodium chloride 0.9 % flush 10 mL  Every 12 Hours Scheduled         12/03/24 1923    12/03/24 2100  heparin (porcine) 5000 UNIT/ML injection 5,000 Units  Every 12 Hours Scheduled         12/03/24 1923    12/03/24 2100  vancomycin 1250 mg/250 mL 0.9% NS IVPB ([facility])  Once         12/03/24 1931    12/03/24 2015  sodium chloride 0.9 % infusion  Continuous         12/03/24 1923    12/03/24 2015  sepsis fluid NS 0.9 % bolus 1,851 mL  Once         12/03/24 1923    12/03/24 2000  Vital Signs  Every 4 Hours       12/03/24 1923    12/03/24 1944  Wound Ostomy  Eval & Treat  Once         12/03/24 1943    12/03/24 1941  Case Management  Consult  Once        Comments: Family wants to talk about medicare   Provider:  (Not yet assigned)    12/03/24 1943 12/03/24 1927  POC Glucose Once  PROCEDURE ONCE        Comments: Complete no more than 45 minutes prior to patient eating      12/03/24 1920 12/03/24 1924  Intake & Output  Every Shift       12/03/24 1923 12/03/24 1924  Weigh Patient  Once         12/03/24 1923 12/03/24 1924  Insert Peripheral IV  Once         12/03/24 1923 12/03/24 1924  Saline Lock & Maintain IV Access  Continuous,   Status:  Canceled         12/03/24 1923 12/03/24 1924  Continuous Cardiac Monitoring  Continuous        Comments: Follow Standing Orders As Outlined in Process Instructions (Open Order Report to View Full Instructions)    12/03/24 1923 12/03/24 1924  Maintain IV Access  Continuous         12/03/24 1923 12/03/24 1924  Telemetry - Place Orders & Notify Provider of Results When Patient Experiences Acute Chest Pain, Dysrhythmia or Respiratory Distress  Continuous        Comments: Open Order Report to View Parameters Requiring Provider Notification    12/03/24 1923 12/03/24 1924  May Be Off Telemetry for Tests  Continuous         12/03/24 1923 12/03/24 1924  Activity - Bed Rest With Exceptions (Specify)  Until Discontinued         12/03/24 1923 12/03/24 1924  Notify Provider (With Default Parameters)  Continuous        Comments: Open Order Report to View Parameters Requiring Provider Notification    12/03/24 1923 12/03/24 1924  Diet: Regular/House; Fluid Consistency: Thin (IDDSI 0)  Diet Effective Now         12/03/24 1923    12/03/24 1924  Wound Ostomy Eval & Treat  Once         12/03/24 1923    12/03/24 1924  Basic Metabolic Panel  Daily       12/03/24 1923    12/03/24 1924  CBC & Differential  Daily       12/03/24 1923 12/03/24 1924  Magnesium  Daily       12/03/24 1923 12/03/24 1924  CBC  "Auto Differential  PROCEDURE ONCE         12/03/24 1923    12/03/24 1923  sodium chloride 0.9 % flush 10 mL  As Needed         12/03/24 1923 12/03/24 1923  sodium chloride 0.9 % infusion 40 mL  As Needed         12/03/24 1923 12/03/24 1923  nitroglycerin (NITROSTAT) SL tablet 0.4 mg  Every 5 Minutes PRN         12/03/24 1923 12/03/24 1923  Pharmacy to dose vancomycin  Continuous PRN,   Status:  Discontinued         12/03/24 1923    12/03/24 1923  HYDROcodone-acetaminophen (NORCO) 5-325 MG per tablet 1 tablet  Every 6 Hours PRN         12/03/24 1923 12/03/24 1923  sennosides-docusate (PERICOLACE) 8.6-50 MG per tablet 2 tablet  2 Times Daily PRN        Placed in \"And\" Linked Group    12/03/24 1923    12/03/24 1923  polyethylene glycol (MIRALAX) packet 17 g  Daily PRN        Placed in \"And\" Linked Group    12/03/24 1923    12/03/24 1923  bisacodyl (DULCOLAX) EC tablet 5 mg  Daily PRN        Placed in \"And\" Linked Group    12/03/24 1923    12/03/24 1923  bisacodyl (DULCOLAX) suppository 10 mg  Daily PRN        Placed in \"And\" Linked Group    12/03/24 1923    12/03/24 1923  ondansetron ODT (ZOFRAN-ODT) disintegrating tablet 4 mg  Every 6 Hours PRN        Placed in \"Or\" Linked Group    12/03/24 1923    12/03/24 1923  ondansetron (ZOFRAN) injection 4 mg  Every 6 Hours PRN        Placed in \"Or\" Linked Group    12/03/24 1923    12/03/24 1826  STAT Lactic Acid, Reflex  PROCEDURE ONCE         12/03/24 1602    12/03/24 1754  ketorolac (TORADOL) injection 15 mg  Once         12/03/24 1738    12/03/24 1706  Inpatient Admission  Once         12/03/24 1709    12/03/24 1619  [place] Urine Culture Tube - Urine, Catheter  Once         12/03/24 1618    12/03/24 1552  Urinalysis, Microscopic Only - Urine, Catheter  Once         12/03/24 1551    12/03/24 1551  sepsis fluid NS 0.9 % bolus 1,851 mL  Once         12/03/24 1535    12/03/24 1550  acetaminophen (TYLENOL) tablet 650 mg  Once         12/03/24 1534    12/03/24 1550  " meropenem (MERREM) 1,000 mg in sodium chloride 0.9 % 100 mL IVPB-VTB  Once         12/03/24 1534    12/03/24 1534  RAS AURIS PCR - Swab, Axilla Right, Axilla Left and Groin  Once         12/03/24 1533    12/03/24 1528  Wound Culture - Wound, Coccyx  Once         12/03/24 1528    12/03/24 1512  Undress & Gown  Once         12/03/24 1511    12/03/24 1512  Cardiac Monitoring  Continuous,   Status:  Canceled        Comments: Follow Standing Orders As Outlined in Process Instructions (Open Order Report to View Full Instructions)    12/03/24 1511    12/03/24 1512  Continuous Pulse Oximetry  Continuous         12/03/24 1511    12/03/24 1512  Measure Blood Pressure  Every 30 Minutes        Comments: Increase Frequency as Patient Condition Dictates and/or With Use of Vasoactive Treatments    12/03/24 1511    12/03/24 1512  Vital Signs  Every 30 Minutes        Comments: Increase Frequency as Patient Condition Dictates    12/03/24 1511    12/03/24 1512  ECG 12 Lead Other; Sepsis  Once         12/03/24 1511    12/03/24 1512  XR Chest 1 View  1 Time Imaging         12/03/24 1511    12/03/24 1512  Insert Peripheral IV  Once         12/03/24 1511    12/03/24 1512  Insert 2nd Large Bore Peripheral IV  Once         12/03/24 1511    12/03/24 1512  [place] Draw  Once         12/03/24 1511    12/03/24 1512  CBC & Differential  Once         12/03/24 1511    12/03/24 1512  Comprehensive Metabolic Panel  Once         12/03/24 1511    12/03/24 1512  Protime-INR  Once         12/03/24 1511    12/03/24 1512  aPTT  Once         12/03/24 1511    12/03/24 1512  Lactic Acid, Plasma  Once         12/03/24 1511    12/03/24 1512  Procalcitonin  Once         12/03/24 1511    12/03/24 1512  Blood Culture - Blood, Arm, Left  Once         12/03/24 1511    12/03/24 1512  Blood Culture - Blood, Arm, Right  Once         12/03/24 1511    12/03/24 1512  Urinalysis With Microscopic If Indicated (No Culture) - Urine, Catheter  Once         12/03/24 4380     12/03/24 1512  Green Top (Gel)  PROCEDURE ONCE         12/03/24 1511    12/03/24 1512  Lavender Top  PROCEDURE ONCE         12/03/24 1511    12/03/24 1512  Gold Top - SST  PROCEDURE ONCE         12/03/24 1511    12/03/24 1512  Light Blue Top  PROCEDURE ONCE         12/03/24 1511    12/03/24 1512  CBC Auto Differential  PROCEDURE ONCE         12/03/24 1511    12/03/24 1511  sodium chloride 0.9 % flush 10 mL  As Needed         12/03/24 1511    12/03/24 0000  Telemetry Scan  Once         12/03/24 0000    Unscheduled  Oxygen Therapy- Nasal Cannula; Titrate 1-6 LPM Per SpO2; 90 - 95%  Continuous PRN       12/03/24 1511    Unscheduled  Oxygen Therapy- Nasal Cannula; Titrate 1-6 LPM Per SpO2; 90 - 95%  Continuous PRN       12/03/24 1923    Unscheduled  Unstageable Pressure Ulcer (Wet) Care PRN  As Needed      Comments: - Gently Cleanse With Normal Saline   - Pack Loosely With Moist to Moist Normal Saline Fluffed Gauze   - Cover With Silicone Border Dressing or Dry Dressing    12/04/24 0159    Unscheduled  Extravasation Standing Orders - Encourage Active Range of Motion After 48 Hours  As Needed       12/04/24 0511    --  erythromycin (ROMYCIN) 5 MG/GM ophthalmic ointment  Nightly         12/03/24 1757    --  SITagliptin (JANUVIA) 100 MG tablet  Daily         12/03/24 1757    --  collagenase 250 UNIT/GM ointment  Nightly         12/03/24 1757    --  apixaban (ELIQUIS) 2.5 MG tablet tablet  2 Times Daily         12/03/24 1757    --  metoprolol tartrate (LOPRESSOR) 25 MG tablet  2 Times Daily         12/03/24 1757    --  Dextran 70-Hypromellose 0.1-0.3 % solution  2 Times Daily         12/03/24 1757    --  senna 8.6 MG tablet  2 Times Daily         12/03/24 1757    --  ascorbic acid (VITAMIN C) 500 MG tablet  2 Times Daily         12/03/24 1757    --  zinc sulfate (ZINCATE) 220 (50 Zn) MG capsule  2 Times Daily         12/03/24 1757    --  Mouthwashes (Biotene dry mouth) liquid liquid  Every 12 Hours PRN         12/03/24  1757    --  White Petrolatum-Mineral Oil (Systane Nighttime) ointment ophthalmic ointment  Every 12 Hours PRN         12/03/24 1757    --  ondansetron (ZOFRAN) 4 MG tablet  Every 6 Hours PRN         12/03/24 1757    Pending  amLODIPine (NORVASC) tablet 5 mg  Daily         Pending    Pending  ascorbic acid (VITAMIN C) tablet 500 mg  2 Times Daily         Pending    Pending  collagenase ointment 1 Application  Nightly         Pending    Pending  carboxymethylcellulose (REFRESH PLUS) 0.5 % ophthalmic solution 2 drop  3 times daily         Pending    Pending  erythromycin (ROMYCIN) ophthalmic ointment 1 Application  Nightly         Pending    Pending  metoprolol tartrate (LOPRESSOR) tablet 25 mg  2 Times Daily         Pending    Pending  sertraline (ZOLOFT) tablet 100 mg  Daily         Pending    Pending  linagliptin (TRADJENTA) tablet 5 mg  Daily         Pending    Pending  artificial tears ophthalmic ointment 1 Application  Every 12 Hours PRN         Pending    Pending  zinc sulfate (ZINCATE) capsule 220 mg  2 Times Daily         Pending                  Physician Progress Notes (all)    No notes of this type exist for this encounter.       Consult Notes (all)    No notes of this type exist for this encounter.

## 2024-12-04 NOTE — CONSULTS
INFECTIOUS DISEASE CONSULTATION REPORT        Patient Identification:  Name:  Tsering Alberto  Age:  89 y.o.  Sex:  female  :  1935  MRN:  [id number]   Visit Number:  [id number]  Primary Care Physician:  Nicolette Colbert MD        Referring Provider: Dr. Johnson    Reason for consult: GBS bacteremia       LOS: 1 day        Subjective       Subjective     History of present illness:      Thank you Dr. Johnson for allowing us to participate in the care of your patient.  As you well know, Ms. Tsering Alberto is a 89 y.o. female with past medical history significant for diabetes mellitus and hypertension, who presented to [facility] Emergency Department on 12/3/2024 for fever of 101, from local nursing home.  The patient also had some lower abdominal pain and has been dealing with sacral decubitus ulcer that is very slow to heal.  In the ED her temperature was 102 °F and heart rate remained above 90.  Was found to have 2 possible sources of infection including urinary tract infection as well as the sacral decubitus ulcer that appeared to have a cellulitic border and be draining purulent material.      In the ED lactic acid was elevated at 3.4 as well as procalcitonin at 0.96.  Leukocytosis at 15.17.  Urinalysis with 3+ leukocytes, TNTC WBC, 4+ bacteria but nitrite negative.  Culture pending.  Blood cultures x 2 positive with BC ID reporting group B strep.  The patient was noted to have multiple different bacteria within the urine over the last year and a half including Klebsiella, E. coli, Enterobacter.  Chest x-ray was unremarkable.    The patient was awake and alert this morning, oriented to person and place.  Reported she had difficulty remembering, poor historian.  On 1.5 L nasal cannula with no apparent distress.  Tmax of 102 °F, has since been afebrile.  Reported some diarrhea at the nursing home recently, 1 BM charted overnight.  Lung exam is diminished bilaterally to  auscultation.  Loud murmur was noted.  Abdomen soft and nontender with hypoactive bowel sounds.  Sacral wound photos examined.  Currently dressed, CDI.  Leukocytosis slightly improved this morning at 13.27.  Blood cultures pending x 2.  Urine culture pending.      Infectious Disease consultation was requested for antimicrobial management.      ---------------------------------------------------------------------------------------------------------------------     Review Of Systems:    ALEX, baseline dementia  ---------------------------------------------------------------------------------------------------------------------     Past Medical History    Past Medical History:   Diagnosis Date    Diabetes mellitus     Hypertension        Past Surgical History    Past Surgical History:   Procedure Laterality Date    CARDIAC SURGERY      CATARACT EXTRACTION      COLONOSCOPY      REPLACEMENT TOTAL KNEE         Family History    Family History   Problem Relation Age of Onset    Ovarian cancer Sister     Breast cancer Other        Social History    Social History     Tobacco Use    Smoking status: Never    Smokeless tobacco: Never   Vaping Use    Vaping status: Never Used   Substance Use Topics    Alcohol use: No    Drug use: Never       Allergies    Penicillins  ---------------------------------------------------------------------------------------------------------------------     Home Medications:    Prior to Admission Medications       Prescriptions Last Dose Informant Patient Reported? Taking?    amLODIPine (NORVASC) 5 MG tablet 12/3/2024 Nursing Home Yes Yes    Take 1 tablet by mouth Daily.    apixaban (ELIQUIS) 2.5 MG tablet tablet 12/3/2024 Nursing Home Yes Yes    Take 1 tablet by mouth 2 (Two) Times a Day.    ascorbic acid (VITAMIN C) 500 MG tablet 12/3/2024 Nursing Home Yes Yes    Take 1 tablet by mouth 2 (Two) Times a Day.    collagenase 250 UNIT/GM ointment 12/2/2024 [facility] Yes Yes    Apply 1 Application  topically to the appropriate area as directed Every Night.    Dextran 70-Hypromellose 0.1-0.3 % solution 12/3/2024 Nursing Home Yes Yes    Apply 2 drops to eye(s) as directed by provider 2 (Two) Times a Day.    erythromycin (ROMYCIN) 5 MG/GM ophthalmic ointment 12/2/2024 Nursing Home Yes Yes    Administer 1 Application to both eyes Every Night.    metoprolol tartrate (LOPRESSOR) 25 MG tablet 12/3/2024 Nursing Home Yes Yes    Take 1 tablet by mouth 2 (Two) Times a Day.    senna 8.6 MG tablet 12/3/2024 Nursing Home Yes Yes    Take 2 tablets by mouth 2 (Two) Times a Day.    sertraline (ZOLOFT) 100 MG tablet 12/3/2024 Nursing Home Yes Yes    Take 1 tablet by mouth Daily.    SITagliptin (JANUVIA) 100 MG tablet 12/3/2024 Nursing Home Yes Yes    Take 1 tablet by mouth Daily.    zinc sulfate (ZINCATE) 220 (50 Zn) MG capsule 12/3/2024 [facility] Yes Yes    Take 1 capsule by mouth 2 (Two) Times a Day.    Mouthwashes (Biotene dry mouth) liquid liquid Unknown Nursing Home Yes No    Take 15 mL by mouth Every 12 (Twelve) Hours As Needed for Dry Mouth.    ondansetron (ZOFRAN) 4 MG tablet Unknown Nursing Home Yes No    Take 1 tablet by mouth Every 6 (Six) Hours As Needed for Nausea or Vomiting.    White Petrolatum-Mineral Oil (Systane Nighttime) ointment ophthalmic ointment Unknown Nursing Home Yes No    Administer 1 Application to both eyes Every 12 (Twelve) Hours As Needed (dry eyes).          ---------------------------------------------------------------------------------------------------------------------    Objective       Objective     Hospital Scheduled Meds:  amLODIPine, 5 mg, Oral, Daily  ascorbic acid, 500 mg, Oral, BID  carboxymethylcellulose, 2 drop, Both Eyes, TID  cefTRIAXone, 2,000 mg, Intravenous, Q24H  collagenase, 1 Application, Topical, Nightly  erythromycin, 1 Application, Both Eyes, Nightly  heparin (porcine), 5,000 Units, Subcutaneous, Q12H  linagliptin, 5 mg, Oral, Daily  metoprolol tartrate, 25 mg, Oral,  BID  sertraline, 100 mg, Oral, Daily  sodium chloride, 10 mL, Intravenous, Q12H  vancomycin, 15 mg/kg, Intravenous, Q24H  zinc sulfate, 220 mg, Oral, BID      sodium chloride, 100 mL/hr, Last Rate: 100 mL/hr (12/04/24 0529)      ---------------------------------------------------------------------------------------------------------------------   Vital Signs:  Temp:  [97.4 °F (36.3 °C)-102 °F (38.9 °C)] 97.4 °F (36.3 °C)  Heart Rate:  [67-99] 92  Resp:  [14-19] 19  BP: ()/(38-98) 116/84  Mean Arterial Pressure (Non-Invasive) for the past 24 hrs (Last 3 readings):   Noninvasive MAP (mmHg)   12/04/24 0354 72   12/04/24 0012 68   12/03/24 2300 59     SpO2 Percentage    12/04/24 0354 12/04/24 0758 12/04/24 1109   SpO2: 94% 97% 96%     SpO2:  [91 %-97 %] 96 %  on  Flow (L/min) (Oxygen Therapy):  [1.5] 1.5;   Device (Oxygen Therapy): nasal cannula    Body mass index is 20.83 kg/m².  Wt Readings from Last 3 Encounters:   12/03/24 65.9 kg (145 lb 3.2 oz)   11/09/20 94.8 kg (209 lb)   10/05/18 91.6 kg (202 lb)     ---------------------------------------------------------------------------------------------------------------------     Physical Exam:    Constitutional: Elderly, chronically ill and frail appearing female.  Resting comfortably in bed on 1.5 L nasal cannula with no apparent distress.  Baseline of dementia, oriented to person and place.  HENT:  Head: Normocephalic and atraumatic.  Mouth:  Moist mucous membranes.    Eyes:  Conjunctivae and EOM are normal.  No scleral icterus.  Neck:  Neck supple.  No JVD present.    Cardiovascular:  Normal rate, regular rhythm with 4/6 murmur pulmonary/Chest: Diminished bilaterally to auscultation.  No respiratory distress, no wheezes, no crackles, with normal breath sounds and good air movement.  Abdominal:  Soft.  Bowel sounds are normal.  No distension and no tenderness.   Musculoskeletal:  No edema, no tenderness, and no deformity.  No swelling or redness of  "joints.  Neurological:  Alert and oriented to person, place  Skin:  Skin is warm and dry.  No rash noted.  No pallor.  Large sacral ulcer, with photos reviewed.  Eschar and sloughing noted to the wound base.  Surrounding cellulitis.        ---------------------------------------------------------------------------------------------------------------------              Results from last 7 days   Lab Units 12/04/24  0853 12/04/24  0043 12/03/24  1857 12/03/24  1526   LACTATE mmol/L  --   --  1.6 3.4*   WBC 10*3/mm3 13.27* 19.82*  --  15.17*   HEMOGLOBIN g/dL 10.8* 9.2*  --  11.8*   HEMATOCRIT % 35.6 30.8*  --  38.0   MCV fL 89.9 90.1  --  86.8   MCHC g/dL 30.3* 29.9*  --  31.1*   PLATELETS 10*3/mm3 224 222  --  285   INR   --   --   --  1.82*     Results from last 7 days   Lab Units 12/04/24  0853 12/03/24  1526   SODIUM mmol/L 142 140   POTASSIUM mmol/L 3.2* 3.8   MAGNESIUM mg/dL 1.3*  --    CHLORIDE mmol/L 110* 100   CO2 mmol/L 20.6* 25.2   BUN mg/dL 53* 50*   CREATININE mg/dL 1.01* 1.00   CALCIUM mg/dL 7.6* 8.5*   GLUCOSE mg/dL 125* 189*   ALBUMIN g/dL  --  3.2*   BILIRUBIN mg/dL  --  0.7   ALK PHOS U/L  --  111   AST (SGOT) U/L  --  20   ALT (SGPT) U/L  --  11   Estimated Creatinine Clearance: 40.1 mL/min (A) (by C-G formula based on SCr of 1.01 mg/dL (H)).  No results found for: \"AMMONIA\"    Glucose   Date/Time Value Ref Range Status   12/03/2024 1920 212 (H) 70 - 130 mg/dL Final     No results found for: \"HGBA1C\"  No results found for: \"TSH\", \"FREET4\"    Blood Culture   Date Value Ref Range Status   12/03/2024 Abnormal Stain (C)  Preliminary   12/03/2024 Abnormal Stain (C)  Preliminary     No results found for: \"URINECX\"  No results found for: \"WOUNDCX\"  No results found for: \"STOOLCX\"  No results found for: \"RESPCX\"  Pain Management Panel           No data to display              I have personally reviewed the above laboratory results. "   ---------------------------------------------------------------------------------------------------------------------  Imaging Results (Last 7 Days)       Procedure Component Value Units Date/Time    XR Chest 1 View [[id number]] Collected: 12/03/24 1600     Updated: 12/03/24 1602    Narrative:      PROCEDURE: XR CHEST 1 VW-       HISTORY: Severe Sepsis Protocol     COMPARISON: None.     FINDINGS: The heart is normal in size. Note is made of prior mitral  valve repair. The mediastinum is unremarkable. The lungs are clear.  There is no pneumothorax. There are no acute osseous abnormalities.       Impression:      No acute cardiopulmonary process.        This report was finalized on 12/3/2024 4:00 PM by Diana Ozuna M.D..             I have personally reviewed the above radiology results.   ---------------------------------------------------------------------------------------------------------------------      Pertinent Infectious Disease Results          Assessment & Plan      Assessment      Group B strep bacteremia  Sacral wound, rule out osteomyelitis  UTI        Plan      Ms. Tsering Alberto is a 89 y.o. female with past medical history significant for diabetes mellitus and hypertension, who presented to [facility] Emergency Department on 12/3/2024 for fever of 101, from local nursing home.  The patient also had some lower abdominal pain and has been dealing with sacral decubitus ulcer that is very slow to heal.  In the ED her temperature was 102 °F and heart rate remained above 90.  Was found to have 2 possible sources of infection including urinary tract infection as well as the sacral decubitus ulcer that appeared to have a cellulitic border and be draining purulent material.      In the ED lactic acid was elevated at 3.4 as well as procalcitonin at 0.96.  Leukocytosis at 15.17.  Urinalysis with 3+ leukocytes, TNTC WBC, 4+ bacteria but nitrite negative.  Culture pending.  Blood cultures x 2  positive with BC ID reporting group B strep.  The patient was noted to have multiple different bacteria within the urine over the last year and a half including Klebsiella, E. coli, Enterobacter.  Chest x-ray was unremarkable.    The patient was awake and alert this morning, oriented to person and place.  Reported she had difficulty remembering, poor historian.  On 1.5 L nasal cannula with no apparent distress.  Tmax of 102 °F, has since been afebrile.  Reported some diarrhea at the nursing home recently, 1 BM charted overnight.  Lung exam is diminished bilaterally to auscultation.  Loud murmur was noted.  Abdomen soft and nontender with hypoactive bowel sounds.  Sacral wound photos examined.  Currently dressed, CDI.  Leukocytosis slightly improved this morning at 13.27.  Blood cultures pending x 2.  Urine culture pending.  Coccyx wound culture preliminarily with gram-positive cocci in pairs, gram-negative bacilli, gram-positive bacilli, gram-positive cocci in clusters.    In the setting of group B strep bacteremia, recommend echocardiogram to rule out valvular involvement, as bacteremia was present on admission.  Recommend MRI to rule out osteomyelitis of sacral ulcer, possible source for bacteremia.    The patient was initiated on vancomycin, pharmacy to dose, per primary team.  We are agreeable with continuing vancomycin while awaiting cultures to finalize with susceptibility reports.  Ceftriaxone 2 g IV 24 hours was initiated in the setting of GBS bacteremia, UTI, sacral wound infection.  We will continue to monitor closely and adjust antibiotic regimen as appropriate.        ANTIMICROBIAL THERAPY    cefTRIAXone (ROCEPHIN) 2000 mg IVPB in 100 mL NS (VTB)  vancomycin (VANCOCIN) 1000mg in NS 250ml (CD)       Again, thank you Dr. Johnson for allowing us to participate in the care of your patient and please feel free to call for any questions you may have.        Code Status:     Code Status and Medical  Interventions: CPR (Attempt to Resuscitate); Full Support   Ordered at: 12/04/24 0840     Code Status (Patient has no pulse and is not breathing):    CPR (Attempt to Resuscitate)     Medical Interventions (Patient has pulse or is breathing):    Full Support         Lina Leon, ROBBI  12/04/24  11:11 EST

## 2024-12-04 NOTE — PROGRESS NOTES
Pharmacokinetics Service Note:    Tsering Alberto is a 89 y.o. female being evaluated by Pharmacy for vancomycin dosing.    Indication for Therapy: SSTI  Currently Estimated Renal Function: ClCr 40 mL/min using serum Cr of 1 mg/dL  Proposed Empiric Regimen Using Predicted Pharmacokinetic Parameters and Demographics: 1250 mg loading dose followed by 1000 mg IV q24h  Duration of Therapy Ordered: 5 days  Target Steady State AUC Range: 400-600 mg/L*hr  PAUC: >80%    Monitoring Planned: Will follow up with a trough before the 3rd dose.    Thanks for this consult,  Cadence Leon, AlessandroD

## 2024-12-04 NOTE — PLAN OF CARE
Goal Outcome Evaluation:   Patient was admitted this shift. Patient is pleasant, A&Ox4. Patient has been very lethargic. Patient is on 1.5L N/C. Patient had low blood pressure reading, provider notified, new orders were placed. Patient is resting in bed, call light in reach.

## 2024-12-04 NOTE — PROGRESS NOTES
Patient Identification:  Name:  Tsering Alberto  Age:  89 y.o.  Sex:  female  :  1935  MRN:  [id number]  Visit Number:  [id number]  Primary Care Provider:  Nicolette Colbert MD    Length of stay:  1    Subjective/Interval History/Consultants/Procedures     Chief Complaint:   Chief Complaint   Patient presents with    Wound Check       Subjective/Interval History:    89 y.o. female who was admitted on 12/3/2024 with UTI, sacral wound    PMH is significant for hypertension, diabetes mellitus, HFpEF, CAD, Hx mitral valve replacement, dementia  For complete admission information, please see history and physical.     Consultations:  Infectious disease  CM/SW    Procedures/Scans:  CXR    Today, the patient was seen and examined, sitting up in bed in no distress. She reported she felt well. On further questioning she did endorse some nausea without vomiting. She states she lacks an appetite and has not been eating well. She denies any abdominal pain but does report diarrhea as well. She does continue to complain of some dysuria. Case was briefly discussed with infectious disease.      Room location at the time of evaluation was [facility].    ----------------------------------------------------------------------------------------------------------------------  [facility] Meds:  amLODIPine, 5 mg, Oral, Daily  ascorbic acid, 500 mg, Oral, BID  carboxymethylcellulose, 2 drop, Both Eyes, TID  collagenase, 1 Application, Topical, Nightly  erythromycin, 1 Application, Both Eyes, Nightly  heparin (porcine), 5,000 Units, Subcutaneous, Q12H  linagliptin, 5 mg, Oral, Daily  metoprolol tartrate, 25 mg, Oral, BID  sertraline, 100 mg, Oral, Daily  sodium chloride, 10 mL, Intravenous, Q12H  vancomycin, 15 mg/kg, Intravenous, Q24H  zinc sulfate, 220 mg, Oral, BID      sodium chloride, 100 mL/hr, Last Rate: 100 mL/hr (12/04/24  0529)      ----------------------------------------------------------------------------------------------------------------------      Objective     Vital Signs:  Temp:  [97.4 °F (36.3 °C)-102 °F (38.9 °C)] 97.6 °F (36.4 °C)  Heart Rate:  [67-99] 81  Resp:  [14-18] 18  BP: ()/(38-98) 118/68      12/03/24  1528 12/03/24  1900   Weight: 61.7 kg (136 lb) 65.9 kg (145 lb 3.2 oz)     Body mass index is 20.83 kg/m².    Intake/Output Summary (Last 24 hours) at 12/4/2024 1007  Last data filed at 12/4/2024 0900  Gross per 24 hour   Intake 220 ml   Output 450 ml   Net -230 ml     I/O this shift:  In: 220 [P.O.:220]  Out: -   Diet: Regular/House; Fluid Consistency: Thin (IDDSI 0)  ----------------------------------------------------------------------------------------------------------------------    Physical Exam  Vitals and nursing note reviewed.   Constitutional:       General: She is not in acute distress.     Appearance: She is ill-appearing (chronically ill appearing).   HENT:      Head: Normocephalic and atraumatic.   Eyes:      Extraocular Movements: Extraocular movements intact.   Pulmonary:      Effort: Pulmonary effort is normal. No respiratory distress.   Abdominal:      Palpations: Abdomen is soft.   Skin:     General: Skin is warm and dry.   Neurological:      Mental Status: She is alert. Mental status is at baseline.   Psychiatric:         Mood and Affect: Mood normal.         Behavior: Behavior normal.                ----------------------------------------------------------------------------------------------------------------------  Tele:      ----------------------------------------------------------------------------------------------------------------------      Results from last 7 days   Lab Units 12/04/24  0853 12/04/24  0043 12/03/24  1857 12/03/24  1526   LACTATE mmol/L  --   --  1.6 3.4*   WBC 10*3/mm3 13.27* 19.82*  --  15.17*   HEMOGLOBIN g/dL 10.8* 9.2*  --  11.8*   HEMATOCRIT % 35.6 30.8*   "--  38.0   MCV fL 89.9 90.1  --  86.8   MCHC g/dL 30.3* 29.9*  --  31.1*   PLATELETS 10*3/mm3 224 222  --  285   INR   --   --   --  1.82*         Results from last 7 days   Lab Units 12/04/24  0853 12/03/24  1526   SODIUM mmol/L 142 140   POTASSIUM mmol/L 3.2* 3.8   MAGNESIUM mg/dL 1.3*  --    CHLORIDE mmol/L 110* 100   CO2 mmol/L 20.6* 25.2   BUN mg/dL 53* 50*   CREATININE mg/dL 1.01* 1.00   CALCIUM mg/dL 7.6* 8.5*   GLUCOSE mg/dL 125* 189*   ALBUMIN g/dL  --  3.2*   BILIRUBIN mg/dL  --  0.7   ALK PHOS U/L  --  111   AST (SGOT) U/L  --  20   ALT (SGPT) U/L  --  11   Estimated Creatinine Clearance: 40.1 mL/min (A) (by C-G formula based on SCr of 1.01 mg/dL (H)).  No results found for: \"AMMONIA\"      Blood Culture   Date Value Ref Range Status   12/03/2024 Abnormal Stain (C)  Preliminary   12/03/2024 Abnormal Stain (C)  Preliminary     No results found for: \"URINECX\"  No results found for: \"WOUNDCX\"  No results found for: \"STOOLCX\"  ----------------------------------------------------------------------------------------------------------------------  Imaging Results (Last 24 Hours)       Procedure Component Value Units Date/Time    XR Chest 1 View [[id number]] Collected: 12/03/24 1600     Updated: 12/03/24 1602    Narrative:      PROCEDURE: XR CHEST 1 VW-       HISTORY: Severe Sepsis Protocol     COMPARISON: None.     FINDINGS: The heart is normal in size. Note is made of prior mitral  valve repair. The mediastinum is unremarkable. The lungs are clear.  There is no pneumothorax. There are no acute osseous abnormalities.       Impression:      No acute cardiopulmonary process.        This report was finalized on 12/3/2024 4:00 PM by Diana Ozuna M.D..             ----------------------------------------------------------------------------------------------------------------------   I have reviewed the above laboratory values for 12/04/24    Assessment/Plan     Active Hospital Problems    Diagnosis  POA    " **Sepsis [A41.9]  Yes         ASSESSMENT/PLAN:    Severe sepsis, POA  Group B strep bacteremia  Acute urinary tract infection  Infected decubitus ulcer of coccyx  Patient was sent to the ED from local SNF secondary to fever and worsening appearance of sacral decubitus ulcer.  On workup in the ED she did meet severe sepsis criteria with tachycardia, leukocytosis, fever up to 102 and lactate greater than 3.  Pro-Merritt was 0.96.  She was given Merrem and vancomycin in the ED and started on maintenance normal saline at 100 mL/h  Given history of drug-resistant organisms we have consulted infectious disease for further assistance and recommendations, appreciated.  Urine culture remains in process.  Blood cultures 2 of 2 positive for group B strep this morning.  Repeats ordered per ID this AM.  Wound culture of coccyx is preliminary but growing multiple organisms   Will continue supportive care measures and close monitoring.  Trend labs    Hypomagnesemia  Hypokalemia   Monitor and replace electrolytes as needed per protocol  -----------  -DVT prophylaxis: H  -Disposition plans/anticipated needs: Pending course. Anticipate return to SNF once clinically improving and Abx plan in place        The patient is high risk due to the following diagnoses/reasons:  Severe sepsis, infected decubitus ulcer, UTI, bacteremia         José Luis Beckett PA-C  12/04/24  10:07 EST

## 2024-12-04 NOTE — PLAN OF CARE
Goal Outcome Evaluation:  Plan of Care Reviewed With: patient, child   Pt resting in bed, watching television. Pt has tolerated all interventions. No complaints/concerns. No acute distress noted. Call light within reach. Plan of care ongoing.

## 2024-12-04 NOTE — CONSULTS
Patient Name:  Tsering Alberto  YOB: 1935  [id number]       Patient Care Team:  Nicolette Colbert MD as PCP - General (Internal Medicine)      General Surgery Consult Note     Date of Consultation: 12/04/24    Consulting Physician : Jordy Johnson DO    Reason for Consult : sacral decubitus wound     Subjective     I have been asked to see  Tsering Alberto , a 89 y.o. female in consultation for a large sacral decubitus wound. She has been essentially bed bound for the past 2 weeks and has noted significant progression in her wound.  On presentation, she was found to meet sepsis criteria.  Her sacral decubitus wound had become unstageable and wound care recommended a surgical consultation.      Allergy:   Allergies   Allergen Reactions    Penicillins Rash       Medications:  amLODIPine, 5 mg, Oral, Daily  ascorbic acid, 500 mg, Oral, BID  carboxymethylcellulose, 2 drop, Both Eyes, TID  cefTRIAXone, 2,000 mg, Intravenous, Q24H  collagenase, 1 Application, Topical, Nightly  erythromycin, 1 Application, Both Eyes, Nightly  heparin (porcine), 5,000 Units, Subcutaneous, Q12H  linagliptin, 5 mg, Oral, Daily  magnesium sulfate, 2 g, Intravenous, Q2H  [Held by provider] metoprolol tartrate, 25 mg, Oral, BID  potassium chloride ER, 40 mEq, Oral, Q4H  sertraline, 100 mg, Oral, Daily  sodium chloride, 10 mL, Intravenous, Q12H  sodium hypochlorite, , Topical, BID  vancomycin, 15 mg/kg, Intravenous, Q24H  zinc sulfate, 220 mg, Oral, BID      sodium chloride, 100 mL/hr, Last Rate: 100 mL/hr (12/04/24 1451)      No current facility-administered medications on file prior to encounter.     Current Outpatient Medications on File Prior to Encounter   Medication Sig    amLODIPine (NORVASC) 5 MG tablet Take 1 tablet by mouth Daily.    apixaban (ELIQUIS) 2.5 MG tablet tablet Take 1 tablet by mouth 2 (Two) Times a Day.    ascorbic acid (VITAMIN C) 500 MG tablet Take 1 tablet by mouth 2 (Two) Times a Day.    collagenase  250 UNIT/GM ointment Apply 1 Application topically to the appropriate area as directed Every Night.    Dextran 70-Hypromellose 0.1-0.3 % solution Apply 2 drops to eye(s) as directed by provider 2 (Two) Times a Day.    erythromycin (ROMYCIN) 5 MG/GM ophthalmic ointment Administer 1 Application to both eyes Every Night.    metoprolol tartrate (LOPRESSOR) 25 MG tablet Take 1 tablet by mouth 2 (Two) Times a Day.    senna 8.6 MG tablet Take 2 tablets by mouth 2 (Two) Times a Day.    sertraline (ZOLOFT) 100 MG tablet Take 1 tablet by mouth Daily.    SITagliptin (JANUVIA) 100 MG tablet Take 1 tablet by mouth Daily.    zinc sulfate (ZINCATE) 220 (50 Zn) MG capsule Take 1 capsule by mouth 2 (Two) Times a Day.    Mouthwashes (Biotene dry mouth) liquid liquid Take 15 mL by mouth Every 12 (Twelve) Hours As Needed for Dry Mouth.    ondansetron (ZOFRAN) 4 MG tablet Take 1 tablet by mouth Every 6 (Six) Hours As Needed for Nausea or Vomiting.    White Petrolatum-Mineral Oil (Systane Nighttime) ointment ophthalmic ointment Administer 1 Application to both eyes Every 12 (Twelve) Hours As Needed (dry eyes).       PMHx:   Past Medical History:   Diagnosis Date    Diabetes mellitus     Hypertension        Past Surgical History:  Noncontributory    Family History: Noncontributory     Social History:  Noncontributory     Review of Systems   Pertinent items are noted in HPI     Constitutional: No fevers, chills or malaise   Eyes: Denies visual changes    Cardiovascular: Denies chest pain, palpitations   Pulmonary: Denies cough or shortness of breath   Abdominal/ GI: Nontender    Genitourinary: Denies dysuria or hematuria   Musculoskeletal: Denies any but chronic joint aches, pains or deformities   Psychiatric: No recent mood changes   Neurologic: No paresthesias or loss of function          Objective     Physical Exam:      Vital Signs  /84 (BP Location: Left arm, Patient Position: Lying)   Pulse 92   Temp 97.4 °F (36.3 °C) (Oral)   " Resp 19   Ht 177.8 cm (70\")   Wt 65.9 kg (145 lb 3.2 oz)   SpO2 96%   BMI 20.83 kg/m²     Intake/Output Summary (Last 24 hours) at 12/4/2024 1539  Last data filed at 12/4/2024 1300  Gross per 24 hour   Intake 460 ml   Output 450 ml   Net 10 ml         Physical Exam:    Head: Normocephalic, atraumatic.   Eyes: Pupils equal, round, react to light   Mouth: No lesions noted  CV: Regular rate and rhythm   Lungs: Bilateral chest rise and fall, no use of accessory muscles   Abdomen: Soft, nontender, nondistended  Extremities:  No cyanosis, clubbing or edema bilaterally   Neurologic: No gross deficits      Assessment and Plan:    Problem List Items Addressed This Visit          Musculoskeletal and Injuries    Pressure injury, unstageable    Relevant Orders    Case request (Completed)       Other    * (Principal) Sepsis - Primary    Relevant Medications    ketorolac (TORADOL) injection 15 mg (Completed)     Other Visit Diagnoses       Acute UTI        Relevant Medications    cefTRIAXone (ROCEPHIN) 2,000 mg in sodium chloride 0.9 % 100 mL IVPB-VTB             Active Hospital Problems    Diagnosis  POA    **Sepsis [A41.9]  Yes    Pressure injury, unstageable [L89.95]  Unknown      Resolved Hospital Problems   No resolved problems to display.     Ms. Alberto is an 89-year-old female with a unstageable sacral decubitus wound.  She will benefit from debridement in the operating room.  This was discussed with family member at bedside.  Will plan to proceed to the operating room tomorrow.  I have made her n.p.o. at midnight.    I discussed the patient's findings and my recommendations with the patient and/or family, as well as the primary team     Lizeth Leiva MD  12/04/24  15:39 EST        "

## 2024-12-05 ENCOUNTER — ANESTHESIA EVENT (OUTPATIENT)
Dept: PERIOP | Facility: HOSPITAL | Age: 89
End: 2024-12-05
Payer: MEDICARE

## 2024-12-05 ENCOUNTER — APPOINTMENT (OUTPATIENT)
Dept: MRI IMAGING | Facility: HOSPITAL | Age: 89
DRG: 853 | End: 2024-12-05
Payer: MEDICARE

## 2024-12-05 ENCOUNTER — ANESTHESIA (OUTPATIENT)
Dept: PERIOP | Facility: HOSPITAL | Age: 89
End: 2024-12-05
Payer: MEDICARE

## 2024-12-05 LAB
ANION GAP SERPL CALCULATED.3IONS-SCNC: 11.1 MMOL/L (ref 5–15)
BASOPHILS # BLD AUTO: 0.02 10*3/MM3 (ref 0–0.2)
BASOPHILS NFR BLD AUTO: 0.2 % (ref 0–1.5)
BUN SERPL-MCNC: 45 MG/DL (ref 8–23)
BUN/CREAT SERPL: 50.6 (ref 7–25)
CALCIUM SPEC-SCNC: 7.3 MG/DL (ref 8.6–10.5)
CHLORIDE SERPL-SCNC: 111 MMOL/L (ref 98–107)
CO2 SERPL-SCNC: 20.9 MMOL/L (ref 22–29)
CREAT SERPL-MCNC: 0.89 MG/DL (ref 0.57–1)
DEPRECATED RDW RBC AUTO: 47.9 FL (ref 37–54)
EGFRCR SERPLBLD CKD-EPI 2021: 62.4 ML/MIN/1.73
EOSINOPHIL # BLD AUTO: 0.08 10*3/MM3 (ref 0–0.4)
EOSINOPHIL NFR BLD AUTO: 0.8 % (ref 0.3–6.2)
ERYTHROCYTE [DISTWIDTH] IN BLOOD BY AUTOMATED COUNT: 15 % (ref 12.3–15.4)
GLUCOSE BLDC GLUCOMTR-MCNC: 143 MG/DL (ref 70–130)
GLUCOSE SERPL-MCNC: 193 MG/DL (ref 65–99)
HCT VFR BLD AUTO: 32.7 % (ref 34–46.6)
HGB BLD-MCNC: 10.1 G/DL (ref 12–15.9)
IMM GRANULOCYTES # BLD AUTO: 0.08 10*3/MM3 (ref 0–0.05)
IMM GRANULOCYTES NFR BLD AUTO: 0.8 % (ref 0–0.5)
LYMPHOCYTES # BLD AUTO: 0.9 10*3/MM3 (ref 0.7–3.1)
LYMPHOCYTES NFR BLD AUTO: 8.6 % (ref 19.6–45.3)
MAGNESIUM SERPL-MCNC: 2.3 MG/DL (ref 1.6–2.4)
MCH RBC QN AUTO: 26.9 PG (ref 26.6–33)
MCHC RBC AUTO-ENTMCNC: 30.9 G/DL (ref 31.5–35.7)
MCV RBC AUTO: 87.2 FL (ref 79–97)
MONOCYTES # BLD AUTO: 0.91 10*3/MM3 (ref 0.1–0.9)
MONOCYTES NFR BLD AUTO: 8.7 % (ref 5–12)
NEUTROPHILS NFR BLD AUTO: 8.43 10*3/MM3 (ref 1.7–7)
NEUTROPHILS NFR BLD AUTO: 80.9 % (ref 42.7–76)
NRBC BLD AUTO-RTO: 0 /100 WBC (ref 0–0.2)
PLATELET # BLD AUTO: 228 10*3/MM3 (ref 140–450)
PMV BLD AUTO: 11.9 FL (ref 6–12)
POTASSIUM SERPL-SCNC: 3.4 MMOL/L (ref 3.5–5.2)
RBC # BLD AUTO: 3.75 10*6/MM3 (ref 3.77–5.28)
SODIUM SERPL-SCNC: 143 MMOL/L (ref 136–145)
VANCOMYCIN SERPL-MCNC: 18 MCG/ML (ref 5–40)
WBC NRBC COR # BLD AUTO: 10.42 10*3/MM3 (ref 3.4–10.8)

## 2024-12-05 PROCEDURE — 85025 COMPLETE CBC W/AUTO DIFF WBC: CPT | Performed by: FAMILY MEDICINE

## 2024-12-05 PROCEDURE — 80202 ASSAY OF VANCOMYCIN: CPT

## 2024-12-05 PROCEDURE — 80048 BASIC METABOLIC PNL TOTAL CA: CPT | Performed by: FAMILY MEDICINE

## 2024-12-05 PROCEDURE — 72195 MRI PELVIS W/O DYE: CPT

## 2024-12-05 PROCEDURE — 82948 REAGENT STRIP/BLOOD GLUCOSE: CPT

## 2024-12-05 PROCEDURE — 25010000002 HEPARIN (PORCINE) PER 1000 UNITS: Performed by: SURGERY

## 2024-12-05 PROCEDURE — 99232 SBSQ HOSP IP/OBS MODERATE 35: CPT | Performed by: NURSE PRACTITIONER

## 2024-12-05 PROCEDURE — 25810000003 SODIUM CHLORIDE 0.9 % SOLUTION: Performed by: SURGERY

## 2024-12-05 PROCEDURE — 83735 ASSAY OF MAGNESIUM: CPT | Performed by: FAMILY MEDICINE

## 2024-12-05 PROCEDURE — 72195 MRI PELVIS W/O DYE: CPT | Performed by: RADIOLOGY

## 2024-12-05 PROCEDURE — 25010000002 PROPOFOL 200 MG/20ML EMULSION: Performed by: NURSE ANESTHETIST, CERTIFIED REGISTERED

## 2024-12-05 PROCEDURE — 25010000002 VANCOMYCIN 5 G RECONSTITUTED SOLUTION: Performed by: SURGERY

## 2024-12-05 PROCEDURE — 25010000002 ONDANSETRON PER 1 MG: Performed by: NURSE ANESTHETIST, CERTIFIED REGISTERED

## 2024-12-05 PROCEDURE — 25010000002 FENTANYL CITRATE (PF) 50 MCG/ML SOLUTION: Performed by: NURSE ANESTHETIST, CERTIFIED REGISTERED

## 2024-12-05 RX ORDER — ONDANSETRON 2 MG/ML
4 INJECTION INTRAMUSCULAR; INTRAVENOUS AS NEEDED
Status: DISCONTINUED | OUTPATIENT
Start: 2024-12-05 | End: 2024-12-05 | Stop reason: HOSPADM

## 2024-12-05 RX ORDER — FENTANYL CITRATE 50 UG/ML
INJECTION, SOLUTION INTRAMUSCULAR; INTRAVENOUS AS NEEDED
Status: DISCONTINUED | OUTPATIENT
Start: 2024-12-05 | End: 2024-12-05 | Stop reason: SURG

## 2024-12-05 RX ORDER — SODIUM CHLORIDE, SODIUM LACTATE, POTASSIUM CHLORIDE, CALCIUM CHLORIDE 600; 310; 30; 20 MG/100ML; MG/100ML; MG/100ML; MG/100ML
125 INJECTION, SOLUTION INTRAVENOUS ONCE
Status: DISCONTINUED | OUTPATIENT
Start: 2024-12-05 | End: 2024-12-05 | Stop reason: HOSPADM

## 2024-12-05 RX ORDER — MIDAZOLAM HYDROCHLORIDE 1 MG/ML
0.5 INJECTION, SOLUTION INTRAMUSCULAR; INTRAVENOUS
Status: DISCONTINUED | OUTPATIENT
Start: 2024-12-05 | End: 2024-12-05 | Stop reason: HOSPADM

## 2024-12-05 RX ORDER — ONDANSETRON 2 MG/ML
INJECTION INTRAMUSCULAR; INTRAVENOUS AS NEEDED
Status: DISCONTINUED | OUTPATIENT
Start: 2024-12-05 | End: 2024-12-05 | Stop reason: SURG

## 2024-12-05 RX ORDER — SODIUM CHLORIDE 0.9 % (FLUSH) 0.9 %
10 SYRINGE (ML) INJECTION EVERY 12 HOURS SCHEDULED
Status: DISCONTINUED | OUTPATIENT
Start: 2024-12-05 | End: 2024-12-05 | Stop reason: HOSPADM

## 2024-12-05 RX ORDER — IPRATROPIUM BROMIDE AND ALBUTEROL SULFATE 2.5; .5 MG/3ML; MG/3ML
3 SOLUTION RESPIRATORY (INHALATION) ONCE AS NEEDED
Status: DISCONTINUED | OUTPATIENT
Start: 2024-12-05 | End: 2024-12-05 | Stop reason: HOSPADM

## 2024-12-05 RX ORDER — FAMOTIDINE 10 MG/ML
INJECTION, SOLUTION INTRAVENOUS AS NEEDED
Status: DISCONTINUED | OUTPATIENT
Start: 2024-12-05 | End: 2024-12-05 | Stop reason: SURG

## 2024-12-05 RX ORDER — OXYCODONE AND ACETAMINOPHEN 5; 325 MG/1; MG/1
1 TABLET ORAL ONCE AS NEEDED
Status: DISCONTINUED | OUTPATIENT
Start: 2024-12-05 | End: 2024-12-05 | Stop reason: HOSPADM

## 2024-12-05 RX ORDER — SODIUM CHLORIDE 9 MG/ML
40 INJECTION, SOLUTION INTRAVENOUS AS NEEDED
Status: DISCONTINUED | OUTPATIENT
Start: 2024-12-05 | End: 2024-12-05 | Stop reason: HOSPADM

## 2024-12-05 RX ORDER — SODIUM CHLORIDE 0.9 % (FLUSH) 0.9 %
10 SYRINGE (ML) INJECTION AS NEEDED
Status: DISCONTINUED | OUTPATIENT
Start: 2024-12-05 | End: 2024-12-05 | Stop reason: HOSPADM

## 2024-12-05 RX ORDER — FENTANYL CITRATE 50 UG/ML
50 INJECTION, SOLUTION INTRAMUSCULAR; INTRAVENOUS
Status: DISCONTINUED | OUTPATIENT
Start: 2024-12-05 | End: 2024-12-05 | Stop reason: HOSPADM

## 2024-12-05 RX ORDER — PROPOFOL 10 MG/ML
INJECTION, EMULSION INTRAVENOUS AS NEEDED
Status: DISCONTINUED | OUTPATIENT
Start: 2024-12-05 | End: 2024-12-05 | Stop reason: SURG

## 2024-12-05 RX ORDER — SODIUM CHLORIDE 0.9 % (FLUSH) 0.9 %
SYRINGE (ML) INJECTION AS NEEDED
Status: DISCONTINUED | OUTPATIENT
Start: 2024-12-05 | End: 2024-12-05 | Stop reason: HOSPADM

## 2024-12-05 RX ADMIN — PROPOFOL 20 MG: 10 INJECTION, EMULSION INTRAVENOUS at 13:49

## 2024-12-05 RX ADMIN — POTASSIUM CHLORIDE 40 MEQ: 1.5 POWDER, FOR SOLUTION ORAL at 01:52

## 2024-12-05 RX ADMIN — Medication 10 ML: at 10:02

## 2024-12-05 RX ADMIN — DAKIN'S SOLUTION 0.125% (QUARTER STRENGTH): 0.12 SOLUTION at 21:20

## 2024-12-05 RX ADMIN — FENTANYL CITRATE 100 MCG: 50 INJECTION INTRAMUSCULAR; INTRAVENOUS at 13:40

## 2024-12-05 RX ADMIN — HEPARIN SODIUM 5000 UNITS: 5000 INJECTION INTRAVENOUS; SUBCUTANEOUS at 21:20

## 2024-12-05 RX ADMIN — Medication 10 ML: at 21:21

## 2024-12-05 RX ADMIN — Medication 220 MG: at 10:02

## 2024-12-05 RX ADMIN — SODIUM CHLORIDE 2 G: 9 INJECTION, SOLUTION INTRAVENOUS at 13:39

## 2024-12-05 RX ADMIN — OXYCODONE HYDROCHLORIDE AND ACETAMINOPHEN 500 MG: 500 TABLET ORAL at 10:02

## 2024-12-05 RX ADMIN — ERYTHROMYCIN 1 APPLICATION: 5 OINTMENT OPHTHALMIC at 21:20

## 2024-12-05 RX ADMIN — FAMOTIDINE 20 MG: 10 INJECTION, SOLUTION INTRAVENOUS at 13:40

## 2024-12-05 RX ADMIN — AMLODIPINE BESYLATE 5 MG: 5 TABLET ORAL at 10:02

## 2024-12-05 RX ADMIN — CARBOXYMETHYLCELLULOSE SODIUM 2 DROP: 5 SOLUTION/ DROPS OPHTHALMIC at 10:02

## 2024-12-05 RX ADMIN — COLLAGENASE SANTYL 1 APPLICATION: 250 OINTMENT TOPICAL at 21:21

## 2024-12-05 RX ADMIN — PROPOFOL 20 MG: 10 INJECTION, EMULSION INTRAVENOUS at 13:44

## 2024-12-05 RX ADMIN — DAKIN'S SOLUTION 0.125% (QUARTER STRENGTH): 0.12 SOLUTION at 10:02

## 2024-12-05 RX ADMIN — VANCOMYCIN HYDROCHLORIDE 750 MG: 5 INJECTION, POWDER, LYOPHILIZED, FOR SOLUTION INTRAVENOUS at 21:19

## 2024-12-05 RX ADMIN — LINAGLIPTIN 5 MG: 5 TABLET, FILM COATED ORAL at 10:02

## 2024-12-05 RX ADMIN — Medication 220 MG: at 21:20

## 2024-12-05 RX ADMIN — OXYCODONE HYDROCHLORIDE AND ACETAMINOPHEN 500 MG: 500 TABLET ORAL at 21:20

## 2024-12-05 RX ADMIN — SERTRALINE HYDROCHLORIDE 100 MG: 50 TABLET ORAL at 10:02

## 2024-12-05 RX ADMIN — CARBOXYMETHYLCELLULOSE SODIUM 2 DROP: 5 SOLUTION/ DROPS OPHTHALMIC at 21:20

## 2024-12-05 RX ADMIN — ONDANSETRON 4 MG: 2 INJECTION INTRAMUSCULAR; INTRAVENOUS at 13:40

## 2024-12-05 NOTE — PROGRESS NOTES
PROGRESS NOTE         Patient Identification:  Name:  Tsering Alberto  Age:  89 y.o.  Sex:  female  :  1935  MRN:  [id number]  Visit Number:  [id number]  Primary Care Provider:  Nicolette Colbert MD         LOS: 2 days       ----------------------------------------------------------------------------------------------------------------------  Subjective       Chief Complaints:    Wound Check        Interval History:      Patient resting in bed this morning.  Oriented to place and person.  Currently on 1.5 L per nasal cannula with no apparent distress.  Lungs clear to auscultation bilaterally.  Abdomen soft, nontender.  Plans for OR debridement of sacral decubitus ulcer.  MRI of the pelvis to rule out osteomyelitis ordered for today.  WBC 10.42.  Blood cultures from 2024 currently in process.  Wound culture of the coccyx preliminary reports growth of E. coli, Proteus and gram-positive cocci.  Urine culture from 12/3/2024 preliminary reports greater than 100,000 colonies of gram-negative bacilli.    Review of Systems:    Unable to obtain due to baseline dementia  ----------------------------------------------------------------------------------------------------------------------      Objective       Current [facility] Meds:  amLODIPine, 5 mg, Oral, Daily  [Transfer Hold] ascorbic acid, 500 mg, Oral, BID  [Transfer Hold] carboxymethylcellulose, 2 drop, Both Eyes, TID  cefTRIAXone, 2,000 mg, Intravenous, Q24H  [Transfer Hold] collagenase, 1 Application, Topical, Nightly  [Transfer Hold] erythromycin, 1 Application, Both Eyes, Nightly  [Transfer Hold] heparin (porcine), 5,000 Units, Subcutaneous, Q12H  lactated ringers, 125 mL/hr, Intravenous, Once  [Transfer Hold] linagliptin, 5 mg, Oral, Daily  [Held by provider] metoprolol tartrate, 25 mg, Oral, BID  [Transfer Hold] sertraline, 100 mg, Oral, Daily  [Transfer Hold] sodium chloride, 10 mL, Intravenous, Q12H  sodium chloride, 10 mL,  Intravenous, Q12H  [Transfer Hold] sodium hypochlorite, , Topical, BID  vancomycin, 15 mg/kg, Intravenous, Q24H  [Transfer Hold] zinc sulfate, 220 mg, Oral, BID         ----------------------------------------------------------------------------------------------------------------------    Vital Signs:  Temp:  [97.4 °F (36.3 °C)-98.4 °F (36.9 °C)] 97.4 °F (36.3 °C)  Heart Rate:  [62-84] 77  Resp:  [16-20] 16  BP: ()/(45-63) 126/63  Mean Arterial Pressure (Non-Invasive) for the past 24 hrs (Last 3 readings):   Noninvasive MAP (mmHg)   12/05/24 1100 71   12/05/24 0752 91   12/05/24 0531 82     SpO2 Percentage    12/05/24 0752 12/05/24 1100 12/05/24 1314   SpO2: 94% 95% 95%     SpO2:  [93 %-96 %] 95 %  on  Flow (L/min) (Oxygen Therapy):  [1.5] 1.5;   Device (Oxygen Therapy): room air    Body mass index is 21.88 kg/m².  Wt Readings from Last 3 Encounters:   12/05/24 69.2 kg (152 lb 8 oz)   11/09/20 94.8 kg (209 lb)   10/05/18 91.6 kg (202 lb)        Intake/Output Summary (Last 24 hours) at 12/5/2024 1327  Last data filed at 12/5/2024 0400  Gross per 24 hour   Intake 480 ml   Output 750 ml   Net -270 ml     NPO Diet NPO Type: Sips with Meds  ----------------------------------------------------------------------------------------------------------------------      Physical Exam:    Constitutional: Elderly, chronically ill  female.  Currently on 1.5 L per nasal cannula.      HENT:  Head: Normocephalic and atraumatic.  Mouth:  Moist mucous membranes.    Eyes:  Conjunctivae and EOM are normal.  No scleral icterus.  Neck:  Neck supple.  No JVD present.    Cardiovascular:  Normal rate, regular rhythm.  4/6 murmur. no edema.  Pulmonary/Chest: Clear but diminished lung sounds bilaterally.  No respiratory distress, no wheezes, no crackles, with normal breath sounds and good air movement.  Abdominal:  Soft.  Bowel sounds are normal.  No distension and no tenderness.   Musculoskeletal:  No edema, no tenderness, and  "no deformity.  No swelling or redness of joints.  Neurological: Refused.  Baseline dementia.Skin:  Skin is warm and dry.  No rash noted.  No pallor.  Unstageable sacral decubitus ulcer.  No surrounding cellulitis.  Psychiatric:  Normal mood and affect.  Behavior is normal.        ----------------------------------------------------------------------------------------------------------------------            Results from last 7 days   Lab Units 12/05/24  0031 12/04/24  0853 12/04/24  0043 12/03/24  1857 12/03/24  1526   LACTATE mmol/L  --   --   --  1.6 3.4*   WBC 10*3/mm3 10.42 13.27* 19.82*  --  15.17*   HEMOGLOBIN g/dL 10.1* 10.8* 9.2*  --  11.8*   HEMATOCRIT % 32.7* 35.6 30.8*  --  38.0   MCV fL 87.2 89.9 90.1  --  86.8   MCHC g/dL 30.9* 30.3* 29.9*  --  31.1*   PLATELETS 10*3/mm3 228 224 222  --  285   INR   --   --   --   --  1.82*     Results from last 7 days   Lab Units 12/05/24  0031 12/04/24  2014 12/04/24  0853 12/03/24  1526   SODIUM mmol/L 143  --  142 140   POTASSIUM mmol/L 3.4* 3.4* 3.2* 3.8   MAGNESIUM mg/dL 2.3  --  1.3*  --    CHLORIDE mmol/L 111*  --  110* 100   CO2 mmol/L 20.9*  --  20.6* 25.2   BUN mg/dL 45*  --  53* 50*   CREATININE mg/dL 0.89  --  1.01* 1.00   CALCIUM mg/dL 7.3*  --  7.6* 8.5*   GLUCOSE mg/dL 193*  --  125* 189*   ALBUMIN g/dL  --   --   --  3.2*   BILIRUBIN mg/dL  --   --   --  0.7   ALK PHOS U/L  --   --   --  111   AST (SGOT) U/L  --   --   --  20   ALT (SGPT) U/L  --   --   --  11   Estimated Creatinine Clearance: 47.7 mL/min (by C-G formula based on SCr of 0.89 mg/dL).  No results found for: \"AMMONIA\"    Glucose   Date/Time Value Ref Range Status   12/05/2024 1314 143 (H) 70 - 130 mg/dL Final   12/03/2024 1920 212 (H) 70 - 130 mg/dL Final     No results found for: \"HGBA1C\"  No results found for: \"TSH\", \"FREET4\"    Blood Culture   Date Value Ref Range Status   12/04/2024 No growth at 24 hours  Preliminary   12/04/2024 No growth at 24 hours  Preliminary   12/03/2024 " "Streptococcus agalactiae (Group B) (C)  Preliminary   12/03/2024 Streptococcus agalactiae (Group B) (C)  Preliminary     Urine Culture   Date Value Ref Range Status   12/03/2024 >100,000 CFU/mL Gram Negative Bacilli (A)  Preliminary     Wound Culture   Date Value Ref Range Status   12/03/2024 Light growth (2+) Escherichia coli (A)  Preliminary   12/03/2024 Rare growth Proteus mirabilis (A)  Preliminary   12/03/2024 Light growth (2+) Gram Positive Cocci (A)  Preliminary     No results found for: \"STOOLCX\"  No results found for: \"RESPCX\"  Pain Management Panel           No data to display                  ----------------------------------------------------------------------------------------------------------------------  Imaging Results (Last 24 Hours)       Procedure Component Value Units Date/Time    MRI Pelvis Without Contrast [[id number]] Collected: 12/05/24 1043     Updated: 12/05/24 1057    Narrative:      PROCEDURE: MRI PELVIS WO CONTRAST-     HISTORY: Rule out osteomyelitis; A41.9-Sepsis, unspecified organism;  L89.95-Pressure ulcer of unspecified site, unstageable; N39.0-Urinary  tract infection, site not specified     PROCEDURE: Multiplanar multisequence imaging of the pelvis was performed  without the use of intravenous contrast.     COMPARISON: None.     FINDINGS: There is a decubitus ulcer overlying the distal sacrum and  coccyx. There is marrow edema within the coccyx consistent with  osteomyelitis. No discrete abscess is identified. Limited images of the  intrapelvic soft tissues are unremarkable. There is mild diffuse body  wall edema.          Impression:      Findings consistent with osteomyelitis involving the coccyx.                 This report was finalized on 12/5/2024 10:55 AM by Diana Ozuna M.D..               ----------------------------------------------------------------------------------------------------------------------    Pertinent Infectious Disease " Results                Assessment/Plan       Assessment       Group B strep bacteremia  Osteomyelitis of the coccyx  UTI      Plan      Patient resting in bed this morning.  Oriented to place and person.  Currently on 1.5 L per nasal cannula with no apparent distress.  Lungs clear to auscultation bilaterally.  Abdomen soft, nontender.  Plans for OR debridement of sacral decubitus ulcer.  MRI of the pelvis to rule out osteomyelitis ordered for today.  WBC 10.42.  Blood cultures from 12/4/2024 currently in process.  Wound culture of the coccyx preliminary reports growth of E. coli, Proteus and gram-positive cocci.  Urine culture from 12/3/2024 preliminary reports greater than 100,000 colonies of gram-negative bacilli.    MRI of the pelvis reported findings consistent with osteomyelitis of the coccyx.  Pending finalization of urine culture and wound culture results along with Streptococcus bacteremia will continue vancomycin pharmacy to dose and ceftriaxone 2 g IV every 24 hours.  Recommend intraoperative wound cultures.  PICC line consult ordered.  Patient require prolonged course of IV antibiotic therapy for treatment of osteomyelitis.  We will continue to follow closely and adjust antibiotic therapy as needed.      ANTIMICROBIAL THERAPY    cefTRIAXone (ROCEPHIN) 2000 mg IVPB in 100 mL NS (VTB)  vancomycin - 1-0.9 GM/250ML-%     Code Status:   Code Status and Medical Interventions: CPR (Attempt to Resuscitate); Full Support   Ordered at: 12/04/24 0840     Code Status (Patient has no pulse and is not breathing):    CPR (Attempt to Resuscitate)     Medical Interventions (Patient has pulse or is breathing):    Full Support       ROBBI Schaeffer  12/05/24  13:27 EST

## 2024-12-05 NOTE — PROGRESS NOTES
Adult Nutrition  Assessment/PES    Patient Name:  Tsering Alberto  YOB: 1935  MRN: [id number]  Admit Date:  12/3/2024    Assessment Date:  12/5/2024    Comments:  PI, MST    Po 0-25% x 3 entries.    OR today    Adv diet as indicated    Recommend: oral supplement like Boost BID and MVT daily    Will cont to follow and monitor.      Reason for Assessment       Row Name 12/05/24 1536          Reason for Assessment    Reason For Assessment identified at risk by screening criteria  [facility]     Diagnosis infection/sepsis  bacteremia, sacral wound s/p debridement today, rule out OM hx DM HTn     Identified At Risk by Screening Criteria MST SCORE 2+;large or nonhealing wound, burn or pressure injury                    Nutrition/Diet History       Row Name 12/05/24 1537          Nutrition/Diet History    Typical Intake (Food/Fluid/EN/PN) pt from nursing facility, no answer to call to room                    Labs/Tests/Procedures/Meds       Row Name 12/05/24 1537          Labs/Procedures/Meds    Lab Results Reviewed reviewed     Lab Results Comments K 3.4 L, BUn 45 H        Diagnostic Tests/Procedures    Diagnostic Test/Procedure Reviewed reviewed        Medications    Pertinent Medications Reviewed reviewed     Pertinent Medications Comments zinc vitamin C                    Physical Findings       Row Name 12/05/24 1537          Physical Findings    Overall Physical Appearance Coccyx PI, R arm/R leg                    Estimated/Assessed Needs - Anthropometrics       Row Name 12/05/24 153          Anthropometrics    Calculation Weight 69.2 kg (152 lb 8.9 oz)        Estimated/Assessed Needs    Additional Documentation Estimated Calorie Needs (Group);Fluid Requirements (Group);Protein Requirements (Group)        Estimated Calorie Needs    Estimated Calorie Require (kcal/day) 5699-9503 kcal ( mifflin 1.2-1.4)     Estimated Calorie Need Method [place]        Protein Requirements    Est  Protein Requirement Amount (gms/kg) 1.5 gm protein  104 gm pro        Fluid Requirements    Estimated Fluid Requirement Method RDA Method  1552-7892 ml                    Nutrition Prescription Ordered       Row Name 12/05/24 1539          Nutrition Prescription PO    Current PO Diet NPO                    Evaluation of Received Nutrient/Fluid Intake       Row Name 12/05/24 1539          Fluid Intake Evaluation    Oral Fluid (mL) 940        PO Evaluation    % PO Intake 25%, 10%, 0 %                       Problem/Interventions:   Problem 1       Row Name 12/05/24 1540          Nutrition Diagnoses Problem 1    Problem 1 Other (comment)  Increased nutrient needs related to bacteremia as evidenced by debridement and sacral wound                          Intervention Goal       Row Name 12/05/24 1540          Intervention Goal    General Meet nutritional needs for age/condition     PO Establish PO;PO intake (%)     PO Intake % 50 %                    Nutrition Intervention       Row Name 12/05/24 1540          Nutrition Intervention    RD/Tech Action Encourage intake;Follow Tx progress;Recommend/ordered     Recommended/Ordered Supplement                    Nutrition Prescription       Row Name 12/05/24 1540          Nutrition Prescription PO    PO Prescription Begin/change supplement     Supplement Boost     Supplement Frequency 2 times a day                    Education/Evaluation       Row Name 12/05/24 1541          Education    Education Education not appropriate at this time        Monitor/Evaluation    Monitor Per protocol;I&O;PO intake;Supplement intake;Pertinent labs;Weight;Skin status                     Electronically signed by:  Kateryna Martínez RD  12/05/24 15:41 EST

## 2024-12-05 NOTE — ANESTHESIA PREPROCEDURE EVALUATION
Patient Name:  Tsering Alberto  YOB: 1935  [id number]       Patient Care Team:  Nicolette Colbert MD as PCP - General (Internal Medicine)      General Surgery Consult Note     Date of Consultation: 12/05/24    Consulting Physician : Jordy Johnson DO    Reason for Consult : sacral decubitus wound     Subjective     I have been asked to see  Tsering Alberto , a 89 y.o. female in consultation for a large sacral decubitus wound. She has been essentially bed bound for the past 2 weeks and has noted significant progression in her wound.  On presentation, she was found to meet sepsis criteria.  Her sacral decubitus wound had become unstageable and wound care recommended a surgical consultation.      Allergy:   Allergies   Allergen Reactions    Penicillins Rash       Medications:  amLODIPine, 5 mg, Oral, Daily  ascorbic acid, 500 mg, Oral, BID  carboxymethylcellulose, 2 drop, Both Eyes, TID  cefTRIAXone, 2,000 mg, Intravenous, Q24H  collagenase, 1 Application, Topical, Nightly  erythromycin, 1 Application, Both Eyes, Nightly  heparin (porcine), 5,000 Units, Subcutaneous, Q12H  linagliptin, 5 mg, Oral, Daily  [Held by provider] metoprolol tartrate, 25 mg, Oral, BID  sertraline, 100 mg, Oral, Daily  sodium chloride, 10 mL, Intravenous, Q12H  sodium hypochlorite, , Topical, BID  vancomycin, 15 mg/kg, Intravenous, Q24H  zinc sulfate, 220 mg, Oral, BID           No current facility-administered medications on file prior to encounter.     Current Outpatient Medications on File Prior to Encounter   Medication Sig    amLODIPine (NORVASC) 5 MG tablet Take 1 tablet by mouth Daily.    apixaban (ELIQUIS) 2.5 MG tablet tablet Take 1 tablet by mouth 2 (Two) Times a Day.    ascorbic acid (VITAMIN C) 500 MG tablet Take 1 tablet by mouth 2 (Two) Times a Day.    collagenase 250 UNIT/GM ointment Apply 1 Application topically to the appropriate area as directed Every Night.    Dextran 70-Hypromellose 0.1-0.3 % solution  "Apply 2 drops to eye(s) as directed by provider 2 (Two) Times a Day.    erythromycin (ROMYCIN) 5 MG/GM ophthalmic ointment Administer 1 Application to both eyes Every Night.    metoprolol tartrate (LOPRESSOR) 25 MG tablet Take 1 tablet by mouth 2 (Two) Times a Day.    senna 8.6 MG tablet Take 2 tablets by mouth 2 (Two) Times a Day.    sertraline (ZOLOFT) 100 MG tablet Take 1 tablet by mouth Daily.    SITagliptin (JANUVIA) 100 MG tablet Take 1 tablet by mouth Daily.    zinc sulfate (ZINCATE) 220 (50 Zn) MG capsule Take 1 capsule by mouth 2 (Two) Times a Day.    Mouthwashes (Biotene dry mouth) liquid liquid Take 15 mL by mouth Every 12 (Twelve) Hours As Needed for Dry Mouth.    ondansetron (ZOFRAN) 4 MG tablet Take 1 tablet by mouth Every 6 (Six) Hours As Needed for Nausea or Vomiting.    White Petrolatum-Mineral Oil (Systane Nighttime) ointment ophthalmic ointment Administer 1 Application to both eyes Every 12 (Twelve) Hours As Needed (dry eyes).       PMHx:   Past Medical History:   Diagnosis Date    Diabetes mellitus     Hypertension        Past Surgical History:  Noncontributory    Family History: Noncontributory     Social History:  Noncontributory     Review of Systems   Pertinent items are noted in HPI     Constitutional: No fevers, chills or malaise   Eyes: Denies visual changes    Cardiovascular: Denies chest pain, palpitations   Pulmonary: Denies cough or shortness of breath   Abdominal/ GI: Nontender    Genitourinary: Denies dysuria or hematuria   Musculoskeletal: Denies any but chronic joint aches, pains or deformities   Psychiatric: No recent mood changes   Neurologic: No paresthesias or loss of function          Objective     Physical Exam:      Vital Signs  /54 (BP Location: Right arm, Patient Position: Lying)   Pulse 82   Temp 98.4 °F (36.9 °C) (Oral)   Resp 20   Ht 177.8 cm (70\")   Wt 69.2 kg (152 lb 8 oz)   SpO2 95%   BMI 21.88 kg/m²     Intake/Output Summary (Last 24 hours) at 12/5/2024 " 1248  Last data filed at 12/5/2024 0400  Gross per 24 hour   Intake 720 ml   Output 750 ml   Net -30 ml         Physical Exam:    Head: Normocephalic, atraumatic.   Eyes: Pupils equal, round, react to light   Mouth: No lesions noted  CV: Regular rate and rhythm   Lungs: Bilateral chest rise and fall, no use of accessory muscles   Abdomen: Soft, nontender, nondistended  Extremities:  No cyanosis, clubbing or edema bilaterally   Neurologic: No gross deficits      Assessment and Plan:    Problem List Items Addressed This Visit          Musculoskeletal and Injuries    Pressure injury, unstageable    Relevant Orders    Case request (Completed)       Other    * (Principal) Sepsis - Primary    Relevant Medications    ketorolac (TORADOL) injection 15 mg (Completed)     Other Visit Diagnoses       Acute UTI        Relevant Medications    cefTRIAXone (ROCEPHIN) 2,000 mg in sodium chloride 0.9 % 100 mL IVPB-VTB             Active Hospital Problems    Diagnosis  POA    **Sepsis [A41.9]  Yes    Pressure injury, unstageable [L89.95]  Unknown      Resolved Hospital Problems   No resolved problems to display.     Ms. Alberto is an 89-year-old female with a unstageable sacral decubitus wound.  She will benefit from debridement in the operating room.  This was discussed with family member at bedside.  Will plan to proceed to the operating room tomorrow.  I have made her n.p.o. at midnight.    I discussed the patient's findings and my recommendations with the patient and/or family, as well as the primary team     Tyrell Stanford MD  12/05/24  12:48 EST       Anesthesia Evaluation     no history of anesthetic complications:   NPO Solid Status: > 8 hours  NPO Liquid Status: > 8 hours           Airway   Mallampati: II  TM distance: >3 FB  Neck ROM: full  No difficulty expected  Dental - normal exam     Pulmonary - normal exam   Cardiovascular - normal exam    PT is on anticoagulation therapy  Patient on routine beta blocker and Beta  blocker given within 24 hours of surgery    (+) hypertension      Neuro/Psych  GI/Hepatic/Renal/Endo    (+) diabetes mellitus    Musculoskeletal     Abdominal  - normal exam    Bowel sounds: normal.   Substance History      OB/GYN          Other        ROS/Med Hx Other: Eliquis 12/3                    Anesthesia Plan    ASA 3     general     intravenous induction     Anesthetic plan, risks, benefits, and alternatives have been provided, discussed and informed consent has been obtained with: patient.        CODE STATUS:    Code Status (Patient has no pulse and is not breathing): CPR (Attempt to Resuscitate)  Medical Interventions (Patient has pulse or is breathing): Full Support

## 2024-12-05 NOTE — PLAN OF CARE
Goal Outcome Evaluation:  Plan of Care Reviewed With: patient, family   Pt resting in bed, watching television. Pt has tolerated all interventions. No complaints/concerns. No acute distress noted. Call light within reach. Plan of care ongoing.

## 2024-12-05 NOTE — PROGRESS NOTES
Pharmacokinetics Service Note:    Tsering Alberto is a 89 y.o. female being managed by Pharmacy for vancomycin dosing.    Indication for Therapy: SSTI  Current Regimen: 1 gm IV q24h  Current Day of Therapy and Ordered Duration: Day 3  Level Reported and Timing with Respect to Previous Dose: 18 mcg/ml; 18.5 hours post dose  Calculated Steady State AUC: 622 mg/L*hr  PAUC: 100%    Assessment/Plan: Will decrease the dose to 750 mg IV q24h.    Monitoring Planned: Will follow up with a level before the 3rd dose of new regimen. Monitor renal function.    Thanks for this consult,  Cadence Leon, AlessandroD

## 2024-12-05 NOTE — PLAN OF CARE
Goal Outcome Evaluation:   Pt. Has had a non-eventful night. Pt. VSS at this time. Pt. Has no c/o. Pt shows no sins of acute distress at this time. Plan of care ongoing.

## 2024-12-05 NOTE — ANESTHESIA POSTPROCEDURE EVALUATION
Patient: Tsering Alberto    Procedure Summary       Date: 12/05/24 Room / Location:  [facility] /  [facility]    Anesthesia Start: 1339 Anesthesia Stop: 1401    Procedure: DEBRIDEMENT SACRAL ULCER/WOUND (Abdomen) Diagnosis:       Pressure injury, unstageable, unspecified location      (Pressure injury, unstageable, unspecified location [L89.95])    Surgeons: Mehran Boyd MD Provider: Tyrell Stanford MD    Anesthesia Type: general ASA Status: 3            Anesthesia Type: general    Vitals  Vitals Value Taken Time   /52 12/05/24 1422   Temp 98 °F (36.7 °C) 12/05/24 1422   Pulse 82 12/05/24 1422   Resp 18 12/05/24 1422   SpO2 93 % 12/05/24 1422           Post Anesthesia Care and Evaluation    Patient location during evaluation: bedside  Patient participation: complete - patient participated  Level of consciousness: awake and alert  Pain score: 1  Pain management: adequate    Airway patency: patent  Anesthetic complications: No anesthetic complications  PONV Status: none  Cardiovascular status: acceptable  Respiratory status: acceptable  Hydration status: acceptable

## 2024-12-05 NOTE — CASE MANAGEMENT/SOCIAL WORK
Discharge Planning Assessment  [facility]     Patient Name: Tsering Alberto  MRN: [id number]  Today's Date: 12/5/2024    Admit Date: 12/3/2024    Plan: Pt received a Continue Care Consult on this date. Pt does not have a qualifying CCU/ PCU stay for [facility]. SS provided physician, CM, and ID with an update. SS received a message from RN stating pt's son was requesting for SS to contact. SS to contact pt's son.     Discharge Plan       Row Name 12/05/24 1550       Plan    Plan Pt received a Continue Care Consult on this date. Pt does not have a qualifying CCU/ PCU stay for [facility]. SS provided physician, CM, and ID with an update. SS received a message from RN stating pt's son was requesting for SS to contact. SS to contact pt's son.    1640: SS spoke with pt and son at bedside on this date who requested for SS to see if [facility] has any short term beds available. SS contacted [facility] per Griselda and awaiting response at this time.           Continued Care and Services - Admitted Since 12/3/2024    No active coordination exists for this encounter.    Expected Discharge Date and Time       Expected Discharge Date Expected Discharge Time    Dec 6, 2024        JULIANNA Heller

## 2024-12-06 LAB
A-A DO2: 73.1 MMHG (ref 0–300)
ARTERIAL PATENCY WRIST A: ABNORMAL
ATMOSPHERIC PRESS: 734 MMHG
BACTERIA SPEC AEROBE CULT: ABNORMAL
BASE EXCESS BLDA CALC-SCNC: -0.5 MMOL/L (ref 0–2)
BDY SITE: ABNORMAL
CO2 BLDA-SCNC: 25.7 MMOL/L (ref 22–33)
COHGB MFR BLD: 0.2 % (ref 0–5)
GAS FLOW AIRWAY: 1.5 LPM
GRAM STN SPEC: ABNORMAL
HCO3 BLDA-SCNC: 24.4 MMOL/L (ref 20–26)
HCT VFR BLD CALC: 31.4 % (ref 38–51)
HGB BLDA-MCNC: 10.2 G/DL (ref 13.5–17.5)
INHALED O2 CONCENTRATION: 26 %
ISOLATED FROM: ABNORMAL
ISOLATED FROM: ABNORMAL
Lab: ABNORMAL
METHGB BLD QL: 0.6 % (ref 0–3)
MODALITY: ABNORMAL
OXYHGB MFR BLDV: 90.2 % (ref 94–99)
PCO2 BLDA: 40.4 MM HG (ref 35–45)
PCO2 TEMP ADJ BLD: ABNORMAL MM[HG]
PH BLDA: 7.39 PH UNITS (ref 7.35–7.45)
PH, TEMP CORRECTED: ABNORMAL
PO2 BLDA: 60.1 MM HG (ref 83–108)
PO2 TEMP ADJ BLD: ABNORMAL MM[HG]
SAO2 % BLDCOA: 90.9 % (ref 94–99)
VENTILATOR MODE: ABNORMAL

## 2024-12-06 PROCEDURE — 25010000002 VANCOMYCIN 5 G RECONSTITUTED SOLUTION: Performed by: SURGERY

## 2024-12-06 PROCEDURE — 25010000002 HEPARIN (PORCINE) PER 1000 UNITS: Performed by: SURGERY

## 2024-12-06 PROCEDURE — 99232 SBSQ HOSP IP/OBS MODERATE 35: CPT | Performed by: NURSE PRACTITIONER

## 2024-12-06 PROCEDURE — 0JB70ZZ EXCISION OF BACK SUBCUTANEOUS TISSUE AND FASCIA, OPEN APPROACH: ICD-10-PCS | Performed by: SURGERY

## 2024-12-06 PROCEDURE — 82805 BLOOD GASES W/O2 SATURATION: CPT

## 2024-12-06 PROCEDURE — 83050 HGB METHEMOGLOBIN QUAN: CPT

## 2024-12-06 PROCEDURE — 02HV33Z INSERTION OF INFUSION DEVICE INTO SUPERIOR VENA CAVA, PERCUTANEOUS APPROACH: ICD-10-PCS | Performed by: FAMILY MEDICINE

## 2024-12-06 PROCEDURE — 82375 ASSAY CARBOXYHB QUANT: CPT

## 2024-12-06 PROCEDURE — 99232 SBSQ HOSP IP/OBS MODERATE 35: CPT

## 2024-12-06 PROCEDURE — 25810000003 SODIUM CHLORIDE 0.9 % SOLUTION: Performed by: SURGERY

## 2024-12-06 PROCEDURE — 25010000002 CEFTRIAXONE PER 250 MG: Performed by: SURGERY

## 2024-12-06 PROCEDURE — 11042 DBRDMT SUBQ TIS 1ST 20SQCM/<: CPT | Performed by: SURGERY

## 2024-12-06 PROCEDURE — 36600 WITHDRAWAL OF ARTERIAL BLOOD: CPT

## 2024-12-06 PROCEDURE — 94761 N-INVAS EAR/PLS OXIMETRY MLT: CPT

## 2024-12-06 PROCEDURE — C1751 CATH, INF, PER/CENT/MIDLINE: HCPCS

## 2024-12-06 RX ORDER — SODIUM CHLORIDE 0.9 % (FLUSH) 0.9 %
10 SYRINGE (ML) INJECTION AS NEEDED
Status: DISCONTINUED | OUTPATIENT
Start: 2024-12-06 | End: 2024-12-09 | Stop reason: HOSPADM

## 2024-12-06 RX ORDER — SODIUM CHLORIDE 0.9 % (FLUSH) 0.9 %
20 SYRINGE (ML) INJECTION AS NEEDED
Status: DISCONTINUED | OUTPATIENT
Start: 2024-12-06 | End: 2024-12-09 | Stop reason: HOSPADM

## 2024-12-06 RX ORDER — SODIUM CHLORIDE 0.9 % (FLUSH) 0.9 %
10 SYRINGE (ML) INJECTION EVERY 12 HOURS SCHEDULED
Status: DISCONTINUED | OUTPATIENT
Start: 2024-12-06 | End: 2024-12-09 | Stop reason: HOSPADM

## 2024-12-06 RX ORDER — SODIUM CHLORIDE 9 MG/ML
40 INJECTION, SOLUTION INTRAVENOUS AS NEEDED
Status: DISCONTINUED | OUTPATIENT
Start: 2024-12-06 | End: 2024-12-09 | Stop reason: HOSPADM

## 2024-12-06 RX ADMIN — SERTRALINE HYDROCHLORIDE 100 MG: 50 TABLET ORAL at 09:24

## 2024-12-06 RX ADMIN — Medication 10 ML: at 21:59

## 2024-12-06 RX ADMIN — CARBOXYMETHYLCELLULOSE SODIUM 2 DROP: 5 SOLUTION/ DROPS OPHTHALMIC at 22:19

## 2024-12-06 RX ADMIN — AMLODIPINE BESYLATE 5 MG: 5 TABLET ORAL at 09:24

## 2024-12-06 RX ADMIN — HEPARIN SODIUM 5000 UNITS: 5000 INJECTION INTRAVENOUS; SUBCUTANEOUS at 21:58

## 2024-12-06 RX ADMIN — OXYCODONE HYDROCHLORIDE AND ACETAMINOPHEN 500 MG: 500 TABLET ORAL at 21:58

## 2024-12-06 RX ADMIN — Medication 10 ML: at 09:25

## 2024-12-06 RX ADMIN — Medication 220 MG: at 09:24

## 2024-12-06 RX ADMIN — OXYCODONE HYDROCHLORIDE AND ACETAMINOPHEN 500 MG: 500 TABLET ORAL at 09:25

## 2024-12-06 RX ADMIN — DAKIN'S SOLUTION 0.125% (QUARTER STRENGTH): 0.12 SOLUTION at 22:00

## 2024-12-06 RX ADMIN — VANCOMYCIN HYDROCHLORIDE 750 MG: 5 INJECTION, POWDER, LYOPHILIZED, FOR SOLUTION INTRAVENOUS at 21:59

## 2024-12-06 RX ADMIN — COLLAGENASE SANTYL 1 APPLICATION: 250 OINTMENT TOPICAL at 21:59

## 2024-12-06 RX ADMIN — SODIUM CHLORIDE 2000 MG: 9 INJECTION, SOLUTION INTRAVENOUS at 12:07

## 2024-12-06 RX ADMIN — CARBOXYMETHYLCELLULOSE SODIUM 2 DROP: 5 SOLUTION/ DROPS OPHTHALMIC at 17:20

## 2024-12-06 RX ADMIN — ERYTHROMYCIN 1 APPLICATION: 5 OINTMENT OPHTHALMIC at 22:00

## 2024-12-06 RX ADMIN — LINAGLIPTIN 5 MG: 5 TABLET, FILM COATED ORAL at 09:24

## 2024-12-06 RX ADMIN — HEPARIN SODIUM 5000 UNITS: 5000 INJECTION INTRAVENOUS; SUBCUTANEOUS at 09:25

## 2024-12-06 RX ADMIN — Medication 220 MG: at 21:58

## 2024-12-06 RX ADMIN — MINERAL OIL, PETROLATUM 1 APPLICATION: 425; 568 OINTMENT OPHTHALMIC at 21:59

## 2024-12-06 RX ADMIN — CARBOXYMETHYLCELLULOSE SODIUM 2 DROP: 5 SOLUTION/ DROPS OPHTHALMIC at 09:25

## 2024-12-06 RX ADMIN — DAKIN'S SOLUTION 0.125% (QUARTER STRENGTH): 0.12 SOLUTION at 09:25

## 2024-12-06 NOTE — DISCHARGE PLACEMENT REQUEST
"Tsering Zcaarias (89 y.o. Female)       Date of Birth   1935    Social Security Number       Address   [address] [facility] [address]    Home Phone   [phone]    MRN   [id number]       Anglican   Unknown    Marital Status   Unknown                            Admission Date   12/3/24    Admission Type   Emergency    Admitting Provider   Jordy Johnson DO    Attending Provider   Jordy Johnson DO    Department, Room/Bed   [address], 3325/1P       Discharge Date       Discharge Disposition       Discharge Destination                                 Attending Provider: Jordy Johnson DO    Allergies: Penicillins    Isolation: None   Infection: None   Code Status: CPR    Ht: 177.8 cm (70\")   Wt: 69.8 kg (153 lb 12.8 oz)    Admission Cmt: None   Principal Problem: Sepsis [A41.9]                   Active Insurance as of 12/3/2024       Primary Coverage       Payor Plan Insurance Group Employer/Plan Group    ANTHEM MEDICARE REPLACEMENT ANTHEM MEDICARE ADVANTAGE [id number]       Payor Plan Address Payor Plan Phone Number Payor Plan Fax Number Effective Dates    [address] [phone]  1/1/2024 - None Entered    [facility] [address]         Subscriber Name Subscriber Birth Date Member ID       TSERING ZACARIAS 1935 [id number]               Secondary Coverage       Payor Plan Insurance Group Employer/Plan Group    [place]        Payor Plan Address Payor Plan Phone Number Payor Plan Fax Number Effective Dates    [address] [phone]  12/3/2024 - None Entered    [facility] [address]         Subscriber Name Subscriber Birth Date Member ID       TSERING ZACARIAS 1935 [id number]                     Emergency Contacts        (Rel.) Home Phone Work Phone Mobile Phone    Rajesh Zacarias (Son) [phone] -- [phone]              Insurance Information                  ANTHEM MEDICARE REPLACEMENT/ANTHEM MEDICARE ADVANTAGE Phone: " [phone]    Subscriber: Tsering Alberto Subscriber#: [id number]    Group#: [id number] Precert#: [id number]    Authorization#: -- Effective Date: --        [place] Phone: [phone]    Subscriber: Tsering Alberto Subscriber#: [id number]    Group#: -- Precert#: [id number]    Authorization#: [id number] Effective Date: --             History & Physical        Beech CreekJordy DO at 24 1849          [facility]   HISTORY AND PHYSICAL    Patient Name: Tsering Alberto  : 1935  MRN: [id number]  Primary Care Physician:  Nicolette Colbert MD  Date of admission: 12/3/2024    Subjective  Subjective     Chief Complaint: Fever and chills    History of Present Illness  Patient is an 89-year-old female with past medical history of diabetes mellitus and hypertension.  She resides at a local nursing home and was sent to the emergency department because of an elevated temp as high as 101.  She also has had some lower abdominal pain and they have been dealing with a sacral decubitus that is very slow to heal.  Here in the emergency department her temperature was 102 and her heart rate remained above 90.  She was found to have 2 sources of infection including a urinary tract infection as well as the sacral decubitus that appears to have cellulitic border and be draining purulent material.  Patient's lactic acid was also elevated at 3.4 as well as her procalcitonin is 0.96.  Her urine analysis showed 4+ bacteria but was nitrite negative.  Patient has had several different bacteria within her urine over the last year and a half including Klebsiella E. coli and Enterobacter.  They obtain blood cultures wound cultures and her urine will be cultured.  The patient has had Enterococcus in her urine was multidrug-resistant but susceptible to vancomycin.  The patient will be admitted for further evaluation IV hydration and IV antibiotics  Review of Systems   As stated above in his present  illness all other systems were reviewed and subsequently negative  Personal History     Past Medical History:   Diagnosis Date    Diabetes mellitus     Hypertension        Past Surgical History:   Procedure Laterality Date    CARDIAC SURGERY      CATARACT EXTRACTION      COLONOSCOPY      REPLACEMENT TOTAL KNEE         Family History: family history includes Breast cancer in an other family member; Ovarian cancer in her sister. Otherwise pertinent FHx was reviewed and not pertinent to current issue.    Social History:  reports that she has never smoked. She has never used smokeless tobacco. She reports that she does not drink alcohol.    Home Medications:  Biotene dry mouth, Dextran 70-Hypromellose, SITagliptin, Systane Nighttime, amLODIPine, apixaban, ascorbic acid, collagenase, erythromycin, metoprolol tartrate, ondansetron, senna, sertraline, and zinc sulfate    Allergies:  Allergies   Allergen Reactions    Penicillins Rash       Objective   Objective     Vitals:   Temp:  [102 °F (38.9 °C)] 102 °F (38.9 °C)  Heart Rate:  [87-99] 92  Resp:  [18] 18  BP: (111-161)/(64-98) 128/68    Physical Exam    Result Review   Result Review:  I have personally reviewed the results from the time of this admission to 12/3/2024 18:49 EST and agree with these findings:  []  Laboratory list / accordion  []  Microbiology  []  Radiology  []  EKG/Telemetry   []  Cardiology/Vascular   []  Pathology  []  Old records  []  Other:  Most notable findings include:          [place] Urine Culture Tube - Urine, Catheter  Collected: 12/03/24 1538  Final result  Specimen: Urine, Catheter      Extra Tube Hold for add-ons.             12/03/24 1618  Urinalysis With Microscopic If Indicated (No Culture) - Urine, Catheter  Collected: 12/03/24 1538  Final result  Specimen: Urine, Catheter    Color, UA Yellow Bilirubin, UA Negative   Appearance, UA Cloudy Abnormal  Blood, UA Small (1+) Abnormal    pH, UA 7.0 Protein,  mg/dL (2+) Abnormal     Specific Gravity, UA 1.016 Leuk Esterase, UA Large (3+) Abnormal    Glucose, UA Negative Nitrite, UA Negative   Ketones, UA Negative Urobilinogen, UA 1.0 E.U./dL          12/03/24 1618  Urinalysis, Microscopic Only - Urine, Catheter  Collected: 12/03/24 1538  Final result  Specimen: Urine, Catheter    RBC, UA 3-5 Abnormal  /HPF Squamous Epithelial Cells, UA 0-2 /HPF   WBC, UA Too Numerous to Count Abnormal  /HPF Hyaline Casts, UA 3-6 /LPF   Bacteria, UA 4+ Abnormal  /HPF Methodology Automated Microscopy          12/03/24 1602  Lactic Acid, Plasma  Collected: 12/03/24 1526  Final result  Specimen: Blood from Arm, Left    Lactate 3.4 High Critical  mmol/L            12/03/24 1557  Procalcitonin  Collected: 12/03/24 1526  Final result  Specimen: Blood from Arm, Left    Procalcitonin 0.96 High  ng/mL            12/03/24 1549  Comprehensive Metabolic Panel  Collected: 12/03/24 1526  Final result  Specimen: Blood from Arm, Left    Glucose 189 High  mg/dL ALT (SGPT) 11 U/L   BUN 50 High  mg/dL AST (SGOT) 20 U/L   Creatinine 1.00 mg/dL Alkaline Phosphatase 111 U/L   Sodium 140 mmol/L Total Bilirubin 0.7 mg/dL   Potassium 3.8 mmol/L  Globulin 3.5 gm/dL   Chloride 100 mmol/L A/G Ratio 0.9 g/dL   CO2 25.2 mmol/L BUN/Creatinine Ratio 50.0 High    Calcium 8.5 Low  mg/dL Anion Gap 14.8 mmol/L   Total Protein 6.7 g/dL eGFR 54.3 Low  mL/min/1.73   Albumin 3.2 Low  g/dL            12/03/24 1538  Protime-INR  Collected: 12/03/24 1526  Final result  Specimen: Blood from Arm, Left    Protime 21.2 High  Seconds INR 1.82 High           12/03/24 1538  aPTT  Collected: 12/03/24 1526  Final result  Specimen: Blood from Arm, Left    PTT 41.5 High  seconds            12/03/24 1532  [place] Draw  Collected: 12/03/24 1526  Final result  Specimen: Blood from Arm, Left           12/03/24 1532  Green Top (Gel)  Collected: 12/03/24 1526  Final result  Specimen: Blood from Arm, Left    Extra Tube Hold for add-ons.              12/03/24 1532  Lavender Top  Collected: 12/03/24 1526  Final result  Specimen: Blood from Arm, Left    Extra Tube hold for add-on             12/03/24 1532  Gold Top - SST  Collected: 12/03/24 1526  Final result  Specimen: Blood from Arm, Left    Extra Tube Hold for add-ons.             12/03/24 1532  Light Blue Top  Collected: 12/03/24 1526  Final result  Specimen: Blood from Arm, Left    Extra Tube Hold for add-ons.             12/03/24 1529  CBC & Differential  Collected: 12/03/24 1526  Final result  Specimen: Blood from Arm, Left           12/03/24 1529  CBC Auto Differential  Collected: 12/03/24 1526  Final result  Specimen: Blood from Arm, Left    WBC 15.17 High  10*3/mm3 Lymphocyte % 5.5 Low  %   RBC 4.38 10*6/mm3 Monocyte % 4.3 Low  %   Hemoglobin 11.8 Low  g/dL Eosinophil % 0.1 Low  %   Hematocrit 38.0 % Basophil % 0.2 %   MCV 86.8 fL Immature Grans % 0.5 %   MCH 26.9 pg Neutrophils, Absolute 13.56 High  10*3/mm3   MCHC 31.1 Low  g/dL Lymphocytes, Absolute 0.84 10*3/mm3   RDW 14.6 % Monocytes, Absolute 0.65 10*3/mm3   RDW-SD 46.7 fl Eosinophils, Absolute 0.02 10*3/mm3   MPV 11.8 fL Basophils, Absolute 0.03 10*3/mm3   Platelets 285 10*3/mm3 Immature Grans, Absolute 0.07 High  10*3/mm3   Neutrophil % 89.4 High  % nRBC 0.0 /100 WBC                XR Chest 1 View  Performed: 12/03/24 1554  Final          Impression: No acute cardiopulmonary process. This report was finalized on 12/3/2024 4:00 PM by Diana Ozuna M.D..        Assessment & Plan  Assessment / Plan     Brief Patient Summary:  Tsering Alberto is a 89 y.o. female who presents to the emergency department was found to be septic with a urinary tract infection and infected sacral decubitus.  She will be admitted for further evaluation and treatment    Active Hospital Problems/plan:  Severe sepsis  Complicated urinary tract infection  Infected decubitus ulcer  Leukocytosis  -Patient will be admitted to the hospital service  - Patient has  been started on the sepsis protocol  - Patient received fluid resuscitation in the emergency department we will continue to provide IV fluids in the form of normal saline at 100 cc/h  - I started the patient on IV ceftriaxone and IV vancomycin for broad-spectrum coverage  - We are awaiting urine blood and wound cultures  - Monitor the patient's laboratory data closely  - Keep patient on telemetry for closer monitoring  - Subjectively treat the fever with Tylenol  - Consulted wound care  - With the patient potentially suffering from multiple sources of infection also consult infectious disease    ANTHONY most likely secondary to volume depletion  - We will fluid resuscitate as stated above  - We will repeat lab work in the a.m. to ensure improvement in her renal functions  -Try and avoid nephrotoxic drugs although the patient may require NSAIDs if her fever remains elevated despite Tylenol administration      VTE Prophylaxis:  Pharmacologic VTE prophylaxis orders are signed & held. Pharmacologic VTE prophylaxis orders are present.        CODE STATUS:       Admission Status:  I believe this patient meets inpatient status.    Jordy Johnson DO    Electronically signed by Jordy Johnson DO at 12/03/24 5369       Current Facility-Administered Medications   Medication Dose Route Frequency Provider Last Rate Last Admin    amLODIPine (NORVASC) tablet 5 mg  5 mg Oral Daily Mehran Boyd MD   5 mg at 12/06/24 0924    artificial tears ophthalmic ointment 1 Application  1 Application Both Eyes Q12H PRN Mehran Boyd MD        ascorbic acid (VITAMIN C) tablet 500 mg  500 mg Oral BID Mehran Boyd MD   500 mg at 12/06/24 0925    sennosides-docusate (PERICOLACE) 8.6-50 MG per tablet 2 tablet  2 tablet Oral BID PRN Mehran Boyd MD        And    polyethylene glycol (MIRALAX) packet 17 g  17 g Oral Daily PRN Mehran Boyd MD        And    bisacodyl (DULCOLAX) EC tablet 5 mg  5 mg Oral Daily PRN  Mehran Boyd MD        And    bisacodyl (DULCOLAX) suppository 10 mg  10 mg Rectal Daily PRN Mehran Boyd MD        Calcium Replacement - Follow Nurse / BPA Driven Protocol   Not Applicable PRN Mehran Boyd MD        carboxymethylcellulose (REFRESH PLUS) 0.5 % ophthalmic solution 2 drop  2 drop Both Eyes TID Mehran Boyd MD   2 drop at 12/06/24 0925    cefTRIAXone (ROCEPHIN) 2,000 mg in sodium chloride 0.9 % 100 mL IVPB-VTB  2,000 mg Intravenous Q24H Mehran Boyd  mL/hr at 12/04/24 1109 2 g at 12/05/24 1339    collagenase ointment 1 Application  1 Application Topical Nightly Mehran Boyd MD   1 Application at 12/05/24 2121    erythromycin (ROMYCIN) ophthalmic ointment 1 Application  1 Application Both Eyes Nightly Mehran Boyd MD   1 Application at 12/05/24 2120    heparin (porcine) 5000 UNIT/ML injection 5,000 Units  5,000 Units Subcutaneous Q12H Mehran Boyd MD   5,000 Units at 12/06/24 0925    HYDROcodone-acetaminophen (NORCO) 5-325 MG per tablet 1 tablet  1 tablet Oral Q6H PRN Mehran Boyd MD        linagliptin (TRADJENTA) tablet 5 mg  5 mg Oral Daily Mehran Boyd MD   5 mg at 12/06/24 0924    Magnesium Standard Dose Replacement - Follow Nurse / BPA Driven Protocol   Not Applicable PRN Mehran Boyd MD        [Held by provider] metoprolol tartrate (LOPRESSOR) tablet 25 mg  25 mg Oral BID Mehran Boyd MD   25 mg at 12/04/24 1109    nitroglycerin (NITROSTAT) SL tablet 0.4 mg  0.4 mg Sublingual Q5 Min PRN Mehran Boyd MD        ondansetron ODT (ZOFRAN-ODT) disintegrating tablet 4 mg  4 mg Oral Q6H PRN Mehran Boyd MD        Or    ondansetron (ZOFRAN) injection 4 mg  4 mg Intravenous Q6H PRN Mehran Boyd MD        Phosphorus Replacement - Follow Nurse / BPA Driven Protocol   Not Applicable PRN Mehran Boyd MD        Potassium Replacement - Follow Nurse / BPA Driven  Protocol   Not Applicable PRN Mehran Boyd MD        sertraline (ZOLOFT) tablet 100 mg  100 mg Oral Daily Mehran Boyd MD   100 mg at 24 0924    sodium chloride 0.9 % flush 10 mL  10 mL Intravenous PRN Mehran Boyd MD        sodium chloride 0.9 % flush 10 mL  10 mL Intravenous Q12H Mehran Boyd MD   10 mL at 24 09    sodium chloride 0.9 % flush 10 mL  10 mL Intravenous PRN Mehran Boyd MD        sodium chloride 0.9 % infusion 40 mL  40 mL Intravenous PRN Mehran Boyd MD        sodium hypochlorite (DAKIN'S 1/4 STRENGTH) 0.125 % topical solution 0.125% solution   Topical BID Mehran Boyd MD   Given at 24 09    vancomycin 750 mg/250 mL 0.9% NS IVPB ([facility])  750 mg Intravenous Q24H Mehran Boyd MD   750 mg at 24    zinc sulfate (ZINCATE) capsule 220 mg  220 mg Oral BID Mehran Boyd MD   220 mg at 24 09        Physician Progress Notes (most recent note)        Chula Mccall APRN at 24 09                     PROGRESS NOTE         Patient Identification:  Name:  Tsering Alberto  Age:  89 y.o.  Sex:  female  :  1935  MRN:  [id number]  Visit Number:  [id number]  Primary Care Provider:  Nicolette Colbert MD         LOS: 2 days       ----------------------------------------------------------------------------------------------------------------------  Subjective       Chief Complaints:    Wound Check        Interval History:      Patient resting in bed this morning.  Oriented to place and person.  Currently on 1.5 L per nasal cannula with no apparent distress.  Lungs clear to auscultation bilaterally.  Abdomen soft, nontender.  Plans for OR debridement of sacral decubitus ulcer.  MRI of the pelvis to rule out osteomyelitis ordered for today.  WBC 10.42.  Blood cultures from 2024 currently in process.  Wound culture of the coccyx preliminary reports growth of E. coli, Proteus and  gram-positive cocci.  Urine culture from 12/3/2024 preliminary reports greater than 100,000 colonies of gram-negative bacilli.    Review of Systems:    Unable to obtain due to baseline dementia  ----------------------------------------------------------------------------------------------------------------------      Objective       [facility] Meds:  amLODIPine, 5 mg, Oral, Daily  [Transfer Hold] ascorbic acid, 500 mg, Oral, BID  [Transfer Hold] carboxymethylcellulose, 2 drop, Both Eyes, TID  cefTRIAXone, 2,000 mg, Intravenous, Q24H  [Transfer Hold] collagenase, 1 Application, Topical, Nightly  [Transfer Hold] erythromycin, 1 Application, Both Eyes, Nightly  [Transfer Hold] heparin (porcine), 5,000 Units, Subcutaneous, Q12H  lactated ringers, 125 mL/hr, Intravenous, Once  [Transfer Hold] linagliptin, 5 mg, Oral, Daily  [Held by provider] metoprolol tartrate, 25 mg, Oral, BID  [Transfer Hold] sertraline, 100 mg, Oral, Daily  [Transfer Hold] sodium chloride, 10 mL, Intravenous, Q12H  sodium chloride, 10 mL, Intravenous, Q12H  [Transfer Hold] sodium hypochlorite, , Topical, BID  vancomycin, 15 mg/kg, Intravenous, Q24H  [Transfer Hold] zinc sulfate, 220 mg, Oral, BID         ----------------------------------------------------------------------------------------------------------------------    Vital Signs:  Temp:  [97.4 °F (36.3 °C)-98.4 °F (36.9 °C)] 97.4 °F (36.3 °C)  Heart Rate:  [62-84] 77  Resp:  [16-20] 16  BP: ()/(45-63) 126/63  Mean Arterial Pressure (Non-Invasive) for the past 24 hrs (Last 3 readings):   Noninvasive MAP (mmHg)   12/05/24 1100 71   12/05/24 0752 91   12/05/24 0531 82     SpO2 Percentage    12/05/24 0752 12/05/24 1100 12/05/24 1314   SpO2: 94% 95% 95%     SpO2:  [93 %-96 %] 95 %  on  Flow (L/min) (Oxygen Therapy):  [1.5] 1.5;   Device (Oxygen Therapy): room air    Body mass index is 21.88 kg/m².  Wt Readings from Last 3 Encounters:   12/05/24 69.2 kg (152 lb 8 oz)   11/09/20 94.8 kg  (209 lb)   10/05/18 91.6 kg (202 lb)        Intake/Output Summary (Last 24 hours) at 12/5/2024 1327  Last data filed at 12/5/2024 0400  Gross per 24 hour   Intake 480 ml   Output 750 ml   Net -270 ml     NPO Diet NPO Type: Sips with Meds  ----------------------------------------------------------------------------------------------------------------------      Physical Exam:    Constitutional: Elderly, chronically ill  female.  Currently on 1.5 L per nasal cannula.      HENT:  Head: Normocephalic and atraumatic.  Mouth:  Moist mucous membranes.    Eyes:  Conjunctivae and EOM are normal.  No scleral icterus.  Neck:  Neck supple.  No JVD present.    Cardiovascular:  Normal rate, regular rhythm.  4/6 murmur. no edema.  Pulmonary/Chest: Clear but diminished lung sounds bilaterally.  No respiratory distress, no wheezes, no crackles, with normal breath sounds and good air movement.  Abdominal:  Soft.  Bowel sounds are normal.  No distension and no tenderness.   Musculoskeletal:  No edema, no tenderness, and no deformity.  No swelling or redness of joints.  Neurological: Refused.  Baseline dementia.Skin:  Skin is warm and dry.  No rash noted.  No pallor.  Unstageable sacral decubitus ulcer.  No surrounding cellulitis.  Psychiatric:  Normal mood and affect.  Behavior is normal.        ----------------------------------------------------------------------------------------------------------------------            Results from last 7 days   Lab Units 12/05/24  0031 12/04/24  0853 12/04/24  0043 12/03/24  1857 12/03/24  1526   LACTATE mmol/L  --   --   --  1.6 3.4*   WBC 10*3/mm3 10.42 13.27* 19.82*  --  15.17*   HEMOGLOBIN g/dL 10.1* 10.8* 9.2*  --  11.8*   HEMATOCRIT % 32.7* 35.6 30.8*  --  38.0   MCV fL 87.2 89.9 90.1  --  86.8   MCHC g/dL 30.9* 30.3* 29.9*  --  31.1*   PLATELETS 10*3/mm3 228 224 222  --  285   INR   --   --   --   --  1.82*     Results from last 7 days   Lab Units 12/05/24  0031 12/04/24  2014  "12/04/24  0853 12/03/24  1526   SODIUM mmol/L 143  --  142 140   POTASSIUM mmol/L 3.4* 3.4* 3.2* 3.8   MAGNESIUM mg/dL 2.3  --  1.3*  --    CHLORIDE mmol/L 111*  --  110* 100   CO2 mmol/L 20.9*  --  20.6* 25.2   BUN mg/dL 45*  --  53* 50*   CREATININE mg/dL 0.89  --  1.01* 1.00   CALCIUM mg/dL 7.3*  --  7.6* 8.5*   GLUCOSE mg/dL 193*  --  125* 189*   ALBUMIN g/dL  --   --   --  3.2*   BILIRUBIN mg/dL  --   --   --  0.7   ALK PHOS U/L  --   --   --  111   AST (SGOT) U/L  --   --   --  20   ALT (SGPT) U/L  --   --   --  11   Estimated Creatinine Clearance: 47.7 mL/min (by C-G formula based on SCr of 0.89 mg/dL).  No results found for: \"AMMONIA\"    Glucose   Date/Time Value Ref Range Status   12/05/2024 1314 143 (H) 70 - 130 mg/dL Final   12/03/2024 1920 212 (H) 70 - 130 mg/dL Final     No results found for: \"HGBA1C\"  No results found for: \"TSH\", \"FREET4\"    Blood Culture   Date Value Ref Range Status   12/04/2024 No growth at 24 hours  Preliminary   12/04/2024 No growth at 24 hours  Preliminary   12/03/2024 Streptococcus agalactiae (Group B) (C)  Preliminary   12/03/2024 Streptococcus agalactiae (Group B) (C)  Preliminary     Urine Culture   Date Value Ref Range Status   12/03/2024 >100,000 CFU/mL Gram Negative Bacilli (A)  Preliminary     Wound Culture   Date Value Ref Range Status   12/03/2024 Light growth (2+) Escherichia coli (A)  Preliminary   12/03/2024 Rare growth Proteus mirabilis (A)  Preliminary   12/03/2024 Light growth (2+) Gram Positive Cocci (A)  Preliminary     No results found for: \"STOOLCX\"  No results found for: \"RESPCX\"  Pain Management Panel           No data to display                  ----------------------------------------------------------------------------------------------------------------------  Imaging Results (Last 24 Hours)       Procedure Component Value Units Date/Time    MRI Pelvis Without Contrast [[id number]] Collected: 12/05/24 1043     Updated: 12/05/24 7641    Narrative:      " PROCEDURE: MRI PELVIS WO CONTRAST-     HISTORY: Rule out osteomyelitis; A41.9-Sepsis, unspecified organism;  L89.95-Pressure ulcer of unspecified site, unstageable; N39.0-Urinary  tract infection, site not specified     PROCEDURE: Multiplanar multisequence imaging of the pelvis was performed  without the use of intravenous contrast.     COMPARISON: None.     FINDINGS: There is a decubitus ulcer overlying the distal sacrum and  coccyx. There is marrow edema within the coccyx consistent with  osteomyelitis. No discrete abscess is identified. Limited images of the  intrapelvic soft tissues are unremarkable. There is mild diffuse body  wall edema.          Impression:      Findings consistent with osteomyelitis involving the coccyx.                 This report was finalized on 12/5/2024 10:55 AM by Diana Ozuna M.D..               ----------------------------------------------------------------------------------------------------------------------    Pertinent Infectious Disease Results                Assessment/Plan       Assessment       Group B strep bacteremia  Osteomyelitis of the coccyx  UTI      Plan      Patient resting in bed this morning.  Oriented to place and person.  Currently on 1.5 L per nasal cannula with no apparent distress.  Lungs clear to auscultation bilaterally.  Abdomen soft, nontender.  Plans for OR debridement of sacral decubitus ulcer.  MRI of the pelvis to rule out osteomyelitis ordered for today.  WBC 10.42.  Blood cultures from 12/4/2024 currently in process.  Wound culture of the coccyx preliminary reports growth of E. coli, Proteus and gram-positive cocci.  Urine culture from 12/3/2024 preliminary reports greater than 100,000 colonies of gram-negative bacilli.    MRI of the pelvis reported findings consistent with osteomyelitis of the coccyx.  Pending finalization of urine culture and wound culture results along with Streptococcus bacteremia will continue vancomycin pharmacy to dose  and ceftriaxone 2 g IV every 24 hours.  Recommend intraoperative wound cultures.  PICC line consult ordered.  Patient require prolonged course of IV antibiotic therapy for treatment of osteomyelitis.  We will continue to follow closely and adjust antibiotic therapy as needed.      ANTIMICROBIAL THERAPY    cefTRIAXone (ROCEPHIN) 2000 mg IVPB in 100 mL NS (VTB)  vancomycin - 1-0.9 GM/250ML-%     Code Status:   Code Status and Medical Interventions: CPR (Attempt to Resuscitate); Full Support   Ordered at: 12/04/24 0840     Code Status (Patient has no pulse and is not breathing):    CPR (Attempt to Resuscitate)     Medical Interventions (Patient has pulse or is breathing):    Full Support       ROBBI Schaeffer  12/05/24  13:27 EST    Electronically signed by Chula Mccall APRN at 12/05/24 1334       Physical Therapy Notes (most recent note)    No notes exist for this encounter.       Occupational Therapy Notes (most recent note)    No notes exist for this encounter.       Speech Language Pathology Notes (most recent note)    No notes exist for this encounter.       ADL Documentation (most recent)      Flowsheet Row Most Recent Value   Transferring 4 - completely dependent   Toileting 1 - assistive equipment   Bathing 2 - assistive person   Dressing 2 - assistive person   Eating 2 - assistive person   Communication 0 - understands/communicates without difficulty   Swallowing 0 - swallows foods/liquids without difficulty   Equipment Currently Used at Home none            Discharge Summary    No notes of this type exist for this encounter.       Discharge Order (From admission, onward)      None

## 2024-12-06 NOTE — PLAN OF CARE
Goal Outcome Evaluation:  Plan of Care Reviewed With: patient, family  Progress: no change   Pt resting in bed, watching television. Pt has tolerated all interventions. No complaints/concerns. No acute distress noted. Call light within reach. Plan of care ongoing.

## 2024-12-06 NOTE — CONSULTS
"Assessment:  Diagnosis: sepsis    Allergies   Allergen Reactions    Penicillins Rash     Beta lactam allergy details  Antibiotic reaction: rash  Age at reaction: child  Dose to reaction time: unknown  Reason for antibiotic: unknown  Epinephrine required for reaction?: unknown  Tolerated antibiotics: amoxicillin, unknown           Order Date/Time: 12/5/2024 1334  Indications: iv abx to 1/15/2024  LABS:  Lab Results   Component Value Date    INR 1.82 (H) 12/03/2024    PROTIME 21.2 (H) 12/03/2024     Lab Results   Component Value Date    PTT 41.5 (H) 12/03/2024     Lab Results   Component Value Date    WBC 10.42 12/05/2024    HGB 10.1 (L) 12/05/2024    HCT 32.7 (L) 12/05/2024    MCV 87.2 12/05/2024     12/05/2024     Lab Results   Component Value Date    BUN 45 (H) 12/05/2024     Lab Results   Component Value Date    CREATININE 0.89 12/05/2024     No results found for: \"EGFRIFNONA\", \"EGFRIFAFRI\"  Labs Reviewed: all labs reviewed    Contraindications for PICC/Midline:  No contraindications noted    Recommendations:  PowerPICC Catheter with Sherlock 3CG Tip Positioning System (TPS) Stylet; 4 fr; single lumen  Upper Right Basilic    PICC Measurements:  Upper Arm Circumference: 36 cm  Trimmed Catheter Length: 39 cm  Internal Catheter Length: 39 cm  External Catheter Length: 0 cm    Procedure Time Out:  Time out Time: 1400  Correct Patient Identity: Yes  Correct Surgical Side and Site Are Marked: Yes  Agreement on Procedure to be done: Yes  Antibiotic Given: N/A  RN: RADHA Ramirez RN    Informed consent obtained via telephone for PICC placement from patient son, Rajesh Alberto ([phone]). Consent witnessed by ASHLEY Moreno. PowerPICC placed with ultrasound guidance and placement verified with ECG. Sterile field maintained. Minimal blood loss noted. Catheter flushes easily. Blood return noted. PowerPICC patient care booklet placed in patient chartlet. Patient nurse, Josh RAMIREZ, made aware that PICC is in upper R arm and ready " for use.       Laura Ramirez RN

## 2024-12-06 NOTE — PLAN OF CARE
Goal Outcome Evaluation:      Patient currently resting in bed. A&Ox 4 with intermittent confusion at times. VSS. No visible s/s of acute distress noted at this time. No requests or complaints at this time. Plan of care ongoing.

## 2024-12-06 NOTE — CASE MANAGEMENT/SOCIAL WORK
Discharge Planning Assessment   [facility]     Patient Name: Tsering Alberto  MRN: [id number]  Today's Date: 12/6/2024    Admit Date: 12/3/2024    Plan: [facility] this date about pt's referral. SS was notified that they only had one bed available and they filled bed and will not be able to accept pt. SS to contact pt's sonRajesh to provide an update. SS to follow.       Discharge Plan       Row Name 12/06/24 1331       Plan    Plan SS followed up with [facility] on this date about pt's referral. SS was notified that they only had one bed available and they filled bed and will not be able to accept pt. SS to contact pt's sonRajesh to provide an update. SS to follow.      1624:SS discussed pt on MD rounds. SS contacted [facility] who states they will be able to take pt back on both antibiotics at discharge. SS followed up with pt's sonRajesh on this date to provide an update. Pt's son is agreeable for pt to return back to facility. Pt's son requests for [facility].  [facility]ified [facility].  provided physician with an update.         Continued Care and Services - Admitted Since 12/3/2024       Destination       Service Provider Request Status Services Address Phone Fax Patient Preferred    [facility] - Request Sent -- [address] [place] [address] [phone] --        Expected Discharge Date and Time       Expected Discharge Date Expected Discharge Time    Dec 6, 2024        JULIANNA Heller

## 2024-12-06 NOTE — PROGRESS NOTES
"Enter Query Response Below      Query Response: Acute kidney injury (ANTHONY) was not supporte              If applicable, please update the problem list.     Patient: Tsering Alberto        : 1935  Account: [id number]           Admit Date:         How to Respond to this query:       a. Click New Note     b. Answer query within the yellow box.                c. Update the Problem List, if applicable.      If you have any questions about this query contact me at: [email or web address]     Dr. Johnson,    Patient with history of type 2 diabetes presented 12/3 for chills, fever, and sacral decubitus ulcer. H&P notes \"ANTHONY most likely secondary to volume depletion.\" Patient treated with monitoring, avoidance of nephrotoxic medications, NS bolus x3, and IV antibiotics.     Creatinine levels this encounter include:  12/3: 1.00  : 1.01  : 0.89    After study, was acute kidney injury (ANTHONY) clinically supported during this admission?    - Acute kidney injury (ANTHONY) was supported with additional clinical indicators: ___________________  - Acute kidney injury (ANTHONY) was not supported  - Other ___________________  - Unable to clinically determine    ANTHONY is defined by KDIGO as any of the following:   Increase in creatinine > 0.3 mg/dl within 48 hours or less; or   Increase in creatinine level to > 1.5x baseline (historical or measure), which is known or presumed to have occurred within the prior 7 days   Urine volume <0.5 ml/kg/h for 6 hours.  Reference article: http://www.kdigo.org/clinical_practice_guidelines/pdf/KDIGO%20AKI%20Guideline.pdf (as published in [employer] Supplements, The Official Journal of the International Society of Nephrology, vol. 2, issue 1, 2012.)    By submitting this query, we are merely seeking further clarification of documentation to accurately reflect all conditions that you are monitoring, evaluating, treating or that extend the hospitalization or utilize additional " resources of care. Please utilize your independent clinical judgment when addressing the question(s) above.     This query and your response, once completed, will be entered into the legal medical record.    Sincerely,  Cesar Sharif RN, CDS  Clinical Documentation Integrity Program

## 2024-12-06 NOTE — OP NOTE
DEBRIDEMENT SACRAL ULCER/WOUND  Procedure Note    Tsering Alberto  12/5/2024    Pre-op Diagnosis:   Pressure injury, unstageable, unspecified location [L89.95]    Post-op Diagnosis:     Post-Op Diagnosis Codes:     * Pressure injury, unstageable, unspecified location [L89.95]    Procedure(s):  DEBRIDEMENT SACRAL ULCER/WOUND    Surgeon(s):  Mehran Boyd MD    Anesthesia: Choice    Staff:   Circulator: Thea Thapa RN; Mary Jo Blair RN  Assistant: Karie Brandt CSA    Findings:   Necrotic skin and subcutaneous fat over a 3 x 4 cm sacral wound with exposed bone but no definitive osteomyelitis  Sharp excisional debridement with scissors performed over the 3 x 4 cm area involving skin is obtaining his fat.        Operative Procedure: Patient was taken operating suite and placed in right lateral decubitus position.  The sacrum was prepped and draped in usual sterile fashion after induction of MAC anesthesia.  There was a 3 x 4 cm sacral wound with necrotic skin and subcutaneous fat with exposed coccyx but no definitive osteomyelitis.  Using Metzenbaum scissors all necrotic skin and subcutaneous fat over the 3 x 4 cm area was sharply debrided to healthy punctate bleeding.  The wound was then irrigated and packed with gauze.  A bandage was applied and the patient was awakened from anesthesia and taken to recovery.  She tolerated the procedure well.    Estimated Blood Loss: 5 mL    Specimens:   None           Drains: None     Grafts/Implants: None    Complications: None           Mehran Boyd MD     Date: 12/6/2024  Time: 08:45 EST

## 2024-12-06 NOTE — PROGRESS NOTES
Patient Identification:  Name:  Tsering Alberto  Age:  89 y.o.  Sex:  female  :  1935  MRN:  [id number]  Visit Number:  [id number]  Primary Care Provider:  Nicolette Colbert MD    Length of stay:  3    Subjective/Interval History/Consultants/Procedures     Chief Complaint:   Chief Complaint   Patient presents with    Wound Check       Subjective/Interval History:    89 y.o. female who was admitted on 12/3/2024 with  UTI, sacral wound     PMH is significant for hypertension, diabetes mellitus, HFpEF, CAD, Hx mitral valve replacement, dementia   For complete admission information, please see history and physical.     Consultations:  Infectious disease  CM/SW  General surgery   PT OT    Procedures/Scans:  CXR  MRI pelvis without contrast  Debridement of sacral ulcer, 2024    Today, the patient was seen and examined post surgical debridement, was a bit confused but able to deny any complaint today including shortness of breath or pain.      Room location at the time of evaluation was [facility].    ----------------------------------------------------------------------------------------------------------------------  [facility] Meds:  amLODIPine, 5 mg, Oral, Daily  ascorbic acid, 500 mg, Oral, BID  carboxymethylcellulose, 2 drop, Both Eyes, TID  cefTRIAXone, 2,000 mg, Intravenous, Q24H  collagenase, 1 Application, Topical, Nightly  erythromycin, 1 Application, Both Eyes, Nightly  heparin (porcine), 5,000 Units, Subcutaneous, Q12H  linagliptin, 5 mg, Oral, Daily  [Held by provider] metoprolol tartrate, 25 mg, Oral, BID  sertraline, 100 mg, Oral, Daily  sodium chloride, 10 mL, Intravenous, Q12H  sodium hypochlorite, , Topical, BID  vancomycin, 750 mg, Intravenous, Q24H  zinc sulfate, 220 mg, Oral, BID         ----------------------------------------------------------------------------------------------------------------------      Objective     Vital Signs:  Temp:  [97.4 °F (36.3 °C)-98.5 °F (36.9 °C)]  98.4 °F (36.9 °C)  Heart Rate:  [76-84] 78  Resp:  [16-20] 18  BP: ()/(42-63) 123/55      12/03/24  1900 12/05/24  0531 12/06/24  0500   Weight: 65.9 kg (145 lb 3.2 oz) 69.2 kg (152 lb 8 oz) 69.8 kg (153 lb 12.8 oz)     Body mass index is 22.07 kg/m².    Intake/Output Summary (Last 24 hours) at 12/6/2024 1031  Last data filed at 12/6/2024 0800  Gross per 24 hour   Intake 480 ml   Output 550 ml   Net -70 ml     I/O this shift:  In: 240 [P.O.:240]  Out: -   Diet: Regular/House; Fluid Consistency: Thin (IDDSI 0)  ----------------------------------------------------------------------------------------------------------------------    Physical Exam  Vitals and nursing note reviewed.   Constitutional:       General: She is not in acute distress.     Appearance: She is ill-appearing.   HENT:      Head: Normocephalic and atraumatic.      Mouth/Throat:      Mouth: Mucous membranes are dry.   Eyes:      Extraocular Movements: Extraocular movements intact.   Cardiovascular:      Rate and Rhythm: Normal rate and regular rhythm.   Pulmonary:      Effort: Pulmonary effort is normal.      Breath sounds: Normal breath sounds.   Skin:     General: Skin is warm and dry.   Neurological:      Mental Status: She is alert. She is disoriented.   Psychiatric:         Mood and Affect: Mood normal.                ----------------------------------------------------------------------------------------------------------------------  Tele:      ----------------------------------------------------------------------------------------------------------------------      Results from last 7 days   Lab Units 12/05/24  0031 12/04/24  0853 12/04/24  0043 12/03/24  1857 12/03/24  1526   LACTATE mmol/L  --   --   --  1.6 3.4*   WBC 10*3/mm3 10.42 13.27* 19.82*  --  15.17*   HEMOGLOBIN g/dL 10.1* 10.8* 9.2*  --  11.8*   HEMATOCRIT % 32.7* 35.6 30.8*  --  38.0   MCV fL 87.2 89.9 90.1  --  86.8   MCHC g/dL 30.9* 30.3* 29.9*  --  31.1*   PLATELETS  "10*3/mm3 228 224 222  --  285   INR   --   --   --   --  1.82*         Results from last 7 days   Lab Units 12/05/24  0031 12/04/24 2014 12/04/24  0853 12/03/24  1526   SODIUM mmol/L 143  --  142 140   POTASSIUM mmol/L 3.4* 3.4* 3.2* 3.8   MAGNESIUM mg/dL 2.3  --  1.3*  --    CHLORIDE mmol/L 111*  --  110* 100   CO2 mmol/L 20.9*  --  20.6* 25.2   BUN mg/dL 45*  --  53* 50*   CREATININE mg/dL 0.89  --  1.01* 1.00   CALCIUM mg/dL 7.3*  --  7.6* 8.5*   GLUCOSE mg/dL 193*  --  125* 189*   ALBUMIN g/dL  --   --   --  3.2*   BILIRUBIN mg/dL  --   --   --  0.7   ALK PHOS U/L  --   --   --  111   AST (SGOT) U/L  --   --   --  20   ALT (SGPT) U/L  --   --   --  11   Estimated Creatinine Clearance: 48.1 mL/min (by C-G formula based on SCr of 0.89 mg/dL).  No results found for: \"AMMONIA\"      Blood Culture   Date Value Ref Range Status   12/04/2024 No growth at 2 days  Preliminary   12/04/2024 No growth at 2 days  Preliminary   12/03/2024 Streptococcus agalactiae (Group B) (C)  Final   12/03/2024 Streptococcus agalactiae (Group B) (C)  Final     Urine Culture   Date Value Ref Range Status   12/03/2024 >100,000 CFU/mL Gram Negative Bacilli (A)  Preliminary     Wound Culture   Date Value Ref Range Status   12/03/2024 Light growth (2+) Escherichia coli (A)  Preliminary   12/03/2024 Rare growth Proteus mirabilis (A)  Preliminary   12/03/2024 Light growth (2+) Gram Positive Cocci (A)  Preliminary     No results found for: \"STOOLCX\"  ----------------------------------------------------------------------------------------------------------------------  Imaging Results (Last 24 Hours)       Procedure Component Value Units Date/Time    MRI Pelvis Without Contrast [[id number]] Collected: 12/05/24 1043     Updated: 12/05/24 1057    Narrative:      PROCEDURE: MRI PELVIS WO CONTRAST-     HISTORY: Rule out osteomyelitis; A41.9-Sepsis, unspecified organism;  L89.95-Pressure ulcer of unspecified site, unstageable; N39.0-Urinary  tract " infection, site not specified     PROCEDURE: Multiplanar multisequence imaging of the pelvis was performed  without the use of intravenous contrast.     COMPARISON: None.     FINDINGS: There is a decubitus ulcer overlying the distal sacrum and  coccyx. There is marrow edema within the coccyx consistent with  osteomyelitis. No discrete abscess is identified. Limited images of the  intrapelvic soft tissues are unremarkable. There is mild diffuse body  wall edema.          Impression:      Findings consistent with osteomyelitis involving the coccyx.                 This report was finalized on 12/5/2024 10:55 AM by Diana Ozuna M.D..             ----------------------------------------------------------------------------------------------------------------------   I have reviewed the above laboratory values for 12/06/24    Assessment/Plan     Active Hospital Problems    Diagnosis  POA    **Sepsis [A41.9]  Yes    Pressure injury, unstageable [L89.95]  Unknown         ASSESSMENT/PLAN:    Severe sepsis, POA  Group B strep bacteremia  Acute urinary tract infection  Infected decubitus ulcer of coccyx with underlying osteomyelitis   Patient was sent to the ED from local [facility] secondary to fever and worsening appearance of sacral decubitus ulcer.  On workup in the ED she did meet severe sepsis criteria with tachycardia, leukocytosis, fever up to 102 and lactate greater than 3.  Pro-Merritt was 0.96.  She was given Merrem and vancomycin in the ED and started on maintenance normal saline at 100 mL/h  Given history of drug-resistant organisms we have consulted infectious disease for further assistance and recommendations, appreciated.Urine culture is preliminary and growing gram negative bacilli, Wound culture is growing e coli, proteus, and gram positive cocci.  Repeat blood cultures remain without growth to date.  Wound was further evaluated with MRI of the pelvis which identified findings consistent with osteomyelitis of the  coccyx.  Rocephin and vancomycin have been continued for now.   General surgery also consulted and following- patient underwent surgical debridement 12/6/24 and tolerated well.   Will continue supportive care measures and close monitoring.  Trend labs  CM/SW assisting with discharge planning. PT OT to see and evaluate as well. Patient presented from [facility] however family is interested in possible short term placement at the [facility]. Will follow up.     Hypomagnesemia  Hypokalemia   Monitor and replace electrolytes as needed per protocol    Arthritis  HTN  HLD  Diabetes mellitus   VSS- metoprolol tartrate held currently  Home regimen resumed as indicated  Plan to add SSI if needed  -----------  -DVT prophylaxis: [facility]  -Disposition plans/anticipated needs: anticipate return to SNF once medically stable and abx plan in place        The patient is high risk due to the following diagnoses/reasons:  sepsis, bacteremia, osteomyelitis, UTI        José Luis Beckett PA-C  12/06/24  10:31 EST

## 2024-12-06 NOTE — PROGRESS NOTES
PROGRESS NOTE         Patient Identification:  Name:  Tsering Alberto  Age:  89 y.o.  Sex:  female  :  1935  MRN:  [id number]  Visit Number:  [id number]  Primary Care Provider:  Nicolette Colbert MD         LOS: 3 days       ----------------------------------------------------------------------------------------------------------------------  Subjective       Chief Complaints:    Wound Check        Interval History:      Resting in bed this morning.  Continues on 1.5 L per nasal cannula with no apparent distress.  Reports feeling tired this morning.  Lung sounds diminished bilaterally.  Abdomen soft, nontender.  Afebrile, no reported diarrhea.  Blood cultures from 2024 show no growth thus far.  Status post sacral wound debridement on 2024.  Blood cultures from 2024 show no growth thus far.    Review of Systems:    Unable to obtain due to baseline dementia  ----------------------------------------------------------------------------------------------------------------------      Objective       Current Hospital Meds:  amLODIPine, 5 mg, Oral, Daily  ascorbic acid, 500 mg, Oral, BID  carboxymethylcellulose, 2 drop, Both Eyes, TID  cefTRIAXone, 2,000 mg, Intravenous, Q24H  collagenase, 1 Application, Topical, Nightly  erythromycin, 1 Application, Both Eyes, Nightly  heparin (porcine), 5,000 Units, Subcutaneous, Q12H  linagliptin, 5 mg, Oral, Daily  [Held by provider] metoprolol tartrate, 25 mg, Oral, BID  sertraline, 100 mg, Oral, Daily  sodium chloride, 10 mL, Intravenous, Q12H  sodium hypochlorite, , Topical, BID  vancomycin, 750 mg, Intravenous, Q24H  zinc sulfate, 220 mg, Oral, BID         ----------------------------------------------------------------------------------------------------------------------    Vital Signs:  Temp:  [97.4 °F (36.3 °C)-98.5 °F (36.9 °C)] 98.4 °F (36.9 °C)  Heart Rate:  [76-84] 78  Resp:  [16-20] 18  BP: ()/(42-63) 123/55  Mean Arterial  Pressure (Non-Invasive) for the past 24 hrs (Last 3 readings):   Noninvasive MAP (mmHg)   12/06/24 0355 63   12/05/24 2300 58   12/05/24 2028 63     SpO2 Percentage    12/05/24 1636 12/05/24 1706 12/05/24 1736   SpO2: 94% 95% 93%     SpO2:  [92 %-95 %] 93 %  on  Flow (L/min) (Oxygen Therapy):  [1.5-3] 3;   Device (Oxygen Therapy): nasal cannula    Body mass index is 22.07 kg/m².  Wt Readings from Last 3 Encounters:   12/06/24 69.8 kg (153 lb 12.8 oz)   11/09/20 94.8 kg (209 lb)   10/05/18 91.6 kg (202 lb)        Intake/Output Summary (Last 24 hours) at 12/6/2024 1154  Last data filed at 12/6/2024 0800  Gross per 24 hour   Intake 480 ml   Output 550 ml   Net -70 ml     Diet: Regular/House; Fluid Consistency: Thin (IDDSI 0)  ----------------------------------------------------------------------------------------------------------------------      Physical Exam:    Constitutional: Elderly, chronically ill  female.  Currently on 1.5 L per nasal cannula.  Sleepy this morning.  HENT:  Head: Normocephalic and atraumatic.  Mouth:  Moist mucous membranes.    Eyes:  Conjunctivae and EOM are normal.  No scleral icterus.  Neck:  Neck supple.  No JVD present.    Cardiovascular:  Normal rate, regular rhythm.  4/6 murmur. no edema.  Pulmonary/Chest: Clear but diminished lung sounds bilaterally.  No respiratory distress, no wheezes, no crackles, with normal breath sounds and good air movement.  Abdominal:  Soft.  Bowel sounds are normal.  No distension and no tenderness.   Musculoskeletal:  No edema, no tenderness, and no deformity.  No swelling or redness of joints.  Neurological: Refused.  Baseline dementia.  Skin:  Skin is warm and dry.  No rash noted.  No pallor.  Unstageable sacral decubitus ulcer.  No surrounding cellulitis.  Psychiatric:  Normal mood and affect.  Behavior is normal.        ----------------------------------------------------------------------------------------------------------------------           "  Results from last 7 days   Lab Units 12/05/24  0031 12/04/24  0853 12/04/24  0043 12/03/24  1857 12/03/24  1526   LACTATE mmol/L  --   --   --  1.6 3.4*   WBC 10*3/mm3 10.42 13.27* 19.82*  --  15.17*   HEMOGLOBIN g/dL 10.1* 10.8* 9.2*  --  11.8*   HEMATOCRIT % 32.7* 35.6 30.8*  --  38.0   MCV fL 87.2 89.9 90.1  --  86.8   MCHC g/dL 30.9* 30.3* 29.9*  --  31.1*   PLATELETS 10*3/mm3 228 224 222  --  285   INR   --   --   --   --  1.82*     Results from last 7 days   Lab Units 12/05/24  0031 12/04/24 2014 12/04/24  0853 12/03/24  1526   SODIUM mmol/L 143  --  142 140   POTASSIUM mmol/L 3.4* 3.4* 3.2* 3.8   MAGNESIUM mg/dL 2.3  --  1.3*  --    CHLORIDE mmol/L 111*  --  110* 100   CO2 mmol/L 20.9*  --  20.6* 25.2   BUN mg/dL 45*  --  53* 50*   CREATININE mg/dL 0.89  --  1.01* 1.00   CALCIUM mg/dL 7.3*  --  7.6* 8.5*   GLUCOSE mg/dL 193*  --  125* 189*   ALBUMIN g/dL  --   --   --  3.2*   BILIRUBIN mg/dL  --   --   --  0.7   ALK PHOS U/L  --   --   --  111   AST (SGOT) U/L  --   --   --  20   ALT (SGPT) U/L  --   --   --  11   Estimated Creatinine Clearance: 48.1 mL/min (by C-G formula based on SCr of 0.89 mg/dL).  No results found for: \"AMMONIA\"    Glucose   Date/Time Value Ref Range Status   12/05/2024 1314 143 (H) 70 - 130 mg/dL Final   12/03/2024 1920 212 (H) 70 - 130 mg/dL Final     No results found for: \"HGBA1C\"  No results found for: \"TSH\", \"FREET4\"    Blood Culture   Date Value Ref Range Status   12/04/2024 No growth at 24 hours  Preliminary   12/04/2024 No growth at 24 hours  Preliminary   12/03/2024 Streptococcus agalactiae (Group B) (C)  Preliminary   12/03/2024 Streptococcus agalactiae (Group B) (C)  Preliminary     Urine Culture   Date Value Ref Range Status   12/03/2024 >100,000 CFU/mL Gram Negative Bacilli (A)  Preliminary     Wound Culture   Date Value Ref Range Status   12/03/2024 Light growth (2+) Escherichia coli (A)  Preliminary   12/03/2024 Rare growth Proteus mirabilis (A)  Preliminary " "  12/03/2024 Light growth (2+) Gram Positive Cocci (A)  Preliminary     No results found for: \"STOOLCX\"  No results found for: \"RESPCX\"  Pain Management Panel           No data to display                  ----------------------------------------------------------------------------------------------------------------------  Imaging Results (Last 24 Hours)       ** No results found for the last 24 hours. **            ----------------------------------------------------------------------------------------------------------------------    Pertinent Infectious Disease Results                Assessment/Plan       Assessment       Group B strep bacteremia  Osteomyelitis of the coccyx  UTI      Plan      Resting in bed this morning.  Continues on 1.5 L per nasal cannula with no apparent distress.  Reports feeling tired this morning.  Lung sounds diminished bilaterally.  Abdomen soft, nontender.  Afebrile, no reported diarrhea.  Blood cultures from 12/4/2024 show no growth thus far.  Status post sacral wound debridement on 12/6/2024.  Blood cultures from 12/4/2024 show no growth thus far.    Wound culture of the coccyx preliminary reports growth of E. coli, Proteus and gram-positive cocci.  Urine culture from 12/3/2024 preliminary reports greater than 100,000 colonies of gram-negative bacilli.    MRI of the pelvis reported findings consistent with osteomyelitis of the coccyx.  Pending finalization of urine culture and wound culture results along with Streptococcus bacteremia will continue vancomycin pharmacy to dose and ceftriaxone 2 g IV every 24 hours.  Recommend intraoperative wound cultures.  PICC line consult ordered.  Patient will require 6 weeks of IV antibiotic therapy for treatment of osteomyelitis through 1/15/2025.  We will continue to follow closely and adjust antibiotic therapy as needed.      ANTIMICROBIAL THERAPY    cefTRIAXone (ROCEPHIN) 2000 mg IVPB in 100 mL NS (VTB)  vancomycin     Code Status:   Code " Status and Medical Interventions: CPR (Attempt to Resuscitate); Full Support   Ordered at: 12/04/24 0840     Code Status (Patient has no pulse and is not breathing):    CPR (Attempt to Resuscitate)     Medical Interventions (Patient has pulse or is breathing):    Full Support       ROBBI Schaeffer  12/06/24  11:54 EST

## 2024-12-07 ENCOUNTER — APPOINTMENT (OUTPATIENT)
Dept: GENERAL RADIOLOGY | Facility: HOSPITAL | Age: 89
DRG: 853 | End: 2024-12-07
Payer: MEDICARE

## 2024-12-07 LAB
ANION GAP SERPL CALCULATED.3IONS-SCNC: 8.2 MMOL/L (ref 5–15)
BASOPHILS # BLD AUTO: 0.03 10*3/MM3 (ref 0–0.2)
BASOPHILS NFR BLD AUTO: 0.3 % (ref 0–1.5)
BUN SERPL-MCNC: 23 MG/DL (ref 8–23)
BUN/CREAT SERPL: 31.9 (ref 7–25)
CALCIUM SPEC-SCNC: 7.8 MG/DL (ref 8.6–10.5)
CHLORIDE SERPL-SCNC: 112 MMOL/L (ref 98–107)
CO2 SERPL-SCNC: 22.8 MMOL/L (ref 22–29)
CREAT SERPL-MCNC: 0.72 MG/DL (ref 0.57–1)
CRP SERPL-MCNC: 13.66 MG/DL (ref 0–0.5)
DEPRECATED RDW RBC AUTO: 50.2 FL (ref 37–54)
EGFRCR SERPLBLD CKD-EPI 2021: 80.5 ML/MIN/1.73
EOSINOPHIL # BLD AUTO: 0.27 10*3/MM3 (ref 0–0.4)
EOSINOPHIL NFR BLD AUTO: 2.4 % (ref 0.3–6.2)
ERYTHROCYTE [DISTWIDTH] IN BLOOD BY AUTOMATED COUNT: 15.5 % (ref 12.3–15.4)
GLUCOSE SERPL-MCNC: 126 MG/DL (ref 65–99)
HCT VFR BLD AUTO: 29.5 % (ref 34–46.6)
HGB BLD-MCNC: 8.8 G/DL (ref 12–15.9)
IMM GRANULOCYTES # BLD AUTO: 0.31 10*3/MM3 (ref 0–0.05)
IMM GRANULOCYTES NFR BLD AUTO: 2.7 % (ref 0–0.5)
LYMPHOCYTES # BLD AUTO: 1.75 10*3/MM3 (ref 0.7–3.1)
LYMPHOCYTES NFR BLD AUTO: 15.4 % (ref 19.6–45.3)
MCH RBC QN AUTO: 26.4 PG (ref 26.6–33)
MCHC RBC AUTO-ENTMCNC: 29.8 G/DL (ref 31.5–35.7)
MCV RBC AUTO: 88.6 FL (ref 79–97)
MONOCYTES # BLD AUTO: 0.93 10*3/MM3 (ref 0.1–0.9)
MONOCYTES NFR BLD AUTO: 8.2 % (ref 5–12)
NEUTROPHILS NFR BLD AUTO: 71 % (ref 42.7–76)
NEUTROPHILS NFR BLD AUTO: 8.09 10*3/MM3 (ref 1.7–7)
NRBC BLD AUTO-RTO: 0 /100 WBC (ref 0–0.2)
PLATELET # BLD AUTO: 274 10*3/MM3 (ref 140–450)
PMV BLD AUTO: 11.5 FL (ref 6–12)
POTASSIUM SERPL-SCNC: 3.8 MMOL/L (ref 3.5–5.2)
PROCALCITONIN SERPL-MCNC: 1.9 NG/ML (ref 0–0.25)
RBC # BLD AUTO: 3.33 10*6/MM3 (ref 3.77–5.28)
SODIUM SERPL-SCNC: 143 MMOL/L (ref 136–145)
WBC NRBC COR # BLD AUTO: 11.38 10*3/MM3 (ref 3.4–10.8)

## 2024-12-07 PROCEDURE — 80048 BASIC METABOLIC PNL TOTAL CA: CPT

## 2024-12-07 PROCEDURE — 25010000002 VANCOMYCIN 5 G RECONSTITUTED SOLUTION: Performed by: SURGERY

## 2024-12-07 PROCEDURE — 99232 SBSQ HOSP IP/OBS MODERATE 35: CPT | Performed by: NURSE PRACTITIONER

## 2024-12-07 PROCEDURE — 71045 X-RAY EXAM CHEST 1 VIEW: CPT | Performed by: RADIOLOGY

## 2024-12-07 PROCEDURE — 85025 COMPLETE CBC W/AUTO DIFF WBC: CPT

## 2024-12-07 PROCEDURE — 86140 C-REACTIVE PROTEIN: CPT

## 2024-12-07 PROCEDURE — 84145 PROCALCITONIN (PCT): CPT

## 2024-12-07 PROCEDURE — 25810000003 SODIUM CHLORIDE 0.9 % SOLUTION: Performed by: SURGERY

## 2024-12-07 PROCEDURE — 99231 SBSQ HOSP IP/OBS SF/LOW 25: CPT

## 2024-12-07 PROCEDURE — 25010000002 CEFTRIAXONE PER 250 MG: Performed by: SURGERY

## 2024-12-07 PROCEDURE — 25010000002 HEPARIN (PORCINE) PER 1000 UNITS: Performed by: SURGERY

## 2024-12-07 PROCEDURE — 71045 X-RAY EXAM CHEST 1 VIEW: CPT

## 2024-12-07 RX ADMIN — HEPARIN SODIUM 5000 UNITS: 5000 INJECTION INTRAVENOUS; SUBCUTANEOUS at 09:39

## 2024-12-07 RX ADMIN — VANCOMYCIN HYDROCHLORIDE 750 MG: 5 INJECTION, POWDER, LYOPHILIZED, FOR SOLUTION INTRAVENOUS at 22:44

## 2024-12-07 RX ADMIN — ERYTHROMYCIN 1 APPLICATION: 5 OINTMENT OPHTHALMIC at 22:38

## 2024-12-07 RX ADMIN — Medication 10 ML: at 09:39

## 2024-12-07 RX ADMIN — SODIUM CHLORIDE 2000 MG: 9 INJECTION, SOLUTION INTRAVENOUS at 12:26

## 2024-12-07 RX ADMIN — Medication 220 MG: at 22:34

## 2024-12-07 RX ADMIN — SERTRALINE HYDROCHLORIDE 100 MG: 50 TABLET ORAL at 09:39

## 2024-12-07 RX ADMIN — OXYCODONE HYDROCHLORIDE AND ACETAMINOPHEN 500 MG: 500 TABLET ORAL at 09:38

## 2024-12-07 RX ADMIN — MINERAL OIL, PETROLATUM 1 APPLICATION: 425; 568 OINTMENT OPHTHALMIC at 22:35

## 2024-12-07 RX ADMIN — OXYCODONE HYDROCHLORIDE AND ACETAMINOPHEN 500 MG: 500 TABLET ORAL at 22:35

## 2024-12-07 RX ADMIN — LINAGLIPTIN 5 MG: 5 TABLET, FILM COATED ORAL at 09:39

## 2024-12-07 RX ADMIN — DAKIN'S SOLUTION 0.125% (QUARTER STRENGTH): 0.12 SOLUTION at 09:39

## 2024-12-07 RX ADMIN — Medication 10 ML: at 22:38

## 2024-12-07 RX ADMIN — DOXYCYCLINE 100 MG: 100 INJECTION, POWDER, LYOPHILIZED, FOR SOLUTION INTRAVENOUS at 15:03

## 2024-12-07 RX ADMIN — CARBOXYMETHYLCELLULOSE SODIUM 2 DROP: 5 SOLUTION/ DROPS OPHTHALMIC at 22:34

## 2024-12-07 RX ADMIN — CARBOXYMETHYLCELLULOSE SODIUM 2 DROP: 5 SOLUTION/ DROPS OPHTHALMIC at 09:39

## 2024-12-07 RX ADMIN — COLLAGENASE SANTYL 1 APPLICATION: 250 OINTMENT TOPICAL at 22:35

## 2024-12-07 RX ADMIN — DAKIN'S SOLUTION 0.125% (QUARTER STRENGTH): 0.12 SOLUTION at 22:35

## 2024-12-07 RX ADMIN — HEPARIN SODIUM 5000 UNITS: 5000 INJECTION INTRAVENOUS; SUBCUTANEOUS at 22:34

## 2024-12-07 RX ADMIN — CARBOXYMETHYLCELLULOSE SODIUM 2 DROP: 5 SOLUTION/ DROPS OPHTHALMIC at 15:03

## 2024-12-07 RX ADMIN — Medication 220 MG: at 09:38

## 2024-12-07 NOTE — PROGRESS NOTES
PROGRESS NOTE         Patient Identification:  Name:  Tsering Alberto  Age:  89 y.o.  Sex:  female  :  1935  MRN:  [id number]  Visit Number:  [id number]  Primary Care Provider:  Nicolette Colbert MD         LOS: 4 days       ----------------------------------------------------------------------------------------------------------------------  Subjective       Chief Complaints:    Wound Check        Interval History:      Patient resting in bed.  Primary RN at bedside.  On slightly increased oxygen requirement of 3 L per nasal cannula.  Diminished lung sounds bilaterally.  Abdomen soft, nontender.  Afebrile, no reported diarrhea.  WBC slightly worsened 11.38.  Blood cultures from 2024 show no growth.  Chest x-ray from 2024 reports worsening left basilar opacity which may reflect atelectasis or pneumonia.    Review of Systems:    Unable to obtain due to baseline dementia  ----------------------------------------------------------------------------------------------------------------------      Objective       Current Hospital Meds:  amLODIPine, 5 mg, Oral, Daily  ascorbic acid, 500 mg, Oral, BID  carboxymethylcellulose, 2 drop, Both Eyes, TID  cefTRIAXone, 2,000 mg, Intravenous, Q24H  collagenase, 1 Application, Topical, Nightly  doxycycline, 100 mg, Intravenous, Q12H  erythromycin, 1 Application, Both Eyes, Nightly  heparin (porcine), 5,000 Units, Subcutaneous, Q12H  linagliptin, 5 mg, Oral, Daily  [Held by provider] metoprolol tartrate, 25 mg, Oral, BID  sertraline, 100 mg, Oral, Daily  sodium chloride, 10 mL, Intravenous, Q12H  sodium chloride, 10 mL, Intravenous, Q12H  sodium hypochlorite, , Topical, BID  vancomycin, 750 mg, Intravenous, Q24H  zinc sulfate, 220 mg, Oral, BID         ----------------------------------------------------------------------------------------------------------------------    Vital Signs:  Temp:  [97.7 °F (36.5 °C)-98.5 °F (36.9 °C)] 97.9 °F (36.6  °C)  Heart Rate:  [67-82] 82  Resp:  [18] 18  BP: ()/(40-60) 122/49  Mean Arterial Pressure (Non-Invasive) for the past 24 hrs (Last 3 readings):   Noninvasive MAP (mmHg)   12/07/24 1030 68   12/07/24 0715 83   12/07/24 0250 62     SpO2 Percentage    12/06/24 1100 12/06/24 1500 12/07/24 1030   SpO2: 95% 91% 93%     SpO2:  [91 %-93 %] 93 %  on  Flow (L/min) (Oxygen Therapy):  [3] 3;   Device (Oxygen Therapy): nasal cannula    Body mass index is 22 kg/m².  Wt Readings from Last 3 Encounters:   12/07/24 69.5 kg (153 lb 4.8 oz)   11/09/20 94.8 kg (209 lb)   10/05/18 91.6 kg (202 lb)        Intake/Output Summary (Last 24 hours) at 12/7/2024 1300  Last data filed at 12/7/2024 1030  Gross per 24 hour   Intake 240 ml   Output 1500 ml   Net -1260 ml     Diet: Regular/House; Fluid Consistency: Thin (IDDSI 0)  ----------------------------------------------------------------------------------------------------------------------      Physical Exam:    Constitutional: Elderly, chronically ill  female.  Currently on 3 L per nasal cannula.   HENT:  Head: Normocephalic and atraumatic.  Mouth:  Moist mucous membranes.    Eyes:  Conjunctivae and EOM are normal.  No scleral icterus.  Neck:  Neck supple.  No JVD present.    Cardiovascular:  Normal rate, regular rhythm.  4/6 murmur. no edema.  Pulmonary/Chest: Clear but diminished lung sounds bilaterally.  No respiratory distress, no wheezes, no crackles, with normal breath sounds and good air movement.  Abdominal:  Soft.  Bowel sounds are normal.  No distension and no tenderness.   Musculoskeletal:  No edema, no tenderness, and no deformity.  No swelling or redness of joints.  Neurological: Refused.  Baseline dementia.  Skin:  Skin is warm and dry.  No rash noted.  No pallor.  Unstageable sacral decubitus ulcer.  No surrounding cellulitis.  Psychiatric:  Normal mood and affect.  Behavior is  "normal.        ----------------------------------------------------------------------------------------------------------------------          Results from last 7 days   Lab Units 12/06/24  1903   PH, ARTERIAL pH units 7.390   PO2 ART mm Hg 60.1*   PCO2, ARTERIAL mm Hg 40.4   HCO3 ART mmol/L 24.4     Results from last 7 days   Lab Units 12/07/24  1222 12/07/24 0229 12/05/24 0031 12/04/24 0853 12/04/24 0043 12/03/24 1857 12/03/24  1526   CRP mg/dL 13.66*  --   --   --   --   --   --    LACTATE mmol/L  --   --   --   --   --  1.6 3.4*   WBC 10*3/mm3  --  11.38* 10.42 13.27*   < >  --  15.17*   HEMOGLOBIN g/dL  --  8.8* 10.1* 10.8*   < >  --  11.8*   HEMATOCRIT %  --  29.5* 32.7* 35.6   < >  --  38.0   MCV fL  --  88.6 87.2 89.9   < >  --  86.8   MCHC g/dL  --  29.8* 30.9* 30.3*   < >  --  31.1*   PLATELETS 10*3/mm3  --  274 228 224   < >  --  285   INR   --   --   --   --   --   --  1.82*    < > = values in this interval not displayed.     Results from last 7 days   Lab Units 12/07/24 0229 12/05/24 0031 12/04/24 2014 12/04/24  0853 12/03/24  1526   SODIUM mmol/L 143 143  --  142 140   POTASSIUM mmol/L 3.8 3.4* 3.4* 3.2* 3.8   MAGNESIUM mg/dL  --  2.3  --  1.3*  --    CHLORIDE mmol/L 112* 111*  --  110* 100   CO2 mmol/L 22.8 20.9*  --  20.6* 25.2   BUN mg/dL 23 45*  --  53* 50*   CREATININE mg/dL 0.72 0.89  --  1.01* 1.00   CALCIUM mg/dL 7.8* 7.3*  --  7.6* 8.5*   GLUCOSE mg/dL 126* 193*  --  125* 189*   ALBUMIN g/dL  --   --   --   --  3.2*   BILIRUBIN mg/dL  --   --   --   --  0.7   ALK PHOS U/L  --   --   --   --  111   AST (SGOT) U/L  --   --   --   --  20   ALT (SGPT) U/L  --   --   --   --  11   Estimated Creatinine Clearance: 59.3 mL/min (by C-G formula based on SCr of 0.72 mg/dL).  No results found for: \"AMMONIA\"    Glucose   Date/Time Value Ref Range Status   12/05/2024 1314 143 (H) 70 - 130 mg/dL Final     No results found for: \"HGBA1C\"  No results found for: \"TSH\", \"FREET4\"    Blood Culture   Date " "Value Ref Range Status   12/04/2024 No growth at 24 hours  Preliminary   12/04/2024 No growth at 24 hours  Preliminary   12/03/2024 Streptococcus agalactiae (Group B) (C)  Preliminary   12/03/2024 Streptococcus agalactiae (Group B) (C)  Preliminary     Urine Culture   Date Value Ref Range Status   12/03/2024 >100,000 CFU/mL Gram Negative Bacilli (A)  Preliminary     Wound Culture   Date Value Ref Range Status   12/03/2024 Light growth (2+) Escherichia coli (A)  Preliminary   12/03/2024 Rare growth Proteus mirabilis (A)  Preliminary   12/03/2024 Light growth (2+) Gram Positive Cocci (A)  Preliminary     No results found for: \"STOOLCX\"  No results found for: \"RESPCX\"  Pain Management Panel           No data to display                  ----------------------------------------------------------------------------------------------------------------------  Imaging Results (Last 24 Hours)       Procedure Component Value Units Date/Time    XR Chest 1 View [[id number]] Collected: 12/07/24 1059     Updated: 12/07/24 1102    Narrative:      PROCEDURE: XR CHEST 1 VW-       HISTORY: increasing o2 requirement; A41.9-Sepsis, unspecified organism;  L89.95-Pressure ulcer of unspecified site, unstageable; N39.0-Urinary  tract infection, site not specified     COMPARISON: 12/3/2024.     FINDINGS: A right PICC is in place with the tip in the SVC. The the  patient is status post median sternotomy and mitral valve repair. Heart  is normal in size. The mediastinum is unremarkable. There is a worsening  left basilar opacity. The right lung is clear. There is no pneumothorax.  There are no acute osseous abnormalities.       Impression:      Worsening left basilar opacity which may reflect atelectasis  or pneumonia.        This report was finalized on 12/7/2024 11:00 AM by Diana Ozuna M.D..               ----------------------------------------------------------------------------------------------------------------------    Pertinent " Infectious Disease Results                Assessment/Plan       Assessment       Group B strep bacteremia  Osteomyelitis of the coccyx  UTI      Plan      Patient resting in bed.  Primary RN at bedside.  On slightly increased oxygen requirement of 3 L per nasal cannula.  Diminished lung sounds bilaterally.  Abdomen soft, nontender.  Afebrile, no reported diarrhea.  WBC slightly worsened 11.38.  Blood cultures from 12/4/2024 show no growth.  Chest x-ray from 12/7/2024 reports worsening left basilar opacity which may reflect atelectasis or pneumonia.    Status post sacral wound debridement on 12/6/2024.  Blood cultures from 12/4/2024 show no growth thus far.    Wound culture of the coccyx preliminary reports growth of E. coli, Proteus and gram-positive cocci.  Urine culture from 12/3/2024 preliminary reports greater than 100,000 colonies of gram-negative bacilli.    MRI of the pelvis reported findings consistent with osteomyelitis of the coccyx.  Pending finalization of urine culture and wound culture results along with Streptococcus bacteremia will continue vancomycin pharmacy to dose and ceftriaxone 2 g IV every 24 hours.  Recommend intraoperative wound cultures.  PICC line consult ordered.  Patient will require 6 weeks of IV antibiotic therapy for treatment of osteomyelitis through 1/15/2025.      Doxycycline 100 mg IV every 12 hours was initiated by primary team due to worsening oxygen requirements and developing pneumonia on chest x-ray, agree with this initiation.  We will continue to follow closely and adjust antibiotic therapy as needed.      ANTIMICROBIAL THERAPY    cefTRIAXone (ROCEPHIN) 2000 mg IVPB in 100 mL NS (VTB)  doxycycline (VIBRAMYCIN) 100 mg IVPB in 100 mL NS (VTB)  vancomycin     Code Status:   Code Status and Medical Interventions: CPR (Attempt to Resuscitate); Full Support   Ordered at: 12/04/24 0897     Code Status (Patient has no pulse and is not breathing):    CPR (Attempt to Resuscitate)      Medical Interventions (Patient has pulse or is breathing):    Full Support       ROBBI Schaeffer  12/07/24  13:00 EST

## 2024-12-07 NOTE — PLAN OF CARE
Pt has been resting in bed this shift. No s/s of acute distress noted and plan of care is ongoing at this time.

## 2024-12-07 NOTE — PROGRESS NOTES
Patient Identification:  Name:  Tsering Alberto  Age:  89 y.o.  Sex:  female  :  1935  MRN:  [id number]  Visit Number:  [id number]  Primary Care Provider:  Nicolette Colbert MD    Length of stay:  4    Subjective/Interval History/Consultants/Procedures     Chief Complaint:   Chief Complaint   Patient presents with    Wound Check       Subjective/Interval History:    89 y.o. female who was admitted on 12/3/2024 with UTI, sacral wound     PMH is significant for hypertension, diabetes mellitus, HFpEF, CAD, Hx mitral valve replacement, dementia   For complete admission information, please see history and physical.     Consultations:  Infectious disease  CM/SW  General surgery   PT OT    Procedures/Scans:  CXR  MRI pelvis without contrast  Debridement of sacral ulcer, 2024     Today, the patient was seen and examined w/ no family at bedside. Initially very drowsy but able to wake and converse some. She denied any complaint today including any pain, chest discomfort, or shortness of breath. Notably O2 requirement increased overnight, currently on 3L NC w/out increased work of breathing noted on exam.      Room location at the time of evaluation was 325p.    ----------------------------------------------------------------------------------------------------------------------  [facility] Meds:  amLODIPine, 5 mg, Oral, Daily  ascorbic acid, 500 mg, Oral, BID  carboxymethylcellulose, 2 drop, Both Eyes, TID  cefTRIAXone, 2,000 mg, Intravenous, Q24H  collagenase, 1 Application, Topical, Nightly  erythromycin, 1 Application, Both Eyes, Nightly  heparin (porcine), 5,000 Units, Subcutaneous, Q12H  linagliptin, 5 mg, Oral, Daily  [Held by provider] metoprolol tartrate, 25 mg, Oral, BID  sertraline, 100 mg, Oral, Daily  sodium chloride, 10 mL, Intravenous, Q12H  sodium chloride, 10 mL, Intravenous, Q12H  sodium hypochlorite, , Topical, BID  vancomycin, 750 mg, Intravenous, Q24H  zinc sulfate, 220 mg, Oral,  BID         ----------------------------------------------------------------------------------------------------------------------      Objective     Vital Signs:  Temp:  [97.7 °F (36.5 °C)-98.5 °F (36.9 °C)] 97.7 °F (36.5 °C)  Heart Rate:  [67-86] 67  Resp:  [18] 18  BP: ()/(40-60) 117/47      12/05/24  0531 12/06/24  0500 12/07/24  0500   Weight: 69.2 kg (152 lb 8 oz) 69.8 kg (153 lb 12.8 oz) 69.5 kg (153 lb 4.8 oz)     Body mass index is 22 kg/m².    Intake/Output Summary (Last 24 hours) at 12/7/2024 0956  Last data filed at 12/7/2024 0700  Gross per 24 hour   Intake 360 ml   Output 1200 ml   Net -840 ml     No intake/output data recorded.  Diet: Regular/House; Fluid Consistency: Thin (IDDSI 0)  ----------------------------------------------------------------------------------------------------------------------    Physical Exam  Vitals and nursing note reviewed.   Constitutional:       General: She is not in acute distress.  HENT:      Head: Normocephalic and atraumatic.      Mouth/Throat:      Mouth: Mucous membranes are dry.   Eyes:      Extraocular Movements: Extraocular movements intact.   Cardiovascular:      Rate and Rhythm: Normal rate.   Pulmonary:      Effort: Pulmonary effort is normal. No respiratory distress.   Skin:     General: Skin is warm and dry.   Neurological:      Mental Status: She is alert. Mental status is at baseline.   Psychiatric:         Mood and Affect: Mood normal.         Behavior: Behavior normal.          ----------------------------------------------------------------------------------------------------------------------  Tele:      ----------------------------------------------------------------------------------------------------------------------      Results from last 7 days   Lab Units 12/07/24  0229 12/05/24  0031 12/04/24  0853 12/04/24  0043 12/03/24  1857 12/03/24  1526   LACTATE mmol/L  --   --   --   --  1.6 3.4*   WBC 10*3/mm3 11.38* 10.42 13.27*   < >  --   "15.17*   HEMOGLOBIN g/dL 8.8* 10.1* 10.8*   < >  --  11.8*   HEMATOCRIT % 29.5* 32.7* 35.6   < >  --  38.0   MCV fL 88.6 87.2 89.9   < >  --  86.8   MCHC g/dL 29.8* 30.9* 30.3*   < >  --  31.1*   PLATELETS 10*3/mm3 274 228 224   < >  --  285   INR   --   --   --   --   --  1.82*    < > = values in this interval not displayed.     Results from last 7 days   Lab Units 12/06/24  1903   PH, ARTERIAL pH units 7.390   PO2 ART mm Hg 60.1*   PCO2, ARTERIAL mm Hg 40.4   HCO3 ART mmol/L 24.4     Results from last 7 days   Lab Units 12/07/24  0229 12/05/24  0031 12/04/24  2014 12/04/24  0853 12/03/24  1526   SODIUM mmol/L 143 143  --  142 140   POTASSIUM mmol/L 3.8 3.4* 3.4* 3.2* 3.8   MAGNESIUM mg/dL  --  2.3  --  1.3*  --    CHLORIDE mmol/L 112* 111*  --  110* 100   CO2 mmol/L 22.8 20.9*  --  20.6* 25.2   BUN mg/dL 23 45*  --  53* 50*   CREATININE mg/dL 0.72 0.89  --  1.01* 1.00   CALCIUM mg/dL 7.8* 7.3*  --  7.6* 8.5*   GLUCOSE mg/dL 126* 193*  --  125* 189*   ALBUMIN g/dL  --   --   --   --  3.2*   BILIRUBIN mg/dL  --   --   --   --  0.7   ALK PHOS U/L  --   --   --   --  111   AST (SGOT) U/L  --   --   --   --  20   ALT (SGPT) U/L  --   --   --   --  11   Estimated Creatinine Clearance: 59.3 mL/min (by C-G formula based on SCr of 0.72 mg/dL).  No results found for: \"AMMONIA\"      Blood Culture   Date Value Ref Range Status   12/04/2024 No growth at 3 days  Preliminary   12/04/2024 No growth at 3 days  Preliminary   12/03/2024 Streptococcus agalactiae (Group B) (C)  Final   12/03/2024 Streptococcus agalactiae (Group B) (C)  Final     Urine Culture   Date Value Ref Range Status   12/03/2024 >100,000 CFU/mL Escherichia coli (A)  Final   12/03/2024 >100,000 CFU/mL Proteus mirabilis (A)  Final     Wound Culture   Date Value Ref Range Status   12/03/2024 Light growth (2+) Escherichia coli (A)  Preliminary   12/03/2024 Rare growth Proteus mirabilis (A)  Preliminary   12/03/2024 Light growth (2+) Gram Positive Cocci (A)  " "Preliminary     No results found for: \"STOOLCX\"  ----------------------------------------------------------------------------------------------------------------------  Imaging Results (Last 24 Hours)       ** No results found for the last 24 hours. **          ----------------------------------------------------------------------------------------------------------------------   I have reviewed the above laboratory values for 12/07/24    Assessment/Plan     Active Hospital Problems    Diagnosis  POA    **Sepsis [A41.9]  Yes    Pressure injury, unstageable [L89.95]  Unknown         ASSESSMENT/PLAN:    Severe sepsis, POA  Group B strep bacteremia  Acute urinary tract infection  Infected decubitus ulcer of coccyx with underlying osteomyelitis   Left basilar pneumonia  Patient was sent to the ED from local [facility] secondary to fever and worsening appearance of sacral decubitus ulcer.  On workup in the ED she did meet severe sepsis criteria with tachycardia, leukocytosis, fever up to 102 and lactate greater than 3.  Pro-Merritt was 0.96.  She was given Merrem and vancomycin in the ED and started on maintenance normal saline at 100 mL/h  Given history of drug-resistant organisms we have consulted infectious disease for further assistance and recommendations, appreciated.Urine culture grew E. coli and Proteus, Wound culture is growing e coli, proteus, and gram positive cocci preliminarily.  Blood cultures did not grow group B strep.  Repeat blood cultures remain without growth to date.  Wound was further evaluated with MRI of the pelvis which identified findings consistent with osteomyelitis of the coccyx.  Rocephin and vancomycin have been continued for now with plan to continue through 1/15/2025.   PICC line was placed 12/6.  Patient with increasing O2 requirement throughout admission- repeated CXR today which is concerning for left lower lobe pneumonia. As WBC is trending upward will add doxycycline for atypical coverage. F/u " CRP and procal  General surgery also consulted and following- patient underwent surgical debridement 12/6/24 and tolerated well.   O2 requirement increasing postoperatively-repeat CXR and ABG pending today.  Will follow-up.  Will continue supportive care measures and close monitoring.  Trend labs  CM/SW assisting with discharge planning. PT OT to see and evaluate as well. Patient presented from [facility]-family would prefer the [facility] and patient has been placed on waiting list.  Patient's son is okay with discharge to [facility] once clinically stable.     Hypomagnesemia  Hypokalemia   Monitor and replace electrolytes as needed per protocol     Arthritis  HTN  HLD  Diabetes mellitus   VSS- metoprolol tartrate held currently  Home regimen resumed as indicated  Plan to add SSI if needed    -----------  -DVT prophylaxis: [facility]  -Disposition plans/anticipated needs: Pending course, anticipate discharge to SNF once medically stable. Likely in the next 24-48 hours.        The patient is high risk due to the following diagnoses/reasons:  bacteremia, osteomyelitis, UTI, PNA        José Luis Beckett PA-C  12/07/24  09:56 EST

## 2024-12-08 LAB
BACTERIA SPEC AEROBE CULT: ABNORMAL
DEPRECATED RDW RBC AUTO: 49.7 FL (ref 37–54)
EOSINOPHIL # BLD MANUAL: 0.36 10*3/MM3 (ref 0–0.4)
EOSINOPHIL NFR BLD MANUAL: 3 % (ref 0.3–6.2)
ERYTHROCYTE [DISTWIDTH] IN BLOOD BY AUTOMATED COUNT: 15.6 % (ref 12.3–15.4)
GRAM STN SPEC: ABNORMAL
HCT VFR BLD AUTO: 30.8 % (ref 34–46.6)
HGB BLD-MCNC: 9.3 G/DL (ref 12–15.9)
HYPOCHROMIA BLD QL: ABNORMAL
LYMPHOCYTES # BLD MANUAL: 2.43 10*3/MM3 (ref 0.7–3.1)
LYMPHOCYTES NFR BLD MANUAL: 1 % (ref 5–12)
MACROCYTES BLD QL SMEAR: ABNORMAL
MCH RBC QN AUTO: 26.3 PG (ref 26.6–33)
MCHC RBC AUTO-ENTMCNC: 30.2 G/DL (ref 31.5–35.7)
MCV RBC AUTO: 87 FL (ref 79–97)
METAMYELOCYTES NFR BLD MANUAL: 3 % (ref 0–0)
MONOCYTES # BLD: 0.12 10*3/MM3 (ref 0.1–0.9)
NEUTROPHILS # BLD AUTO: 8.86 10*3/MM3 (ref 1.7–7)
NEUTROPHILS NFR BLD MANUAL: 71 % (ref 42.7–76)
NEUTS BAND NFR BLD MANUAL: 2 % (ref 0–5)
PLAT MORPH BLD: NORMAL
PLATELET # BLD AUTO: 295 10*3/MM3 (ref 140–450)
PMV BLD AUTO: 10.7 FL (ref 6–12)
RBC # BLD AUTO: 3.54 10*6/MM3 (ref 3.77–5.28)
VANCOMYCIN TROUGH SERPL-MCNC: 18.6 MCG/ML (ref 5–20)
VARIANT LYMPHS NFR BLD MANUAL: 20 % (ref 19.6–45.3)
WBC NRBC COR # BLD AUTO: 12.14 10*3/MM3 (ref 3.4–10.8)

## 2024-12-08 PROCEDURE — 99232 SBSQ HOSP IP/OBS MODERATE 35: CPT

## 2024-12-08 PROCEDURE — 85007 BL SMEAR W/DIFF WBC COUNT: CPT

## 2024-12-08 PROCEDURE — 25810000003 SODIUM CHLORIDE 0.9 % SOLUTION: Performed by: SURGERY

## 2024-12-08 PROCEDURE — 25010000002 VANCOMYCIN 5 G RECONSTITUTED SOLUTION: Performed by: SURGERY

## 2024-12-08 PROCEDURE — 25010000002 HEPARIN (PORCINE) PER 1000 UNITS: Performed by: SURGERY

## 2024-12-08 PROCEDURE — 80202 ASSAY OF VANCOMYCIN: CPT

## 2024-12-08 PROCEDURE — 85025 COMPLETE CBC W/AUTO DIFF WBC: CPT

## 2024-12-08 PROCEDURE — 99232 SBSQ HOSP IP/OBS MODERATE 35: CPT | Performed by: NURSE PRACTITIONER

## 2024-12-08 PROCEDURE — 25010000002 CEFTRIAXONE PER 250 MG: Performed by: SURGERY

## 2024-12-08 RX ADMIN — AMLODIPINE BESYLATE 5 MG: 5 TABLET ORAL at 09:05

## 2024-12-08 RX ADMIN — DAKIN'S SOLUTION 0.125% (QUARTER STRENGTH): 0.12 SOLUTION at 09:06

## 2024-12-08 RX ADMIN — CARBOXYMETHYLCELLULOSE SODIUM 2 DROP: 5 SOLUTION/ DROPS OPHTHALMIC at 22:11

## 2024-12-08 RX ADMIN — HYDROCODONE BITARTRATE AND ACETAMINOPHEN 1 TABLET: 5; 325 TABLET ORAL at 09:05

## 2024-12-08 RX ADMIN — Medication 10 ML: at 09:07

## 2024-12-08 RX ADMIN — OXYCODONE HYDROCHLORIDE AND ACETAMINOPHEN 500 MG: 500 TABLET ORAL at 22:04

## 2024-12-08 RX ADMIN — CARBOXYMETHYLCELLULOSE SODIUM 2 DROP: 5 SOLUTION/ DROPS OPHTHALMIC at 14:22

## 2024-12-08 RX ADMIN — Medication 10 ML: at 22:10

## 2024-12-08 RX ADMIN — SERTRALINE HYDROCHLORIDE 100 MG: 50 TABLET ORAL at 09:05

## 2024-12-08 RX ADMIN — SODIUM CHLORIDE 2000 MG: 9 INJECTION, SOLUTION INTRAVENOUS at 11:00

## 2024-12-08 RX ADMIN — HEPARIN SODIUM 5000 UNITS: 5000 INJECTION INTRAVENOUS; SUBCUTANEOUS at 09:05

## 2024-12-08 RX ADMIN — LINAGLIPTIN 5 MG: 5 TABLET, FILM COATED ORAL at 09:05

## 2024-12-08 RX ADMIN — VANCOMYCIN HYDROCHLORIDE 750 MG: 5 INJECTION, POWDER, LYOPHILIZED, FOR SOLUTION INTRAVENOUS at 22:04

## 2024-12-08 RX ADMIN — Medication 220 MG: at 22:04

## 2024-12-08 RX ADMIN — DAKIN'S SOLUTION 0.125% (QUARTER STRENGTH): 0.12 SOLUTION at 22:05

## 2024-12-08 RX ADMIN — DOXYCYCLINE 100 MG: 100 INJECTION, POWDER, LYOPHILIZED, FOR SOLUTION INTRAVENOUS at 12:08

## 2024-12-08 RX ADMIN — OXYCODONE HYDROCHLORIDE AND ACETAMINOPHEN 500 MG: 500 TABLET ORAL at 09:06

## 2024-12-08 RX ADMIN — Medication 10 ML: at 09:06

## 2024-12-08 RX ADMIN — MINERAL OIL, PETROLATUM 1 APPLICATION: 425; 568 OINTMENT OPHTHALMIC at 22:05

## 2024-12-08 RX ADMIN — CARBOXYMETHYLCELLULOSE SODIUM 2 DROP: 5 SOLUTION/ DROPS OPHTHALMIC at 09:06

## 2024-12-08 RX ADMIN — DOXYCYCLINE 100 MG: 100 INJECTION, POWDER, LYOPHILIZED, FOR SOLUTION INTRAVENOUS at 04:13

## 2024-12-08 RX ADMIN — Medication 220 MG: at 09:06

## 2024-12-08 RX ADMIN — COLLAGENASE SANTYL 1 APPLICATION: 250 OINTMENT TOPICAL at 22:05

## 2024-12-08 RX ADMIN — ERYTHROMYCIN 1 APPLICATION: 5 OINTMENT OPHTHALMIC at 22:05

## 2024-12-08 RX ADMIN — HEPARIN SODIUM 5000 UNITS: 5000 INJECTION INTRAVENOUS; SUBCUTANEOUS at 22:04

## 2024-12-08 NOTE — PROGRESS NOTES
PROGRESS NOTE         Patient Identification:  Name:  Tsering Alberto  Age:  89 y.o.  Sex:  female  :  1935  MRN:  [id number]  Visit Number:  [id number]  Primary Care Provider:  Nicolette Colbert MD         LOS: 5 days       ----------------------------------------------------------------------------------------------------------------------  Subjective       Chief Complaints:    Wound Check        Interval History:      Resting comfortably in bed this morning.  No issues or complaints.  Continues on 3 L per nasal cannula with no apparent distress.  Lung sounds diminished bilaterally.  Abdomen soft, nontender.  Afebrile, denies diarrhea.  WBC slightly elevated 12.14.  Procalcitonin 1.90.    Review of Systems:    Unable to obtain due to baseline dementia  ----------------------------------------------------------------------------------------------------------------------      Objective       Current Hospital Meds:  amLODIPine, 5 mg, Oral, Daily  ascorbic acid, 500 mg, Oral, BID  carboxymethylcellulose, 2 drop, Both Eyes, TID  cefTRIAXone, 2,000 mg, Intravenous, Q24H  collagenase, 1 Application, Topical, Nightly  doxycycline, 100 mg, Intravenous, Q12H  erythromycin, 1 Application, Both Eyes, Nightly  heparin (porcine), 5,000 Units, Subcutaneous, Q12H  linagliptin, 5 mg, Oral, Daily  [Held by provider] metoprolol tartrate, 25 mg, Oral, BID  sertraline, 100 mg, Oral, Daily  sodium chloride, 10 mL, Intravenous, Q12H  sodium chloride, 10 mL, Intravenous, Q12H  sodium hypochlorite, , Topical, BID  vancomycin, 750 mg, Intravenous, Q24H  zinc sulfate, 220 mg, Oral, BID         ----------------------------------------------------------------------------------------------------------------------    Vital Signs:  Temp:  [97.7 °F (36.5 °C)-98.3 °F (36.8 °C)] 97.7 °F (36.5 °C)  Heart Rate:  [68-82] 73  Resp:  [18-20] 20  BP: (113-144)/(43-93) 136/93  Mean Arterial Pressure (Non-Invasive) for the past 24  hrs (Last 3 readings):   Noninvasive MAP (mmHg)   12/08/24 1146 111   12/08/24 0731 93   12/08/24 0400 96     SpO2 Percentage    12/06/24 1500 12/07/24 1030 12/08/24 1146   SpO2: 91% 93% 96%     SpO2:  [96 %] 96 %  on  Flow (L/min) (Oxygen Therapy):  [3] 3;   Device (Oxygen Therapy): nasal cannula    Body mass index is 22.05 kg/m².  Wt Readings from Last 3 Encounters:   12/08/24 69.7 kg (153 lb 10.6 oz)   11/09/20 94.8 kg (209 lb)   10/05/18 91.6 kg (202 lb)        Intake/Output Summary (Last 24 hours) at 12/8/2024 1309  Last data filed at 12/8/2024 0943  Gross per 24 hour   Intake 480 ml   Output 550 ml   Net -70 ml     Diet: Regular/House; Fluid Consistency: Thin (IDDSI 0)  ----------------------------------------------------------------------------------------------------------------------      Physical Exam:    Constitutional: Elderly, chronically ill  female.  Currently on 3 L per nasal cannula.  No complaints.  HENT:  Head: Normocephalic and atraumatic.  Mouth:  Moist mucous membranes.    Eyes:  Conjunctivae and EOM are normal.  No scleral icterus.  Neck:  Neck supple.  No JVD present.    Cardiovascular:  Normal rate, regular rhythm.  4/6 murmur. no edema.  Pulmonary/Chest: Clear but diminished lung sounds bilaterally.  No respiratory distress, no wheezes, no crackles, with normal breath sounds and good air movement.  Abdominal:  Soft.  Bowel sounds are normal.  No distension and no tenderness.   Musculoskeletal:  No edema, no tenderness, and no deformity.  No swelling or redness of joints.  Neurological: Refused.  Baseline dementia.  Skin:  Skin is warm and dry.  No rash noted.  No pallor.  Unstageable sacral decubitus ulcer.  No surrounding cellulitis.  Psychiatric:  Normal mood and affect.  Behavior is normal.        ----------------------------------------------------------------------------------------------------------------------          Results from last 7 days   Lab Units 12/06/24  0756  "  PH, ARTERIAL pH units 7.390   PO2 ART mm Hg 60.1*   PCO2, ARTERIAL mm Hg 40.4   HCO3 ART mmol/L 24.4     Results from last 7 days   Lab Units 12/08/24  0209 12/07/24  1222 12/07/24  0229 12/05/24  0031 12/04/24  0043 12/03/24  1857 12/03/24  1526   CRP mg/dL  --  13.66*  --   --   --   --   --    LACTATE mmol/L  --   --   --   --   --  1.6 3.4*   WBC 10*3/mm3 12.14*  --  11.38* 10.42   < >  --  15.17*   HEMOGLOBIN g/dL 9.3*  --  8.8* 10.1*   < >  --  11.8*   HEMATOCRIT % 30.8*  --  29.5* 32.7*   < >  --  38.0   MCV fL 87.0  --  88.6 87.2   < >  --  86.8   MCHC g/dL 30.2*  --  29.8* 30.9*   < >  --  31.1*   PLATELETS 10*3/mm3 295  --  274 228   < >  --  285   INR   --   --   --   --   --   --  1.82*    < > = values in this interval not displayed.     Results from last 7 days   Lab Units 12/07/24 0229 12/05/24  0031 12/04/24  2014 12/04/24  0853 12/03/24  1526   SODIUM mmol/L 143 143  --  142 140   POTASSIUM mmol/L 3.8 3.4* 3.4* 3.2* 3.8   MAGNESIUM mg/dL  --  2.3  --  1.3*  --    CHLORIDE mmol/L 112* 111*  --  110* 100   CO2 mmol/L 22.8 20.9*  --  20.6* 25.2   BUN mg/dL 23 45*  --  53* 50*   CREATININE mg/dL 0.72 0.89  --  1.01* 1.00   CALCIUM mg/dL 7.8* 7.3*  --  7.6* 8.5*   GLUCOSE mg/dL 126* 193*  --  125* 189*   ALBUMIN g/dL  --   --   --   --  3.2*   BILIRUBIN mg/dL  --   --   --   --  0.7   ALK PHOS U/L  --   --   --   --  111   AST (SGOT) U/L  --   --   --   --  20   ALT (SGPT) U/L  --   --   --   --  11   Estimated Creatinine Clearance: 59.4 mL/min (by C-G formula based on SCr of 0.72 mg/dL).  No results found for: \"AMMONIA\"    Glucose   Date/Time Value Ref Range Status   12/05/2024 1314 143 (H) 70 - 130 mg/dL Final     No results found for: \"HGBA1C\"  No results found for: \"TSH\", \"FREET4\"    Blood Culture   Date Value Ref Range Status   12/04/2024 No growth at 24 hours  Preliminary   12/04/2024 No growth at 24 hours  Preliminary   12/03/2024 Streptococcus agalactiae (Group B) (C)  Preliminary   12/03/2024 " "Streptococcus agalactiae (Group B) (C)  Preliminary     Urine Culture   Date Value Ref Range Status   12/03/2024 >100,000 CFU/mL Gram Negative Bacilli (A)  Preliminary     Wound Culture   Date Value Ref Range Status   12/03/2024 Light growth (2+) Escherichia coli (A)  Preliminary   12/03/2024 Rare growth Proteus mirabilis (A)  Preliminary   12/03/2024 Light growth (2+) Gram Positive Cocci (A)  Preliminary     No results found for: \"STOOLCX\"  No results found for: \"RESPCX\"  Pain Management Panel           No data to display                  ----------------------------------------------------------------------------------------------------------------------  Imaging Results (Last 24 Hours)       ** No results found for the last 24 hours. **            ----------------------------------------------------------------------------------------------------------------------    Pertinent Infectious Disease Results                Assessment/Plan       Assessment       Group B strep bacteremia  Osteomyelitis of the coccyx  UTI      Plan      Resting comfortably in bed this morning.  No issues or complaints.  Continues on 3 L per nasal cannula with no apparent distress.  Lung sounds diminished bilaterally.  Abdomen soft, nontender.  Afebrile, denies diarrhea.  WBC slightly elevated 12.14.  Procalcitonin 1.90.    Blood cultures from 12/4/2024 show no growth.      Status post sacral wound debridement on 12/6/2024.  Blood cultures from 12/4/2024 show no growth thus far.    Wound culture of the coccyx preliminary reports growth of E. coli, Proteus and gram-positive cocci.  Urine culture from 12/3/2024 preliminary reports greater than 100,000 colonies of gram-negative bacilli.    MRI of the pelvis reported findings consistent with osteomyelitis of the coccyx.  Pending finalization of urine culture and wound culture results along with Streptococcus bacteremia will continue vancomycin pharmacy to dose and ceftriaxone 2 g IV every 24 " hours.  Recommend intraoperative wound cultures.  PICC line consult ordered.  Patient will require 6 weeks of IV antibiotic therapy for treatment of osteomyelitis through 1/15/2025.      Doxycycline 100 mg IV every 12 hours was initiated by primary team due to worsening oxygen requirements and developing pneumonia on chest x-ray on 12/7/2024, we will continue this for now.  We will continue to follow closely and adjust antibiotic therapy as needed.      ANTIMICROBIAL THERAPY    cefTRIAXone (ROCEPHIN) 2000 mg IVPB in 100 mL NS (VTB)  doxycycline (VIBRAMYCIN) 100 mg IVPB in 100 mL NS (VTB)  vancomycin     Code Status:   Code Status and Medical Interventions: CPR (Attempt to Resuscitate); Full Support   Ordered at: 12/04/24 0840     Code Status (Patient has no pulse and is not breathing):    CPR (Attempt to Resuscitate)     Medical Interventions (Patient has pulse or is breathing):    Full Support       Chula Mccall, ROBBI  12/08/24  13:09 EST

## 2024-12-08 NOTE — PROGRESS NOTES
Patient Identification:  Name:  Tsering Alberto  Age:  89 y.o.  Sex:  female  :  1935  MRN:  [id number]  Visit Number:  [id number]  Primary Care Provider:  Nicolette Colbert MD    Length of stay:  5    Subjective/Interval History/Consultants/Procedures     Chief Complaint:   Chief Complaint   Patient presents with    Wound Check       Subjective/Interval History:    89 y.o. female who was admitted on 12/3/2024 with UTI, sacral wound     PMH is significant for  hypertension, diabetes mellitus, HFpEF, CAD, Hx mitral valve replacement, dementia   For complete admission information, please see history and physical.     Consultations:  Infectious disease  CM/SW  General surgery   PT OT    Procedures/Scans:  CXR  MRI pelvis without contrast  Debridement of sacral ulcer, 2024    Today, the patient ws seen and examined, awake and alert, complaining of increased pain of the sacrum/coccyx after she had been repositioned in bed. She denies any significant shortness of breath, does report a cough. Denies any hx difficulty swallowing/choking on her foot. Also reports chilling/subjective fever this morning.      Room location at the time of evaluation was [facility].    ----------------------------------------------------------------------------------------------------------------------  [facility] Meds:  amLODIPine, 5 mg, Oral, Daily  ascorbic acid, 500 mg, Oral, BID  carboxymethylcellulose, 2 drop, Both Eyes, TID  cefTRIAXone, 2,000 mg, Intravenous, Q24H  collagenase, 1 Application, Topical, Nightly  doxycycline, 100 mg, Intravenous, Q12H  erythromycin, 1 Application, Both Eyes, Nightly  heparin (porcine), 5,000 Units, Subcutaneous, Q12H  linagliptin, 5 mg, Oral, Daily  [Held by provider] metoprolol tartrate, 25 mg, Oral, BID  sertraline, 100 mg, Oral, Daily  sodium chloride, 10 mL, Intravenous, Q12H  sodium chloride, 10 mL, Intravenous, Q12H  sodium hypochlorite, , Topical, BID  vancomycin, 750 mg,  Intravenous, Q24H  zinc sulfate, 220 mg, Oral, BID         ----------------------------------------------------------------------------------------------------------------------      Objective     Vital Signs:  Temp:  [97.7 °F (36.5 °C)-98.3 °F (36.8 °C)] 97.7 °F (36.5 °C)  Heart Rate:  [68-82] 82  Resp:  [18] 18  BP: (113-144)/(43-57) 124/52      12/06/24  0500 12/07/24  0500 12/08/24  0500   Weight: 69.8 kg (153 lb 12.8 oz) 69.5 kg (153 lb 4.8 oz) 69.7 kg (153 lb 10.6 oz)     Body mass index is 22.05 kg/m².    Intake/Output Summary (Last 24 hours) at 12/8/2024 1028  Last data filed at 12/8/2024 0943  Gross per 24 hour   Intake 720 ml   Output 850 ml   Net -130 ml     I/O this shift:  In: 240 [P.O.:240]  Out: -   Diet: Regular/House; Fluid Consistency: Thin (IDDSI 0)  ----------------------------------------------------------------------------------------------------------------------    Physical Exam  Vitals and nursing note reviewed.   Constitutional:       General: She is not in acute distress.     Appearance: She is ill-appearing.   HENT:      Head: Normocephalic and atraumatic.   Eyes:      Extraocular Movements: Extraocular movements intact.   Cardiovascular:      Rate and Rhythm: Normal rate and regular rhythm.   Pulmonary:      Effort: Pulmonary effort is normal.      Breath sounds: Normal breath sounds.   Abdominal:      Palpations: Abdomen is soft.   Musculoskeletal:      Right lower leg: No edema.      Left lower leg: No edema.   Skin:     General: Skin is warm and dry.   Neurological:      Mental Status: She is alert. Mental status is at baseline.   Psychiatric:         Mood and Affect: Mood normal.                ----------------------------------------------------------------------------------------------------------------------  Tele:      ----------------------------------------------------------------------------------------------------------------------      Results from last 7 days   Lab Units  "12/08/24  0209 12/07/24  1222 12/07/24  0229 12/05/24  0031 12/04/24  0043 12/03/24  1857 12/03/24  1526   CRP mg/dL  --  13.66*  --   --   --   --   --    LACTATE mmol/L  --   --   --   --   --  1.6 3.4*   WBC 10*3/mm3 12.14*  --  11.38* 10.42   < >  --  15.17*   HEMOGLOBIN g/dL 9.3*  --  8.8* 10.1*   < >  --  11.8*   HEMATOCRIT % 30.8*  --  29.5* 32.7*   < >  --  38.0   MCV fL 87.0  --  88.6 87.2   < >  --  86.8   MCHC g/dL 30.2*  --  29.8* 30.9*   < >  --  31.1*   PLATELETS 10*3/mm3 295  --  274 228   < >  --  285   INR   --   --   --   --   --   --  1.82*    < > = values in this interval not displayed.     Results from last 7 days   Lab Units 12/06/24  1903   PH, ARTERIAL pH units 7.390   PO2 ART mm Hg 60.1*   PCO2, ARTERIAL mm Hg 40.4   HCO3 ART mmol/L 24.4     Results from last 7 days   Lab Units 12/07/24 0229 12/05/24  0031 12/04/24  2014 12/04/24  0853 12/03/24  1526   SODIUM mmol/L 143 143  --  142 140   POTASSIUM mmol/L 3.8 3.4* 3.4* 3.2* 3.8   MAGNESIUM mg/dL  --  2.3  --  1.3*  --    CHLORIDE mmol/L 112* 111*  --  110* 100   CO2 mmol/L 22.8 20.9*  --  20.6* 25.2   BUN mg/dL 23 45*  --  53* 50*   CREATININE mg/dL 0.72 0.89  --  1.01* 1.00   CALCIUM mg/dL 7.8* 7.3*  --  7.6* 8.5*   GLUCOSE mg/dL 126* 193*  --  125* 189*   ALBUMIN g/dL  --   --   --   --  3.2*   BILIRUBIN mg/dL  --   --   --   --  0.7   ALK PHOS U/L  --   --   --   --  111   AST (SGOT) U/L  --   --   --   --  20   ALT (SGPT) U/L  --   --   --   --  11   Estimated Creatinine Clearance: 59.4 mL/min (by C-G formula based on SCr of 0.72 mg/dL).  No results found for: \"AMMONIA\"      Blood Culture   Date Value Ref Range Status   12/04/2024 No growth at 4 days  Preliminary   12/04/2024 No growth at 4 days  Preliminary   12/03/2024 Streptococcus agalactiae (Group B) (C)  Final   12/03/2024 Streptococcus agalactiae (Group B) (C)  Final     Urine Culture   Date Value Ref Range Status   12/03/2024 >100,000 CFU/mL Escherichia coli (A)  Final " "  12/03/2024 >100,000 CFU/mL Proteus mirabilis (A)  Final     Wound Culture   Date Value Ref Range Status   12/03/2024 Light growth (2+) Escherichia coli (A)  Final   12/03/2024 Rare growth Proteus mirabilis (A)  Final   12/03/2024 Light growth (2+) Enterococcus faecalis (A)  Final   12/03/2024 (A)  Final    Light growth (2+) Streptococcus agalactiae (Group B)     Comment:       This organism is considered to be universally susceptible to penicillin.  No further antibiotic testing will be performed. If Clindamycin or Erythromycin is the drug of choice, notify the laboratory within 7 days to request susceptibility testing.     No results found for: \"STOOLCX\"  ----------------------------------------------------------------------------------------------------------------------  Imaging Results (Last 24 Hours)       Procedure Component Value Units Date/Time    XR Chest 1 View [[id number]] Collected: 12/07/24 1059     Updated: 12/07/24 1102    Narrative:      PROCEDURE: XR CHEST 1 VW-       HISTORY: increasing o2 requirement; A41.9-Sepsis, unspecified organism;  L89.95-Pressure ulcer of unspecified site, unstageable; N39.0-Urinary  tract infection, site not specified     COMPARISON: 12/3/2024.     FINDINGS: A right PICC is in place with the tip in the SVC. The the  patient is status post median sternotomy and mitral valve repair. Heart  is normal in size. The mediastinum is unremarkable. There is a worsening  left basilar opacity. The right lung is clear. There is no pneumothorax.  There are no acute osseous abnormalities.       Impression:      Worsening left basilar opacity which may reflect atelectasis  or pneumonia.        This report was finalized on 12/7/2024 11:00 AM by Diana Ozuna M.D..             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  On workup in the ED she did meet severe sepsis criteria with tachycardia, leukocytosis, fever up to 102 and lactate greater than 3.  Pro-Merritt was 0.96.  She was given Merrem and vancomycin in the ED and started on maintenance normal saline at 100 mL/h  Given history of drug-resistant organisms we have consulted infectious disease for further assistance and recommendations, appreciated. Urine culture grew E. coli and Proteus, Wound culture is grew e coli, proteus, e faecalis, and strep agalactiae.  Blood cultures did grow group B strep.  Repeat blood cultures remain without growth to date.  Wound was further evaluated with MRI of the pelvis which identified findings consistent with osteomyelitis of the coccyx.  Rocephin and vancomycin have been continued for now with plan to continue through 1/15/2025.   PICC line was placed 12/6.  Patient with increasing O2 requirement throughout admission- repeated CXR today which is concerning for left lower lobe pneumonia. Did add doxycycline 12/7. CRP was 13.6, proal increased to 1.9. Remaining stable on 3L currently. Titrate supplemental O2 as needed and wean as able.  Repeat labs show WBC 12.14 today.   Will follow up further ID recommendations.   General surgery also consulted and following- patient underwent surgical debridement 12/6/24 and tolerated well.   Will continue supportive care measures and close monitoring.  Trend labs  CM/SW assisting with discharge planning. PT OT to see and evaluate as well. Patient [facility]-family would prefer the  [facility] and patient has been placed on waiting list.  Patient's son is okay with discharge to [facility] once clinically stable.     Hypomagnesemia  Hypokalemia   Monitor and replace electrolytes as needed per protocol     Arthritis  HTN  HLD  Diabetes mellitus   VSS- metoprolol tartrate held currently  Home regimen resumed as indicated  Plan to add SSI if needed    -----------  -DVT prophylaxis: [facility]  -Disposition plans/anticipated needs: Pending further clinical course. Plan for return to SNF once clinically stable.        The patient is high risk due to the following diagnoses/reasons:  bacteremia, UTI, osteomyelitis, PNA        José Luis Beckett PA-C  12/08/24  10:28 EST

## 2024-12-08 NOTE — PLAN OF CARE
Goal Outcome Evaluation:      Patient resting in bed. VSS. Wound care completed per protocol, prn pain medication provided. Plan of care ongoing.

## 2024-12-09 VITALS
SYSTOLIC BLOOD PRESSURE: 128 MMHG | BODY MASS INDEX: 22.09 KG/M2 | DIASTOLIC BLOOD PRESSURE: 60 MMHG | WEIGHT: 154.32 LBS | HEIGHT: 70 IN | HEART RATE: 83 BPM | OXYGEN SATURATION: 93 % | RESPIRATION RATE: 18 BRPM | TEMPERATURE: 97.5 F

## 2024-12-09 PROBLEM — A41.9 SEPSIS: Status: RESOLVED | Noted: 2024-12-03 | Resolved: 2024-12-09

## 2024-12-09 LAB
ANION GAP SERPL CALCULATED.3IONS-SCNC: 10.9 MMOL/L (ref 5–15)
ANISOCYTOSIS BLD QL: ABNORMAL
BACTERIA SPEC AEROBE CULT: NORMAL
BACTERIA SPEC AEROBE CULT: NORMAL
BUN SERPL-MCNC: 12 MG/DL (ref 8–23)
BUN/CREAT SERPL: 15.6 (ref 7–25)
CALCIUM SPEC-SCNC: 8.6 MG/DL (ref 8.6–10.5)
CHLORIDE SERPL-SCNC: 108 MMOL/L (ref 98–107)
CO2 SERPL-SCNC: 24.1 MMOL/L (ref 22–29)
CREAT SERPL-MCNC: 0.77 MG/DL (ref 0.57–1)
DEPRECATED RDW RBC AUTO: 49.7 FL (ref 37–54)
EGFRCR SERPLBLD CKD-EPI 2021: 74.3 ML/MIN/1.73
EOSINOPHIL # BLD MANUAL: 0.31 10*3/MM3 (ref 0–0.4)
EOSINOPHIL NFR BLD MANUAL: 2 % (ref 0.3–6.2)
ERYTHROCYTE [DISTWIDTH] IN BLOOD BY AUTOMATED COUNT: 15.9 % (ref 12.3–15.4)
GLUCOSE SERPL-MCNC: 126 MG/DL (ref 65–99)
HCT VFR BLD AUTO: 34.2 % (ref 34–46.6)
HGB BLD-MCNC: 10.3 G/DL (ref 12–15.9)
HYPOCHROMIA BLD QL: ABNORMAL
LARGE PLATELETS: ABNORMAL
LYMPHOCYTES # BLD MANUAL: 1.42 10*3/MM3 (ref 0.7–3.1)
LYMPHOCYTES NFR BLD MANUAL: 8 % (ref 5–12)
MCH RBC QN AUTO: 26.5 PG (ref 26.6–33)
MCHC RBC AUTO-ENTMCNC: 30.1 G/DL (ref 31.5–35.7)
MCV RBC AUTO: 87.9 FL (ref 79–97)
METAMYELOCYTES NFR BLD MANUAL: 1 % (ref 0–0)
MONOCYTES # BLD: 1.26 10*3/MM3 (ref 0.1–0.9)
MYELOCYTES NFR BLD MANUAL: 2 % (ref 0–0)
NEUTROPHILS # BLD AUTO: 12.27 10*3/MM3 (ref 1.7–7)
NEUTROPHILS NFR BLD MANUAL: 72 % (ref 42.7–76)
NEUTS BAND NFR BLD MANUAL: 6 % (ref 0–5)
PLATELET # BLD AUTO: 370 10*3/MM3 (ref 140–450)
PMV BLD AUTO: 10.8 FL (ref 6–12)
POTASSIUM SERPL-SCNC: 4.1 MMOL/L (ref 3.5–5.2)
RBC # BLD AUTO: 3.89 10*6/MM3 (ref 3.77–5.28)
SCAN SLIDE: NORMAL
SODIUM SERPL-SCNC: 143 MMOL/L (ref 136–145)
VARIANT LYMPHS NFR BLD MANUAL: 9 % (ref 19.6–45.3)
WBC NRBC COR # BLD AUTO: 15.73 10*3/MM3 (ref 3.4–10.8)

## 2024-12-09 PROCEDURE — 25010000002 HEPARIN (PORCINE) PER 1000 UNITS: Performed by: SURGERY

## 2024-12-09 PROCEDURE — 99239 HOSP IP/OBS DSCHRG MGMT >30: CPT

## 2024-12-09 PROCEDURE — 99232 SBSQ HOSP IP/OBS MODERATE 35: CPT | Performed by: NURSE PRACTITIONER

## 2024-12-09 PROCEDURE — 80048 BASIC METABOLIC PNL TOTAL CA: CPT

## 2024-12-09 PROCEDURE — 85007 BL SMEAR W/DIFF WBC COUNT: CPT

## 2024-12-09 PROCEDURE — 25010000002 CEFTRIAXONE PER 250 MG: Performed by: SURGERY

## 2024-12-09 PROCEDURE — 85025 COMPLETE CBC W/AUTO DIFF WBC: CPT

## 2024-12-09 PROCEDURE — 25010000002 KETOROLAC TROMETHAMINE PER 15 MG: Performed by: INTERNAL MEDICINE

## 2024-12-09 PROCEDURE — 97162 PT EVAL MOD COMPLEX 30 MIN: CPT

## 2024-12-09 RX ORDER — DOXYCYCLINE 100 MG/1
100 CAPSULE ORAL 2 TIMES DAILY
Qty: 6 CAPSULE | Refills: 0 | Status: SHIPPED | OUTPATIENT
Start: 2024-12-09 | End: 2024-12-12

## 2024-12-09 RX ORDER — KETOROLAC TROMETHAMINE 30 MG/ML
15 INJECTION, SOLUTION INTRAMUSCULAR; INTRAVENOUS ONCE
Status: COMPLETED | OUTPATIENT
Start: 2024-12-09 | End: 2024-12-09

## 2024-12-09 RX ORDER — ACETAMINOPHEN 500 MG
1000 TABLET ORAL ONCE
Status: COMPLETED | OUTPATIENT
Start: 2024-12-09 | End: 2024-12-09

## 2024-12-09 RX ADMIN — KETOROLAC TROMETHAMINE 15 MG: 30 INJECTION, SOLUTION INTRAMUSCULAR; INTRAVENOUS at 19:40

## 2024-12-09 RX ADMIN — HYDROCODONE BITARTRATE AND ACETAMINOPHEN 1 TABLET: 5; 325 TABLET ORAL at 02:52

## 2024-12-09 RX ADMIN — ACETAMINOPHEN 1000 MG: 500 TABLET ORAL at 19:40

## 2024-12-09 RX ADMIN — CARBOXYMETHYLCELLULOSE SODIUM 2 DROP: 5 SOLUTION/ DROPS OPHTHALMIC at 16:10

## 2024-12-09 RX ADMIN — Medication 220 MG: at 09:23

## 2024-12-09 RX ADMIN — LINAGLIPTIN 5 MG: 5 TABLET, FILM COATED ORAL at 09:23

## 2024-12-09 RX ADMIN — SERTRALINE HYDROCHLORIDE 100 MG: 50 TABLET ORAL at 09:23

## 2024-12-09 RX ADMIN — HEPARIN SODIUM 5000 UNITS: 5000 INJECTION INTRAVENOUS; SUBCUTANEOUS at 09:24

## 2024-12-09 RX ADMIN — DOXYCYCLINE 100 MG: 100 INJECTION, POWDER, LYOPHILIZED, FOR SOLUTION INTRAVENOUS at 13:40

## 2024-12-09 RX ADMIN — DAKIN'S SOLUTION 0.125% (QUARTER STRENGTH): 0.12 SOLUTION at 09:23

## 2024-12-09 RX ADMIN — CARBOXYMETHYLCELLULOSE SODIUM 2 DROP: 5 SOLUTION/ DROPS OPHTHALMIC at 09:23

## 2024-12-09 RX ADMIN — Medication 10 ML: at 09:23

## 2024-12-09 RX ADMIN — SENNOSIDES AND DOCUSATE SODIUM 2 TABLET: 50; 8.6 TABLET ORAL at 16:40

## 2024-12-09 RX ADMIN — HYDROCODONE BITARTRATE AND ACETAMINOPHEN 1 TABLET: 5; 325 TABLET ORAL at 17:40

## 2024-12-09 RX ADMIN — SODIUM CHLORIDE 2000 MG: 9 INJECTION, SOLUTION INTRAVENOUS at 12:34

## 2024-12-09 RX ADMIN — OXYCODONE HYDROCHLORIDE AND ACETAMINOPHEN 500 MG: 500 TABLET ORAL at 09:24

## 2024-12-09 RX ADMIN — HYDROCODONE BITARTRATE AND ACETAMINOPHEN 1 TABLET: 5; 325 TABLET ORAL at 09:33

## 2024-12-09 RX ADMIN — AMLODIPINE BESYLATE 5 MG: 5 TABLET ORAL at 09:24

## 2024-12-09 RX ADMIN — DOXYCYCLINE 100 MG: 100 INJECTION, POWDER, LYOPHILIZED, FOR SOLUTION INTRAVENOUS at 01:14

## 2024-12-09 NOTE — DISCHARGE SUMMARY
[facility] HOSPITALIST DISCHARGE SUMMARY    Patient Identification:  Name:  Tsering Alberto  Age:  89 y.o.  Sex:  female  :  1935  MRN:  [id number]  Visit Number:  [id number]    Date of Admission: 12/3/2024  Date of Discharge:  24     PCP: Nicolette Colbert MD    Discharging Provider: Chandan Beckett PA-C / Dr. Cazares    Discharge Diagnoses     Discharge Diagnoses:  Severe sepsis  Group B strep bacteremia  Acute urinary tract infection  Infected decubitus ulcer of sacrum/coccyx  Osteomyelitis of the coccyx  Left basilar pneumonia  Hypomagnesemia  Hypokalemia    Secondary Diagnoses:  Arthritis  Hypertension  Hyperlipidemia  Diabetes mellitus    Needs on follow up:  Infectious disease 1 week  Wound care 1 week  PCP 1 week  Consults/Procedures     Consults:   Consults       Date and Time Order Name Status Description    2024  2:05 PM Inpatient General Surgery Consult Completed     2024  3:27 AM Inpatient Infectious Diseases Consult Completed             Procedures/Scans Performed:  Procedure(s):  DEBRIDEMENT SACRAL ULCER/WOUND   CXR  MRI pelvis without contrast  Debridement of sacral ulcer, 2024    History of Presenting Illness     Chief Complaint   Patient presents with    Wound Check       Patient is a 89 y.o. female who presented to [facility] complaining of wound check.  Please see the admitting history and physical for further details.      Hospital Course     Patient was admitted to [facility] following presentation to [facility] ED from local SNF on 12/3/24 for further evaluation of fever and worsened appearance of sacral decubitus ulcer.  On admission patient did meet severe sepsis criteria with tachycardia, leukocytosis, fever up to 102 and lactate greater than 3.  Her procalcitonin was 0.96.  Her urine was concerning for infection as well.  She was initially covered empirically with Merrem and vancomycin.  Given her history of drug-resistant organisms infectious  disease was consulted for further assistance and recommendations.  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  Wound culture was obtained and did grow E. coli, Proteus, E faecalis and strep agalactiae.  Shortly after admission patient blood cultures did result positive for group B strep as well-repeats were obtained on 12/4/2024 and have finalized without growth.  Urine culture did ultimately grow E. coli and Proteus as well.  Given these findings infectious disease did transition to Rocephin and vancomycin with recommendations to continue IV antibiotics through 1/15/2025.  Patient did have PICC line placed on 12/6.  Patient did undergo surgical debridement of the sacral decubitus ulcer on 12/6/2024 and tolerated the procedure well.  Later hospital course was complicated by increasing oxygen requirement.  Patient was further evaluated with chest x-ray which was concerning for left lower lobe pneumonia and doxycycline was added to her regimen.  Ultimately with the addition of doxycycline patient did improve and was weaned off of supplemental oxygen to her baseline which is a room air.  On the date of discharge she reported she was feeling improved.  Case management/social work has assisted with discharge planning-verified that patient SNF to accommodate IV antibiotics.  Given patient is clinically stable, on room air with no further inpatient workup planned with antibiotic plan in place she was felt to have reached the maximum benefit of the current hospitalization.  As such case was discussed with attending physician and agree she is stable for discharge on this date.  We will refer her for follow-up in wound care clinic within 1 week.  We have also scheduled ID follow-up within 1 week as well as PCP follow-up within 1 week.      Discharge Vitals/Physical Examination     Vital Signs:  Temp:  [97.3 °F (36.3 °C)-98.9 °F (37.2 °C)] 97.3 °F  (36.3 °C)  Heart Rate:  [78-86] 78  Resp:  [18] 18  BP: (104-179)/(59-79) 122/63  Mean Arterial Pressure (Non-Invasive) for the past 24 hrs (Last 3 readings):   Noninvasive MAP (mmHg)   12/09/24 1257 83   12/09/24 0352 99   12/08/24 2342 93     SpO2 Percentage    12/08/24 1146 12/09/24 0708 12/09/24 1257   SpO2: 96% (P) 95% 92%     SpO2:  [92 %-95 %] 92 %  on  Flow (L/min) (Oxygen Therapy):  [1-3] 1;   Device (Oxygen Therapy): room air    Body mass index is 22.14 kg/m².  Wt Readings from Last 3 Encounters:   12/09/24 70 kg (154 lb 5.2 oz)   11/09/20 94.8 kg (209 lb)   10/05/18 91.6 kg (202 lb)         Physical Exam:  Physical Exam  Vitals and nursing note reviewed.   Constitutional:       General: She is not in acute distress.     Appearance: She is ill-appearing (Chronically ill-appearing).   HENT:      Head: Normocephalic and atraumatic.   Eyes:      Extraocular Movements: Extraocular movements intact.   Cardiovascular:      Rate and Rhythm: Normal rate.   Pulmonary:      Effort: Pulmonary effort is normal.      Breath sounds: Normal breath sounds.   Abdominal:      Palpations: Abdomen is soft.   Musculoskeletal:      Right lower leg: No edema.      Left lower leg: No edema.   Skin:     General: Skin is warm and dry.   Neurological:      Mental Status: She is alert. Mental status is at baseline.   Psychiatric:         Mood and Affect: Mood normal.         Pertinent Laboratory/Radiology Results     Pertinent Laboratory Results:          Results from last 7 days   Lab Units 12/06/24  1903   PH, ARTERIAL pH units 7.390   PO2 ART mm Hg 60.1*   PCO2, ARTERIAL mm Hg 40.4   HCO3 ART mmol/L 24.4     Results from last 7 days   Lab Units 12/09/24  0133 12/08/24  0209 12/07/24  1222 12/07/24  0229 12/04/24  0043 12/03/24  1857 12/03/24  1526   CRP mg/dL  --   --  13.66*  --   --   --   --    LACTATE mmol/L  --   --   --   --   --  1.6 3.4*   WBC 10*3/mm3 15.73* 12.14*  --  11.38*   < >  --  15.17*   HEMOGLOBIN g/dL 10.3*  "9.3*  --  8.8*   < >  --  11.8*   HEMATOCRIT % 34.2 30.8*  --  29.5*   < >  --  38.0   MCV fL 87.9 87.0  --  88.6   < >  --  86.8   MCHC g/dL 30.1* 30.2*  --  29.8*   < >  --  31.1*   PLATELETS 10*3/mm3 370 295  --  274   < >  --  285   INR   --   --   --   --   --   --  1.82*    < > = values in this interval not displayed.     Results from last 7 days   Lab Units 12/09/24  0133 12/07/24  0229 12/05/24  0031 12/04/24  2014 12/04/24  0853 12/03/24  1526   SODIUM mmol/L 143 143 143  --  142 140   POTASSIUM mmol/L 4.1 3.8 3.4*   < > 3.2* 3.8   MAGNESIUM mg/dL  --   --  2.3  --  1.3*  --    CHLORIDE mmol/L 108* 112* 111*  --  110* 100   CO2 mmol/L 24.1 22.8 20.9*  --  20.6* 25.2   BUN mg/dL 12 23 45*  --  53* 50*   CREATININE mg/dL 0.77 0.72 0.89  --  1.01* 1.00   CALCIUM mg/dL 8.6 7.8* 7.3*  --  7.6* 8.5*   GLUCOSE mg/dL 126* 126* 193*  --  125* 189*   ALBUMIN g/dL  --   --   --   --   --  3.2*   BILIRUBIN mg/dL  --   --   --   --   --  0.7   ALK PHOS U/L  --   --   --   --   --  111   AST (SGOT) U/L  --   --   --   --   --  20   ALT (SGPT) U/L  --   --   --   --   --  11    < > = values in this interval not displayed.   Estimated Creatinine Clearance: 55.8 mL/min (by C-G formula based on SCr of 0.77 mg/dL).  No results found for: \"AMMONIA\"    No results found for: \"HGBA1C\", \"POCGLU\"  No results found for: \"HGBA1C\"  No results found for: \"TSH\", \"FREET4\"    Blood Culture   Date Value Ref Range Status   12/04/2024 No growth at 5 days  Final   12/04/2024 No growth at 5 days  Final   12/03/2024 Streptococcus agalactiae (Group B) (C)  Final   12/03/2024 Streptococcus agalactiae (Group B) (C)  Final     Urine Culture   Date Value Ref Range Status   12/03/2024 >100,000 CFU/mL Escherichia coli (A)  Final   12/03/2024 >100,000 CFU/mL Proteus mirabilis (A)  Final     Wound Culture   Date Value Ref Range Status   12/03/2024 Light growth (2+) Escherichia coli (A)  Final   12/03/2024 Rare growth Proteus mirabilis (A)  Final " "  12/03/2024 Light growth (2+) Enterococcus faecalis (A)  Final   12/03/2024 (A)  Final    Light growth (2+) Streptococcus agalactiae (Group B)     Comment:       This organism is considered to be universally susceptible to penicillin.  No further antibiotic testing will be performed. If Clindamycin or Erythromycin is the drug of choice, notify the laboratory within 7 days to request susceptibility testing.     No results found for: \"STOOLCX\"  No results found for: \"RESPCX\"  Pain Management Panel           No data to display                Pertinent Radiology Results:  Imaging Results (All)       Procedure Component Value Units Date/Time    XR Chest 1 View [[id number]] Collected: 12/07/24 1059     Updated: 12/07/24 1102    Narrative:      PROCEDURE: XR CHEST 1 VW-       HISTORY: increasing o2 requirement; A41.9-Sepsis, unspecified organism;  L89.95-Pressure ulcer of unspecified site, unstageable; N39.0-Urinary  tract infection, site not specified     COMPARISON: 12/3/2024.     FINDINGS: A right PICC is in place with the tip in the SVC. The the  patient is status post median sternotomy and mitral valve repair. Heart  is normal in size. The mediastinum is unremarkable. There is a worsening  left basilar opacity. The right lung is clear. There is no pneumothorax.  There are no acute osseous abnormalities.       Impression:      Worsening left basilar opacity which may reflect atelectasis  or pneumonia.        This report was finalized on 12/7/2024 11:00 AM by Diana Ozuna M.D..       MRI Pelvis Without Contrast [[id number]] Collected: 12/05/24 1043     Updated: 12/05/24 1057    Narrative:      PROCEDURE: MRI PELVIS WO CONTRAST-     HISTORY: Rule out osteomyelitis; A41.9-Sepsis, unspecified organism;  L89.95-Pressure ulcer of unspecified site, unstageable; N39.0-Urinary  tract infection, site not specified     PROCEDURE: Multiplanar multisequence imaging of the pelvis was performed  without the use of intravenous " contrast.     COMPARISON: None.     FINDINGS: There is a decubitus ulcer overlying the distal sacrum and  coccyx. There is marrow edema within the coccyx consistent with  osteomyelitis. No discrete abscess is identified. Limited images of the  intrapelvic soft tissues are unremarkable. There is mild diffuse body  wall edema.          Impression:      Findings consistent with osteomyelitis involving the coccyx.                 This report was finalized on 12/5/2024 10:55 AM by Diana Ozuna M.D..       XR Chest 1 View [[id number]] Collected: 12/03/24 1600     Updated: 12/03/24 1602    Narrative:      PROCEDURE: XR CHEST 1 VW-       HISTORY: Severe Sepsis Protocol     COMPARISON: None.     FINDINGS: The heart is normal in size. Note is made of prior mitral  valve repair. The mediastinum is unremarkable. The lungs are clear.  There is no pneumothorax. There are no acute osseous abnormalities.       Impression:      No acute cardiopulmonary process.        This report was finalized on 12/3/2024 4:00 PM by Diana Ozuna M.D..               Discharge Disposition/Discharge Medications/Discharge Appointments     Discharge Disposition:   Skilled Nursing Facility (DC - External)    Discharge Follow up:   Additional Instructions for the Follow-ups that You Need to Schedule       Ambulatory Referral to Wound Clinic   As directed      1 wk    Discharge Follow-up with PCP   As directed       Currently Documented PCP:    Nicolette Colbert MD    PCP Phone Number:    [phone]     Follow Up Details: 1 wk               Follow-up Information       Chula Mccall APRN Follow up on 12/16/2024.    Specialties: Infectious Diseases, Nurse Practitioner  Why: @ 1:00 PM.  Contact information:  [address] [facility] [address]  [facility] [address]  [phone]               Nicolette Colbert MD Follow up on 12/16/2024.    Specialty: Internal Medicine  Why: @ 2:00 PM.  Contact information:  [address] [facility] [address]  [place]  [address]  [phone]               [facility] .    Specialty: Wound Care  Why: @  Contact information:  [address]  [facility] [address]  [phone]                           Condition at Discharge:  Stable     Discharge Medications:     Your medication list        START taking these medications        Instructions Last Dose Given Next Dose Due   cefTRIAXone 2,000 mg in sodium chloride 0.9 % 100 mL IVPB  Start taking on: December 10, 2024      Infuse 2,000 mg into a venous catheter Daily for 36 doses. Indications: Bacteria in the Blood, Infection of the Skin and/or Soft Tissue, Urinary Tract Infection       doxycycline 100 MG capsule  Commonly known as: VIBRAMYCIN      Take 1 capsule by mouth 2 (Two) Times a Day for 3 days.       vancomycin      Infuse 250 mL into a venous catheter Daily for 37 doses. Indications: Infection of the Skin and/or Soft Tissue              CONTINUE taking these medications        Instructions Last Dose Given Next Dose Due   amLODIPine 5 MG tablet  Commonly known as: NORVASC      Take 1 tablet by mouth Daily.       apixaban 2.5 MG tablet tablet  Commonly known as: ELIQUIS      Take 1 tablet by mouth 2 (Two) Times a Day.       ascorbic acid 500 MG tablet  Commonly known as: VITAMIN C      Take 1 tablet by mouth 2 (Two) Times a Day.       Biotene dry mouth liquid liquid      Take 15 mL by mouth Every 12 (Twelve) Hours As Needed for Dry Mouth.       collagenase 250 UNIT/GM ointment      Apply 1 Application topically to the appropriate area as directed Every Night.       Dextran 70-Hypromellose 0.1-0.3 % solution      Apply 2 drops to eye(s) as directed by provider 2 (Two) Times a Day.       erythromycin 5 MG/GM ophthalmic ointment  Commonly known as: ROMYCIN      Administer 1 Application to both eyes Every Night.       ondansetron 4 MG tablet  Commonly known as: ZOFRAN      Take 1 tablet by mouth Every 6 (Six) Hours As Needed for Nausea or  Vomiting.       senna 8.6 MG tablet  Commonly known as: SENOKOT      Take 2 tablets by mouth 2 (Two) Times a Day.       sertraline 100 MG tablet  Commonly known as: ZOLOFT      Take 1 tablet by mouth Daily.       SITagliptin 100 MG tablet  Commonly known as: JANUVIA      Take 1 tablet by mouth Daily.       Systane Nighttime ointment ophthalmic ointment      Administer 1 Application to both eyes Every 12 (Twelve) Hours As Needed (dry eyes).       zinc sulfate 220 (50 Zn) MG capsule  Commonly known as: ZINCATE      Take 1 capsule by mouth 2 (Two) Times a Day.              STOP taking these medications      metoprolol tartrate 25 MG tablet  Commonly known as: LOPRESSOR                  Where to Get Your Medications        These medications were [facility] to [facility] - [place], [place] - [address] [place] [address] [phone] [place] [phone]   [address] [place] [id number]      Phone: [phone]   doxycycline 100 MG capsule       Information about where to get these medications is not yet available    Ask your nurse or doctor about these medications  cefTRIAXone 2,000 mg in sodium chloride 0.9 % 100 mL IVPB  vancomycin          Discharge Diet:  as tolerated    Discharge Activity:  as tolerated      Time spent on this discharge exceeded 30 minutes.

## 2024-12-09 NOTE — PROGRESS NOTES
Vancomycin trough level obtained last night was reported at 18.6 mcg/mL.  This is within the desired therapeutic trough range and corresponds with an estimated steady state AUC of around 500.  Recommend continuing current dose of 750mg iv q24hrs.  Pharmacy will continue to follow.  Thank you.

## 2024-12-09 NOTE — THERAPY EVALUATION
"Acute Care - Physical Therapy Initial Evaluation   [place]     Patient Name: Tsering Alberto  : 1935  MRN: [id number]  Today's Date: 2024      Visit Dx:     ICD-10-CM ICD-9-CM   1. Sepsis, due to unspecified organism, unspecified whether acute organ dysfunction present  A41.9 038.9     995.91   2. Pressure injury, unstageable, unspecified location  L89.95 707.00     707.25   3. Acute UTI  N39.0 599.0   4. Bacteremia  R78.81 790.7   5. Osteomyelitis of other site, unspecified type  M86.9 730.28     Patient Active Problem List   Diagnosis    Nonspecific colitis    Diarrhea    Microscopic colitis    Constipation    Pressure injury, unstageable     Past Medical History:   Diagnosis Date    Arthritis     Diabetes mellitus     Hyperlipidemia     Hypertension      Past Surgical History:   Procedure Laterality Date    CARDIAC SURGERY      \"repaired a heart valve\"    CATARACT EXTRACTION      COLONOSCOPY      REPLACEMENT TOTAL KNEE Bilateral      PT Assessment (Last 12 Hours)       PT Evaluation and Treatment       Row Name 24 1415          Physical Therapy Time and Intention    Subjective Information complains of;weakness  -KM     Document Type evaluation  -KM     Mode of Treatment individual therapy;physical therapy  -KM     Patient Effort adequate  -KM     Symptoms Noted During/After Treatment fatigue  -KM       Row Name 24 1415          General Information    Patient Profile Reviewed yes  -KM     Patient Observations alert;cooperative;agree to therapy  -KM     Prior Level of Function max assist:;dependent:;ADL's;transfer  -KM     Existing Precautions/Restrictions fall  -KM     Risks Reviewed patient:;LOB;nausea/vomiting;dizziness;increased discomfort  -KM     Benefits Reviewed patient:;improve function;increase independence;increase strength;increase balance  -KM     Barriers to Rehab medically complex;previous functional deficit  -KM       Row Name 24 1415          Living Environment    " "Current Living Arrangements extended care facility  -KM     People in Home facility resident  -KM     Primary Care Provided by other (see comments)  facility staff  -KM       Row Name 12/09/24 1415          Home Use of Assistive/Adaptive Equipment    Equipment Currently Used at Home wheelchair  -KM       Row Name 12/09/24 1415          Cognition    Affect/Mental Status (Cognition) WFL  -KM     Orientation Status (Cognition) oriented to;person;place  -KM     Follows Commands (Cognition) follows one-step commands  -KM       Row Name 12/09/24 1415          Range of Motion (ROM)    Range of Motion --  BLE ROM limited  -KM       Row Name 12/09/24 1415          Strength (Manual Muscle Testing)    Strength (Manual Muscle Testing) --  BLE strength grossly 2/5  -KM       Row Name 12/09/24 1415          Bed Mobility    Comment, (Bed Mobility) pt. deferred all mobility skills d/t \"not wanting to do that right now\"  -       Row Name 12/09/24 1415          Transfers    Comment, (Transfers) pt. deferred all mobility skills d/t \"not wanting to do that right now\"  -       Row Name 12/09/24 1415          Gait/Stairs (Locomotion)    Patient was able to Ambulate no, other medical factors prevent ambulation  -KM     Reason Patient was unable to Ambulate Non-Ambulatory at Baseline  per pt. report  -KM       Row Name 12/09/24 1415          Safety Issues/Impairments Affecting Functional Mobility    Safety Issues Affecting Function (Mobility) friction/shear risk  -KM     Impairments Affecting Function (Mobility) balance;endurance/activity tolerance;postural/trunk control;range of motion (ROM);strength  -       Row Name 12/09/24 1415          Balance    Comment, Balance unable to assess at time of evaluation  -       Row Name             Wound 12/04/24 0114 medial coccyx pressure injury    Wound - Properties Group Placement Date: 12/04/24  -CB Placement Time: 0114 -CB Orientation: medial  -CB Location: coccyx  -CB Primary Wound " Type: Pressure inj  -CB Type: pressure injury  -CB Present on Original Admission: Y  -CB    Retired Wound - Properties Group Placement Date: 12/04/24  -CB Placement Time: 0114  -CB Present on Original Admission: Y  -CB Orientation: medial  -CB Location: coccyx  -CB Primary Wound Type: Pressure inj  -CB Type: pressure injury  -CB    Retired Wound - Properties Group Placement Date: 12/04/24  -CB Placement Time: 0114  -CB Present on Original Admission: Y  -CB Orientation: medial  -CB Location: coccyx  -CB Primary Wound Type: Pressure inj  -CB Type: pressure injury  -CB    Retired Wound - Properties Group Date first assessed: 12/04/24  -CB Time first assessed: 0114  -CB Present on Original Admission: Y  -CB Location: coccyx  -CB Primary Wound Type: Pressure inj  -CB Type: pressure injury  -CB      Row Name             Wound 12/04/24 0137 Right lower leg abrasion    Wound - Properties Group Placement Date: 12/04/24  -CB Placement Time: 0137  -CB Side: Right  -CB Orientation: lower  -CB Location: leg  -CB Primary Wound Type: Abrasion  -CB Type: abrasion  -CB Present on Original Admission: Y  -CB    Retired Wound - Properties Group Placement Date: 12/04/24  -CB Placement Time: 0137  -CB Present on Original Admission: Y  -CB Side: Right  -CB Orientation: lower  -CB Location: leg  -CB Primary Wound Type: Abrasion  -CB Type: abrasion  -CB    Retired Wound - Properties Group Placement Date: 12/04/24  -CB Placement Time: 0137  -CB Present on Original Admission: Y  -CB Side: Right  -CB Orientation: lower  -CB Location: leg  -CB Primary Wound Type: Abrasion  -CB Type: abrasion  -CB    Retired Wound - Properties Group Date first assessed: 12/04/24  -CB Time first assessed: 0137  -CB Present on Original Admission: Y  -CB Side: Right  -CB Location: leg  -CB Primary Wound Type: Abrasion  -CB Type: abrasion  -CB      Row Name             Wound 12/04/24 0518 Right lower arm    Wound - Properties Group Placement Date: 12/04/24  -CB  Placement Time: 0518 -CB Side: Right  -CB Orientation: lower  -CB Location: arm  -CB Primary Wound Type: Extravasatio  -CB Present on Original Admission: N  -CB    Retired Wound - Properties Group Placement Date: 12/04/24  -CB Placement Time: 0518 -CB Present on Original Admission: N  -CB Side: Right  -CB Orientation: lower  -CB Location: arm  -CB Primary Wound Type: Extravasatio  -CB    Retired Wound - Properties Group Placement Date: 12/04/24  -CB Placement Time: 0518 -CB Present on Original Admission: N  -CB Side: Right  -CB Orientation: lower  -CB Location: arm  -CB Primary Wound Type: Extravasatio  -CB    Retired Wound - Properties Group Date first assessed: 12/04/24  -CB Time first assessed: 0518 -CB Present on Original Admission: N  -CB Side: Right  -CB Location: arm  -CB Primary Wound Type: Extravasatio  -CB      Row Name 12/09/24 1415          Plan of Care Review    Plan of Care Reviewed With patient  -KM     Outcome Evaluation Pt. evaluation completed during PT session. She was dependent w/ bed mobility. She demonstrates decreased strength and ROM. She did not tolerate activity well. Pt. would benefit from skilled PT services as she is able to improve participation.  -KM       Row Name 12/09/24 1415          Therapy Assessment/Plan (PT)    Functional Level at Time of Evaluation (PT) dependent  -KM     PT Diagnosis (PT) decreased strength and mobility  -KM     Rehab Potential (PT) limited  -KM     Criteria for Skilled Interventions Met (PT) yes;skilled treatment is necessary  -KM     Therapy Frequency (PT) 2 times/wk  2-5x/wk  -KM     Predicted Duration of Therapy Intervention (PT) until discharge  -KM     Problem List (PT) problems related to;balance;mobility;range of motion (ROM);strength;postural control  -KM     Activity Limitations Related to Problem List (PT) unable to ambulate safely;unable to transfer safely  -KM       Row Name 12/09/24 1415          Therapy Plan Review/Discharge Plan (PT)     Therapy Plan Review (PT) evaluation/treatment results reviewed;care plan/treatment goals reviewed;risks/benefits reviewed;patient  -KM               User Key  (r) = Recorded By, (t) = Taken By, (c) = Cosigned By      Initials Name Provider Type    Jay Cruz, PT Physical Therapist    Madeline Dia, RN Registered Nurse                    Physical Therapy Education       Title: PT OT SLP Therapies (Done)       Topic: Physical Therapy (Done)       Point: Mobility training (Done)       Learning Progress Summary            Patient Acceptance, E,TB, VU by  at 12/9/2024 1422                      Point: Home exercise program (Done)       Learning Progress Summary            Patient Acceptance, E,TB, VU by  at 12/9/2024 1422                      Point: Body mechanics (Done)       Learning Progress Summary            Patient Acceptance, E,TB, VU by  at 12/9/2024 1422                      Point: Precautions (Done)       Learning Progress Summary            Patient Acceptance, E,TB, VU by  at 12/9/2024 1422                                      User Key       Initials Effective Dates Name Provider Type Discipline     05/24/22 -  Jay Mcmahon, PT Physical Therapist PT                  PT Recommendation and Plan  Anticipated Discharge Disposition (PT):  (return to prior facility)  Planned Therapy Interventions (PT): balance training, bed mobility training, home exercise program, patient/family education, postural re-education, ROM (range of motion), strengthening, stretching, transfer training  Therapy Frequency (PT): 2 times/wk (2-5x/wk)  Plan of Care Reviewed With: patient  Outcome Evaluation: Pt. evaluation completed during PT session. She was dependent w/ bed mobility. She demonstrates decreased strength and ROM. She did not tolerate activity well. Pt. would benefit from skilled PT services as she is able to improve participation.       Time Calculation:    PT Charges       Row Name 12/09/24 3509              Time Calculation    PT Received On 12/09/24  -OLMAN      PT Goal Re-Cert Due Date 12/23/24  -KM                User Key  (r) = Recorded By, (t) = Taken By, (c) = Cosigned By      Initials Name Provider Type    Jay Cruz, PT Physical Therapist                  Therapy Charges for Today       Code Description Service Date Service Provider Modifiers Qty    60284226575 HC PT EVAL MOD COMPLEXITY 4 12/9/2024 Jay Mcmahon, PT GP 1            PT G-Codes  AM-PAC 6 Clicks Score (PT): 6    Jay Mcmahon PT  12/9/2024

## 2024-12-09 NOTE — CASE MANAGEMENT/SOCIAL WORK
Discharge Planning Assessment  [facility]     Patient Name: Tsering Alberto  MRN: [id number]  Today's Date: 12/9/2024    Admit Date: 12/3/2024    Plan: SS discussed pt on MD rounds. SS contacted [facility] per Spencer who states they will be able to take pt back on both antibiotics at discharge. SS followed up with pt's son, Rajesh on this date to provide an update. Pt's son is agreeable for pt to return back to facility. Pt's son requests for pt to be placed on the waiting list with [facility]. [facility] Griselda. SS provided physician with an update.     Discharge Plan       Row Name 12/09/24 1441       Plan    Final Discharge Disposition Code 03 - skilled nursing facility (SNF)    Final Note Pt is being discharged to [facility] on this date. [facility] Amy who states she will print orders. PCS form filled out. EMS transportation to be arranged. Pt's family aware of discharge. No other needs identified at this time.      1618: SS provided RN with number to call report.     1735: SS contacted [facility] to arrange transportation.                   Continued Care and Services - Admitted Since 12/3/2024       Destination       Service Provider Request Status Services Address Phone Fax Patient Preferred    [facility] Pending - Request Sent -- [address] [address] [phone] --    [facility]  Bed not available -- [address] [address] [phone] --                  Expected Discharge Date and Time       Expected Discharge Date Expected Discharge Time    Dec 9, 2024           JULIANNA Heller

## 2024-12-09 NOTE — PROGRESS NOTES
PROGRESS NOTE         Patient Identification:  Name:  Tsering Alberto  Age:  89 y.o.  Sex:  female  :  1935  MRN:  [id number]  Visit Number:  [id number]  Primary Care Provider:  Nicolette Colbert MD         LOS: 6 days       ----------------------------------------------------------------------------------------------------------------------  Subjective       Chief Complaints:    Wound Check        Interval History:      Patient sitting up in bed eating breakfast.  We are concerned about the possibility of aspiration chronically due to patient eating while lying more flat due to comfort from sacral wound.  Currently on 3 L per nasal cannula with no apparent distress.  Lung sounds diminished bilaterally.  Abdomen soft, nontender.  Afebrile, no reported diarrhea.  WBC 15.73.    Review of Systems:    Unable to obtain due to baseline dementia  ----------------------------------------------------------------------------------------------------------------------      Objective       Current Hospital Meds:  amLODIPine, 5 mg, Oral, Daily  ascorbic acid, 500 mg, Oral, BID  carboxymethylcellulose, 2 drop, Both Eyes, TID  cefTRIAXone, 2,000 mg, Intravenous, Q24H  collagenase, 1 Application, Topical, Nightly  doxycycline, 100 mg, Intravenous, Q12H  erythromycin, 1 Application, Both Eyes, Nightly  heparin (porcine), 5,000 Units, Subcutaneous, Q12H  linagliptin, 5 mg, Oral, Daily  [Held by provider] metoprolol tartrate, 25 mg, Oral, BID  sertraline, 100 mg, Oral, Daily  sodium chloride, 10 mL, Intravenous, Q12H  sodium chloride, 10 mL, Intravenous, Q12H  sodium hypochlorite, , Topical, BID  vancomycin, 750 mg, Intravenous, Q24H  zinc sulfate, 220 mg, Oral, BID         ----------------------------------------------------------------------------------------------------------------------    Vital Signs:  Temp:  [97.3 °F (36.3 °C)-98.9 °F (37.2 °C)] (P) 97.6 °F (36.4 °C)  Heart Rate:  [72-86] (P) 86  Resp:   [18] (P) 18  BP: (104-179)/(48-79) (P) 154/80  Mean Arterial Pressure (Non-Invasive) for the past 24 hrs (Last 3 readings):   Noninvasive MAP (mmHg)   12/09/24 0352 99   12/08/24 2342 93   12/08/24 2017 101     SpO2 Percentage    12/07/24 1030 12/08/24 1146 12/09/24 0708   SpO2: 93% 96% (P) 95%     SpO2:  [95 %] (P) 95 %  on  Flow (L/min) (Oxygen Therapy):  [1-3] 1;   Device (Oxygen Therapy): room air;other (see comments)    Body mass index is 22.14 kg/m².  Wt Readings from Last 3 Encounters:   12/09/24 70 kg (154 lb 5.2 oz)   11/09/20 94.8 kg (209 lb)   10/05/18 91.6 kg (202 lb)        Intake/Output Summary (Last 24 hours) at 12/9/2024 1250  Last data filed at 12/9/2024 0458  Gross per 24 hour   Intake 240 ml   Output 900 ml   Net -660 ml     Diet: Regular/House; Fluid Consistency: Thin (IDDSI 0)  ----------------------------------------------------------------------------------------------------------------------      Physical Exam:    Constitutional: Elderly, chronically ill  female.  Currently on 3 L per nasal cannula.  No complaints.  Eating breakfast this morning.  HENT:  Head: Normocephalic and atraumatic.  Mouth:  Moist mucous membranes.    Eyes:  Conjunctivae and EOM are normal.  No scleral icterus.  Neck:  Neck supple.  No JVD present.    Cardiovascular:  Normal rate, regular rhythm.  4/6 murmur. no edema.  Pulmonary/Chest: Clear but diminished lung sounds bilaterally.  No respiratory distress, no wheezes, no crackles, with normal breath sounds and good air movement.  Abdominal:  Soft.  Bowel sounds are normal.  No distension and no tenderness.   Musculoskeletal:  No edema, no tenderness, and no deformity.  No swelling or redness of joints.  Neurological: Refused.  Baseline dementia.  Skin:  Skin is warm and dry.  No rash noted.  No pallor.  Unstageable sacral decubitus ulcer.  No surrounding cellulitis.  Psychiatric:  Normal mood and affect.  Behavior is  "normal.        ----------------------------------------------------------------------------------------------------------------------          Results from last 7 days   Lab Units 12/06/24  1903   PH, ARTERIAL pH units 7.390   PO2 ART mm Hg 60.1*   PCO2, ARTERIAL mm Hg 40.4   HCO3 ART mmol/L 24.4     Results from last 7 days   Lab Units 12/09/24  0133 12/08/24  0209 12/07/24  1222 12/07/24 0229 12/04/24  0043 12/03/24  1857 12/03/24  1526   CRP mg/dL  --   --  13.66*  --   --   --   --    LACTATE mmol/L  --   --   --   --   --  1.6 3.4*   WBC 10*3/mm3 15.73* 12.14*  --  11.38*   < >  --  15.17*   HEMOGLOBIN g/dL 10.3* 9.3*  --  8.8*   < >  --  11.8*   HEMATOCRIT % 34.2 30.8*  --  29.5*   < >  --  38.0   MCV fL 87.9 87.0  --  88.6   < >  --  86.8   MCHC g/dL 30.1* 30.2*  --  29.8*   < >  --  31.1*   PLATELETS 10*3/mm3 370 295  --  274   < >  --  285   INR   --   --   --   --   --   --  1.82*    < > = values in this interval not displayed.     Results from last 7 days   Lab Units 12/09/24  0133 12/07/24  0229 12/05/24  0031 12/04/24 2014 12/04/24  0853 12/03/24  1526   SODIUM mmol/L 143 143 143  --  142 140   POTASSIUM mmol/L 4.1 3.8 3.4*   < > 3.2* 3.8   MAGNESIUM mg/dL  --   --  2.3  --  1.3*  --    CHLORIDE mmol/L 108* 112* 111*  --  110* 100   CO2 mmol/L 24.1 22.8 20.9*  --  20.6* 25.2   BUN mg/dL 12 23 45*  --  53* 50*   CREATININE mg/dL 0.77 0.72 0.89  --  1.01* 1.00   CALCIUM mg/dL 8.6 7.8* 7.3*  --  7.6* 8.5*   GLUCOSE mg/dL 126* 126* 193*  --  125* 189*   ALBUMIN g/dL  --   --   --   --   --  3.2*   BILIRUBIN mg/dL  --   --   --   --   --  0.7   ALK PHOS U/L  --   --   --   --   --  111   AST (SGOT) U/L  --   --   --   --   --  20   ALT (SGPT) U/L  --   --   --   --   --  11    < > = values in this interval not displayed.   Estimated Creatinine Clearance: 55.8 mL/min (by C-G formula based on SCr of 0.77 mg/dL).  No results found for: \"AMMONIA\"    No results found for: \"HGBA1C\", \"POCGLU\"    No results found " "for: \"HGBA1C\"  No results found for: \"TSH\", \"FREET4\"    Blood Culture   Date Value Ref Range Status   12/04/2024 No growth at 24 hours  Preliminary   12/04/2024 No growth at 24 hours  Preliminary   12/03/2024 Streptococcus agalactiae (Group B) (C)  Preliminary   12/03/2024 Streptococcus agalactiae (Group B) (C)  Preliminary     Urine Culture   Date Value Ref Range Status   12/03/2024 >100,000 CFU/mL Gram Negative Bacilli (A)  Preliminary     Wound Culture   Date Value Ref Range Status   12/03/2024 Light growth (2+) Escherichia coli (A)  Preliminary   12/03/2024 Rare growth Proteus mirabilis (A)  Preliminary   12/03/2024 Light growth (2+) Gram Positive Cocci (A)  Preliminary     No results found for: \"STOOLCX\"  No results found for: \"RESPCX\"  Pain Management Panel           No data to display                  ----------------------------------------------------------------------------------------------------------------------  Imaging Results (Last 24 Hours)       ** No results found for the last 24 hours. **            ----------------------------------------------------------------------------------------------------------------------    Pertinent Infectious Disease Results                Assessment/Plan       Assessment       Group B strep bacteremia  Osteomyelitis of the coccyx  UTI      Plan      Patient sitting up in bed eating breakfast.  We are concerned about the possibility of aspiration chronically due to patient eating while lying more flat due to comfort from sacral wound.  Currently on 3 L per nasal cannula with no apparent distress.  Lung sounds diminished bilaterally.  Abdomen soft, nontender.  Afebrile, no reported diarrhea.  WBC 15.73.    Blood cultures from 12/4/2024 show no growth.      Status post sacral wound debridement on 12/6/2024.  Blood cultures from 12/4/2024 show no growth thus far.    Wound culture of the coccyx preliminary reports growth of E. coli, Proteus and gram-positive cocci.  " Urine culture from 12/3/2024 preliminary reports greater than 100,000 colonies of gram-negative bacilli.    MRI of the pelvis reported findings consistent with osteomyelitis of the coccyx.  Pending finalization of urine culture and wound culture results along with Streptococcus bacteremia will continue vancomycin pharmacy to dose and ceftriaxone 2 g IV every 24 hours.  Recommend intraoperative wound cultures.  PICC line consult ordered.  Patient will require 6 weeks of IV antibiotic therapy for treatment of osteomyelitis through 1/15/2025.      Doxycycline 100 mg IV every 12 hours was initiated by primary team due to worsening oxygen requirements and developing pneumonia on chest x-ray on 12/7/2024, this can be transitioned to oral to complete course upon discharge.  We will continue to follow closely and adjust antibiotic therapy as needed.      ANTIMICROBIAL THERAPY    cefTRIAXone (ROCEPHIN) 2000 mg IVPB in 100 mL NS (VTB)  doxycycline (VIBRAMYCIN) 100 mg IVPB in 100 mL NS (VTB)  vancomycin     Code Status:   Code Status and Medical Interventions: CPR (Attempt to Resuscitate); Full Support   Ordered at: 12/04/24 0840     Code Status (Patient has no pulse and is not breathing):    CPR (Attempt to Resuscitate)     Medical Interventions (Patient has pulse or is breathing):    Full Support       Chula Mccall, ROBBI  12/09/24  12:50 EST

## 2024-12-09 NOTE — PLAN OF CARE
Goal Outcome Evaluation:   Pt is currently resting in bed. VSS with no complaints or S/S of distress. Will continue to follow plan of care.

## 2024-12-09 NOTE — DISCHARGE PLACEMENT REQUEST
"Macario Zacarias (89 y.o. Female)       Date of Birth   1935    Social Security Number       Address   [address] [place] [address]    Home Phone   [phone]    MRN   [id number]       [employer]   Unknown    Marital Status   Unknown                            Admission Date   12/3/24    Admission Type   Emergency    Admitting Provider   Jordy Johnson DO    Attending Provider   Thierno Cazares DO    Department, Room/Bed   [address], [address]/       Discharge Date       Discharge Disposition       Discharge Destination                                 Attending Provider: Thierno Cazares DO    Allergies: Penicillins    Isolation: None   Infection: None   Code Status: CPR    Ht: 177.8 cm (70\")   Wt: 70 kg (154 lb 5.2 oz)    Admission Cmt: None   Principal Problem: Sepsis [A41.9]                   Active Insurance as of 12/3/2024       Primary Coverage       Payor Plan Insurance Group Employer/Plan Group    ANTHEM MEDICARE REPLACEMENT ANTHEM MEDICARE ADVANTAGE [id number]       Payor Plan Address Payor Plan Phone Number Payor Plan Fax Number Effective Dates    [address] [phone]  1/1/2024 - None Entered    [facility] [id number]         Subscriber Name Subscriber Birth Date Member ID       MACARIO ZACARIAS 1935 [id number]               Secondary Coverage       Payor Plan Insurance Group Employer/Plan Group    [place]        Payor Plan Address Payor Plan Phone Number Payor Plan Fax Number Effective Dates    [address] [phone]  12/3/2024 - None Entered    [facility] [address]         Subscriber Name Subscriber Birth Date Member ID       MACARIO ZACARIAS 1935 [id number]                     Emergency Contacts        (Rel.) Home Phone Work Phone Mobile Phone    Rajesh Zacarias (Son) [phone] -- [phone]              Insurance Information                  ANTHEM MEDICARE REPLACEMENT/ANTHEM MEDICARE " [facility] Phone: [phone]    Subscriber: Tsering Alberto Subscriber#: [id number]    Group#: [id number] Precert#: [id number]    Authorization#: -- Effective Date: --        [place] Phone: [phone]    Subscriber: Tsering Alberto Subscriber#: [id number]    Group#: -- Precert#: [id number]    Authorization#: [id number] Effective Date: --             History & Physical        ElizabethJordy DO at 24 1849          [facility]   HISTORY AND PHYSICAL    Patient Name: Tsering Alberto  : 1935  MRN: [id number]  Primary Care Physician:  Nicolette Colbert MD  Date of admission: 12/3/2024    Subjective  Subjective     Chief Complaint: Fever and chills    History of Present Illness  Patient is an 89-year-old female with past medical history of diabetes mellitus and hypertension.  She resides at a local nursing home and was sent to the emergency department because of an elevated temp as high as 101.  She also has had some lower abdominal pain and they have been dealing with a sacral decubitus that is very slow to heal.  Here in the emergency department her temperature was 102 and her heart rate remained above 90.  She was found to have 2 sources of infection including a urinary tract infection as well as the sacral decubitus that appears to have cellulitic border and be draining purulent material.  Patient's lactic acid was also elevated at 3.4 as well as her procalcitonin is 0.96.  Her urine analysis showed 4+ bacteria but was nitrite negative.  Patient has had several different bacteria within her urine over the last year and a half including Klebsiella E. coli and Enterobacter.  They obtain blood cultures wound cultures and her urine will be cultured.  The patient has had Enterococcus in her urine was multidrug-resistant but susceptible to vancomycin.  The patient will be admitted for further evaluation IV hydration and IV antibiotics  Review of Systems   As stated above  in his present illness all other systems were reviewed and subsequently negative  Personal History     Past Medical History:   Diagnosis Date    Diabetes mellitus     Hypertension        Past Surgical History:   Procedure Laterality Date    CARDIAC SURGERY      CATARACT EXTRACTION      COLONOSCOPY      REPLACEMENT TOTAL KNEE         Family History: family history includes Breast cancer in an other family member; Ovarian cancer in her sister. Otherwise pertinent FHx was reviewed and not pertinent to current issue.    Social History:  reports that she has never smoked. She has never used smokeless tobacco. She reports that she does not drink alcohol.    Home Medications:  Biotene dry mouth, Dextran 70-Hypromellose, SITagliptin, Systane Nighttime, amLODIPine, apixaban, ascorbic acid, collagenase, erythromycin, metoprolol tartrate, ondansetron, senna, sertraline, and zinc sulfate    Allergies:  Allergies   Allergen Reactions    Penicillins Rash       Objective   Objective     Vitals:   Temp:  [102 °F (38.9 °C)] 102 °F (38.9 °C)  Heart Rate:  [87-99] 92  Resp:  [18] 18  BP: (111-161)/(64-98) 128/68    Physical Exam    Result Review   Result Review:  I have personally reviewed the results from the time of this admission to 12/3/2024 18:49 EST and agree with these findings:  []  Laboratory list / accordion  []  Microbiology  []  Radiology  []  EKG/Telemetry   []  Cardiology/Vascular   []  Pathology  []  Old records  []  Other:  Most notable findings include:          [place] Urine Culture Tube - Urine, Catheter  Collected: 12/03/24 1538  Final result  Specimen: Urine, Catheter      Extra Tube Hold for add-ons.             12/03/24 1618  Urinalysis With Microscopic If Indicated (No Culture) - Urine, Catheter  Collected: 12/03/24 1538  Final result  Specimen: Urine, Catheter    Color, UA Yellow Bilirubin, UA Negative   Appearance, UA Cloudy Abnormal  Blood, UA Small (1+) Abnormal    pH, UA 7.0 Protein,  mg/dL (2+)  Abnormal    Specific Gravity, UA 1.016 Leuk Esterase, UA Large (3+) Abnormal    Glucose, UA Negative Nitrite, UA Negative   Ketones, UA Negative Urobilinogen, UA 1.0 E.U./dL          12/03/24 1618  Urinalysis, Microscopic Only - Urine, Catheter  Collected: 12/03/24 1538  Final result  Specimen: Urine, Catheter    RBC, UA 3-5 Abnormal  /HPF Squamous Epithelial Cells, UA 0-2 /HPF   WBC, UA Too Numerous to Count Abnormal  /HPF Hyaline Casts, UA 3-6 /LPF   Bacteria, UA 4+ Abnormal  /HPF Methodology Automated Microscopy          12/03/24 1602  Lactic Acid, Plasma  Collected: 12/03/24 1526  Final result  Specimen: Blood from Arm, Left    Lactate 3.4 High Critical  mmol/L            12/03/24 1557  Procalcitonin  Collected: 12/03/24 1526  Final result  Specimen: Blood from Arm, Left    Procalcitonin 0.96 High  ng/mL            12/03/24 1549  Comprehensive Metabolic Panel  Collected: 12/03/24 1526  Final result  Specimen: Blood from Arm, Left    Glucose 189 High  mg/dL ALT (SGPT) 11 U/L   BUN 50 High  mg/dL AST (SGOT) 20 U/L   Creatinine 1.00 mg/dL Alkaline Phosphatase 111 U/L   Sodium 140 mmol/L Total Bilirubin 0.7 mg/dL   Potassium 3.8 mmol/L  Globulin 3.5 gm/dL   Chloride 100 mmol/L A/G Ratio 0.9 g/dL   CO2 25.2 mmol/L BUN/Creatinine Ratio 50.0 High    Calcium 8.5 Low  mg/dL Anion Gap 14.8 mmol/L   Total Protein 6.7 g/dL eGFR 54.3 Low  mL/min/1.73   Albumin 3.2 Low  g/dL            12/03/24 1538  Protime-INR  Collected: 12/03/24 1526  Final result  Specimen: Blood from Arm, Left    Protime 21.2 High  Seconds INR 1.82 High           12/03/24 1538  aPTT  Collected: 12/03/24 1526  Final result  Specimen: Blood from Arm, Left    PTT 41.5 High  seconds            12/03/24 1532  [place] Draw  Collected: 12/03/24 1526  Final result  Specimen: Blood from Arm, Left           12/03/24 1532  Green Top (Gel)  Collected: 12/03/24 1526  Final result  Specimen: Blood from Arm, Left    Extra Tube Hold for add-ons.              12/03/24 1532  Lavender Top  Collected: 12/03/24 1526  Final result  Specimen: Blood from Arm, Left    Extra Tube hold for add-on             12/03/24 1532  Gold Top - SST  Collected: 12/03/24 1526  Final result  Specimen: Blood from Arm, Left    Extra Tube Hold for add-ons.             12/03/24 1532  Light Blue Top  Collected: 12/03/24 1526  Final result  Specimen: Blood from Arm, Left    Extra Tube Hold for add-ons.             12/03/24 1529  CBC & Differential  Collected: 12/03/24 1526  Final result  Specimen: Blood from Arm, Left           12/03/24 1529  CBC Auto Differential  Collected: 12/03/24 1526  Final result  Specimen: Blood from Arm, Left    WBC 15.17 High  10*3/mm3 Lymphocyte % 5.5 Low  %   RBC 4.38 10*6/mm3 Monocyte % 4.3 Low  %   Hemoglobin 11.8 Low  g/dL Eosinophil % 0.1 Low  %   Hematocrit 38.0 % Basophil % 0.2 %   MCV 86.8 fL Immature Grans % 0.5 %   MCH 26.9 pg Neutrophils, Absolute 13.56 High  10*3/mm3   MCHC 31.1 Low  g/dL Lymphocytes, Absolute 0.84 10*3/mm3   RDW 14.6 % Monocytes, Absolute 0.65 10*3/mm3   RDW-SD 46.7 fl Eosinophils, Absolute 0.02 10*3/mm3   MPV 11.8 fL Basophils, Absolute 0.03 10*3/mm3   Platelets 285 10*3/mm3 Immature Grans, Absolute 0.07 High  10*3/mm3   Neutrophil % 89.4 High  % nRBC 0.0 /100 WBC                XR Chest 1 View  Performed: 12/03/24 1554  Final          Impression: No acute cardiopulmonary process. This report was finalized on 12/3/2024 4:00 PM by Diana Ozuna M.D..        Assessment & Plan  Assessment / Plan     Brief Patient Summary:  Tsering Alberto is a 89 y.o. female who presents to the emergency department was found to be septic with a urinary tract infection and infected sacral decubitus.  She will be admitted for further evaluation and treatment    Active Hospital Problems/plan:  Severe sepsis  Complicated urinary tract infection  Infected decubitus ulcer  Leukocytosis  -Patient will be admitted to the hospital service  - Patient  has been started on the sepsis protocol  - Patient received fluid resuscitation in the emergency department we will continue to provide IV fluids in the form of normal saline at 100 cc/h  - I started the patient on IV ceftriaxone and IV vancomycin for broad-spectrum coverage  - We are awaiting urine blood and wound cultures  - Monitor the patient's laboratory data closely  - Keep patient on telemetry for closer monitoring  - Subjectively treat the fever with Tylenol  - Consulted wound care  - With the patient potentially suffering from multiple sources of infection also consult infectious disease    ANTHONY most likely secondary to volume depletion  - We will fluid resuscitate as stated above  - We will repeat lab work in the a.m. to ensure improvement in her renal functions  -Try and avoid nephrotoxic drugs although the patient may require NSAIDs if her fever remains elevated despite Tylenol administration      VTE Prophylaxis:  Pharmacologic VTE prophylaxis orders are signed & held. Pharmacologic VTE prophylaxis orders are present.        CODE STATUS:       Admission Status:  I believe this patient meets inpatient status.    Jordy Johnson DO    Electronically signed by Jordy Johnson DO at 12/03/24 7629       Current Facility-Administered Medications   Medication Dose Route Frequency Provider Last Rate Last Admin    amLODIPine (NORVASC) tablet 5 mg  5 mg Oral Daily Mehran Boyd MD   5 mg at 12/09/24 0924    artificial tears ophthalmic ointment 1 Application  1 Application Both Eyes Q12H PRN Mehran Boyd MD   1 Application at 12/08/24 2205    ascorbic acid (VITAMIN C) tablet 500 mg  500 mg Oral BID Mehran Boyd MD   500 mg at 12/09/24 0924    sennosides-docusate (PERICOLACE) 8.6-50 MG per tablet 2 tablet  2 tablet Oral BID PRN Mehran Boyd MD        And    polyethylene glycol (MIRALAX) packet 17 g  17 g Oral Daily PRN Mehran Boyd MD        And    bisacodyl (DULCOLAX) EC  tablet 5 mg  5 mg Oral Daily PRN Mehran Boyd MD        And    bisacodyl (DULCOLAX) suppository 10 mg  10 mg Rectal Daily PRN Mehran Boyd MD        Calcium Replacement - Follow Nurse / BPA Driven Protocol   Not Applicable PRN Mehran Boyd MD        carboxymethylcellulose (REFRESH PLUS) 0.5 % ophthalmic solution 2 drop  2 drop Both Eyes TID Mehran Boyd MD   2 drop at 12/09/24 0923    cefTRIAXone (ROCEPHIN) 2,000 mg in sodium chloride 0.9 % 100 mL IVPB-VTB  2,000 mg Intravenous Q24H Mehran Boyd  mL/hr at 12/09/24 1234 2,000 mg at 12/09/24 1234    collagenase ointment 1 Application  1 Application Topical Nightly Mehran Boyd MD   1 Application at 12/08/24 2205    doxycycline (VIBRAMYCIN) 100 mg in sodium chloride 0.9 % 100 mL IVPB-VTB  100 mg Intravenous Q12H José Luis Beckett PA-C   100 mg at 12/09/24 1340    erythromycin (ROMYCIN) ophthalmic ointment 1 Application  1 Application Both Eyes Nightly Mehran Boyd MD   1 Application at 12/08/24 2205    heparin (porcine) 5000 UNIT/ML injection 5,000 Units  5,000 Units Subcutaneous Q12H Mehran Boyd MD   5,000 Units at 12/09/24 0924    HYDROcodone-acetaminophen (NORCO) 5-325 MG per tablet 1 tablet  1 tablet Oral Q6H PRN José Luis Beckett PA-C   1 tablet at 12/09/24 0933    linagliptin (TRADJENTA) tablet 5 mg  5 mg Oral Daily Mehran Boyd MD   5 mg at 12/09/24 0923    Magnesium Standard Dose Replacement - Follow Nurse / BPA Driven Protocol   Not Applicable PRN Mehran Boyd MD        [Held by provider] metoprolol tartrate (LOPRESSOR) tablet 25 mg  25 mg Oral BID Mehran Boyd MD   25 mg at 12/04/24 1109    nitroglycerin (NITROSTAT) SL tablet 0.4 mg  0.4 mg Sublingual Q5 Min PRN Mehran Boyd MD        ondansetron ODT (ZOFRAN-ODT) disintegrating tablet 4 mg  4 mg Oral Q6H PRN Mehran Boyd MD        Or    ondansetron (ZOFRAN) injection 4 mg   4 mg Intravenous Q6H PRN Mehran Boyd MD        Phosphorus Replacement - Follow Nurse / BPA Driven Protocol   Not Applicable PRMehran Friedman MD        Potassium Replacement - Follow Nurse / BPA Driven Protocol   Not Applicable PRMehran Friedman MD        sertraline (ZOLOFT) tablet 100 mg  100 mg Oral Daily Mehran Boyd MD   100 mg at 24 0923    sodium chloride 0.9 % flush 10 mL  10 mL Intravenous PRN Mehran Boyd MD        sodium chloride 0.9 % flush 10 mL  10 mL Intravenous Q12H Mehran Boyd MD   10 mL at 24 0923    sodium chloride 0.9 % flush 10 mL  10 mL Intravenous PRN Mehran Boyd MD        sodium chloride 0.9 % flush 10 mL  10 mL Intravenous Q12H [place], Jordy, DO   10 mL at 24 0923    sodium chloride 0.9 % flush 10 mL  10 mL Intravenous PRN Elizabeth, Jordy, DO        sodium chloride 0.9 % flush 20 mL  20 mL Intravenous PRN Elizabeth, Jordy, DO        sodium chloride 0.9 % infusion 40 mL  40 mL Intravenous PRN Mehran Boyd MD        sodium chloride 0.9 % infusion 40 mL  40 mL Intravenous PRN Elizabeth, Jordy, DO        sodium hypochlorite (DAKIN'S 1/4 STRENGTH) 0.125 % topical solution 0.125% solution   Topical BID Mehran Boyd MD   Given at 24 0923    vancomycin 750 mg/250 mL 0.9% NS IVPB ([facility])  750 mg Intravenous Q24H Mehran Boyd MD   750 mg at 24 2204    zinc sulfate (ZINCATE) capsule 220 mg  220 mg Oral BID Mehran Boyd MD   220 mg at 24 0923        Physician Progress Notes (most recent note)        Chula Mccall APRN at 24 1250                     PROGRESS NOTE         Patient Identification:  Name:  Tsering Alberto  Age:  89 y.o.  Sex:  female  :  1935  MRN:  [id number]  Visit Number:  [id number]  Primary Care Provider:  Nicolette Colbert MD         LOS: 6 days        ----------------------------------------------------------------------------------------------------------------------  Subjective       Chief Complaints:    Wound Check        Interval History:      Patient sitting up in bed eating breakfast.  We are concerned about the possibility of aspiration chronically due to patient eating while lying more flat due to comfort from sacral wound.  Currently on 3 L per nasal cannula with no apparent distress.  Lung sounds diminished bilaterally.  Abdomen soft, nontender.  Afebrile, no reported diarrhea.  WBC 15.73.    Review of Systems:    Unable to obtain due to baseline dementia  ----------------------------------------------------------------------------------------------------------------------      Objective       Current Hospital Meds:  amLODIPine, 5 mg, Oral, Daily  ascorbic acid, 500 mg, Oral, BID  carboxymethylcellulose, 2 drop, Both Eyes, TID  cefTRIAXone, 2,000 mg, Intravenous, Q24H  collagenase, 1 Application, Topical, Nightly  doxycycline, 100 mg, Intravenous, Q12H  erythromycin, 1 Application, Both Eyes, Nightly  heparin (porcine), 5,000 Units, Subcutaneous, Q12H  linagliptin, 5 mg, Oral, Daily  [Held by provider] metoprolol tartrate, 25 mg, Oral, BID  sertraline, 100 mg, Oral, Daily  sodium chloride, 10 mL, Intravenous, Q12H  sodium chloride, 10 mL, Intravenous, Q12H  sodium hypochlorite, , Topical, BID  vancomycin, 750 mg, Intravenous, Q24H  zinc sulfate, 220 mg, Oral, BID         ----------------------------------------------------------------------------------------------------------------------    Vital Signs:  Temp:  [97.3 °F (36.3 °C)-98.9 °F (37.2 °C)] (P) 97.6 °F (36.4 °C)  Heart Rate:  [72-86] (P) 86  Resp:  [18] (P) 18  BP: (104-179)/(48-79) (P) 154/80  Mean Arterial Pressure (Non-Invasive) for the past 24 hrs (Last 3 readings):   Noninvasive MAP (mmHg)   12/09/24 0352 99   12/08/24 2342 93   12/08/24 2017 101     SpO2 Percentage    12/07/24 1030  12/08/24 1146 12/09/24 0708   SpO2: 93% 96% (P) 95%     SpO2:  [95 %] (P) 95 %  on  Flow (L/min) (Oxygen Therapy):  [1-3] 1;   Device (Oxygen Therapy): room air;other (see comments)    Body mass index is 22.14 kg/m².  Wt Readings from Last 3 Encounters:   12/09/24 70 kg (154 lb 5.2 oz)   11/09/20 94.8 kg (209 lb)   10/05/18 91.6 kg (202 lb)        Intake/Output Summary (Last 24 hours) at 12/9/2024 1250  Last data filed at 12/9/2024 0458  Gross per 24 hour   Intake 240 ml   Output 900 ml   Net -660 ml     Diet: Regular/House; Fluid Consistency: Thin (IDDSI 0)  ----------------------------------------------------------------------------------------------------------------------      Physical Exam:    Constitutional: Elderly, chronically ill  female.  Currently on 3 L per nasal cannula.  No complaints.  Eating breakfast this morning.  HENT:  Head: Normocephalic and atraumatic.  Mouth:  Moist mucous membranes.    Eyes:  Conjunctivae and EOM are normal.  No scleral icterus.  Neck:  Neck supple.  No JVD present.    Cardiovascular:  Normal rate, regular rhythm.  4/6 murmur. no edema.  Pulmonary/Chest: Clear but diminished lung sounds bilaterally.  No respiratory distress, no wheezes, no crackles, with normal breath sounds and good air movement.  Abdominal:  Soft.  Bowel sounds are normal.  No distension and no tenderness.   Musculoskeletal:  No edema, no tenderness, and no deformity.  No swelling or redness of joints.  Neurological: Refused.  Baseline dementia.  Skin:  Skin is warm and dry.  No rash noted.  No pallor.  Unstageable sacral decubitus ulcer.  No surrounding cellulitis.  Psychiatric:  Normal mood and affect.  Behavior is normal.        ----------------------------------------------------------------------------------------------------------------------          Results from last 7 days   Lab Units 12/06/24  1903   PH, ARTERIAL pH units 7.390   PO2 ART mm Hg 60.1*   PCO2, ARTERIAL mm Hg 40.4   HCO3 ART  "mmol/L 24.4     Results from last 7 days   Lab Units 12/09/24  0133 12/08/24  0209 12/07/24  1222 12/07/24  0229 12/04/24  0043 12/03/24  1857 12/03/24  1526   CRP mg/dL  --   --  13.66*  --   --   --   --    LACTATE mmol/L  --   --   --   --   --  1.6 3.4*   WBC 10*3/mm3 15.73* 12.14*  --  11.38*   < >  --  15.17*   HEMOGLOBIN g/dL 10.3* 9.3*  --  8.8*   < >  --  11.8*   HEMATOCRIT % 34.2 30.8*  --  29.5*   < >  --  38.0   MCV fL 87.9 87.0  --  88.6   < >  --  86.8   MCHC g/dL 30.1* 30.2*  --  29.8*   < >  --  31.1*   PLATELETS 10*3/mm3 370 295  --  274   < >  --  285   INR   --   --   --   --   --   --  1.82*    < > = values in this interval not displayed.     Results from last 7 days   Lab Units 12/09/24  0133 12/07/24  0229 12/05/24  0031 12/04/24  2014 12/04/24  0853 12/03/24  1526   SODIUM mmol/L 143 143 143  --  142 140   POTASSIUM mmol/L 4.1 3.8 3.4*   < > 3.2* 3.8   MAGNESIUM mg/dL  --   --  2.3  --  1.3*  --    CHLORIDE mmol/L 108* 112* 111*  --  110* 100   CO2 mmol/L 24.1 22.8 20.9*  --  20.6* 25.2   BUN mg/dL 12 23 45*  --  53* 50*   CREATININE mg/dL 0.77 0.72 0.89  --  1.01* 1.00   CALCIUM mg/dL 8.6 7.8* 7.3*  --  7.6* 8.5*   GLUCOSE mg/dL 126* 126* 193*  --  125* 189*   ALBUMIN g/dL  --   --   --   --   --  3.2*   BILIRUBIN mg/dL  --   --   --   --   --  0.7   ALK PHOS U/L  --   --   --   --   --  111   AST (SGOT) U/L  --   --   --   --   --  20   ALT (SGPT) U/L  --   --   --   --   --  11    < > = values in this interval not displayed.   Estimated Creatinine Clearance: 55.8 mL/min (by C-G formula based on SCr of 0.77 mg/dL).  No results found for: \"AMMONIA\"    No results found for: \"HGBA1C\", \"POCGLU\"    No results found for: \"HGBA1C\"  No results found for: \"TSH\", \"FREET4\"    Blood Culture   Date Value Ref Range Status   12/04/2024 No growth at 24 hours  Preliminary   12/04/2024 No growth at 24 hours  Preliminary   12/03/2024 Streptococcus agalactiae (Group B) (C)  Preliminary   12/03/2024 " "Streptococcus agalactiae (Group B) (C)  Preliminary     Urine Culture   Date Value Ref Range Status   12/03/2024 >100,000 CFU/mL Gram Negative Bacilli (A)  Preliminary     Wound Culture   Date Value Ref Range Status   12/03/2024 Light growth (2+) Escherichia coli (A)  Preliminary   12/03/2024 Rare growth Proteus mirabilis (A)  Preliminary   12/03/2024 Light growth (2+) Gram Positive Cocci (A)  Preliminary     No results found for: \"STOOLCX\"  No results found for: \"RESPCX\"  Pain Management Panel           No data to display                  ----------------------------------------------------------------------------------------------------------------------  Imaging Results (Last 24 Hours)       ** No results found for the last 24 hours. **            ----------------------------------------------------------------------------------------------------------------------    Pertinent Infectious Disease Results                Assessment/Plan       Assessment       Group B strep bacteremia  Osteomyelitis of the coccyx  UTI      Plan      Patient sitting up in bed eating breakfast.  We are concerned about the possibility of aspiration chronically due to patient eating while lying more flat due to comfort from sacral wound.  Currently on 3 L per nasal cannula with no apparent distress.  Lung sounds diminished bilaterally.  Abdomen soft, nontender.  Afebrile, no reported diarrhea.  WBC 15.73.    Blood cultures from 12/4/2024 show no growth.      Status post sacral wound debridement on 12/6/2024.  Blood cultures from 12/4/2024 show no growth thus far.    Wound culture of the coccyx preliminary reports growth of E. coli, Proteus and gram-positive cocci.  Urine culture from 12/3/2024 preliminary reports greater than 100,000 colonies of gram-negative bacilli.    MRI of the pelvis reported findings consistent with osteomyelitis of the coccyx.  Pending finalization of urine culture and wound culture results along with " Streptococcus bacteremia will continue vancomycin pharmacy to dose and ceftriaxone 2 g IV every 24 hours.  Recommend intraoperative wound cultures.  PICC line consult ordered.  Patient will require 6 weeks of IV antibiotic therapy for treatment of osteomyelitis through 1/15/2025.      Doxycycline 100 mg IV every 12 hours was initiated by primary team due to worsening oxygen requirements and developing pneumonia on chest x-ray on 12/7/2024, this can be transitioned to oral to complete course upon discharge.  We will continue to follow closely and adjust antibiotic therapy as needed.      ANTIMICROBIAL THERAPY    cefTRIAXone (ROCEPHIN) 2000 mg IVPB in 100 mL NS (VTB)  doxycycline (VIBRAMYCIN) 100 mg IVPB in 100 mL NS (VTB)  vancomycin     Code Status:   Code Status and Medical Interventions: CPR (Attempt to Resuscitate); Full Support   Ordered at: 12/04/24 0840     Code Status (Patient has no pulse and is not breathing):    CPR (Attempt to Resuscitate)     Medical Interventions (Patient has pulse or is breathing):    Full Support       ROBBI Schaeffer  12/09/24  12:50 EST    Electronically signed by Chula Mccall APRN at 12/09/24 1252       Physical Therapy Notes (most recent note)    No notes exist for this encounter.       Occupational Therapy Notes (most recent note)    No notes exist for this encounter.       Speech Language Pathology Notes (most recent note)    No notes exist for this encounter.       ADL Documentation (most recent)      Flowsheet Row Most Recent Value   Transferring 4 - completely dependent   Toileting 1 - assistive equipment   Bathing 2 - assistive person   Dressing 2 - assistive person   Eating 2 - assistive person   Communication 0 - understands/communicates without difficulty   Swallowing 0 - swallows foods/liquids without difficulty   Equipment Currently Used at Home none              Chula Mccall APRN   Nurse Practitioner  Infectious Disease     Progress Notes      Signed     Date  of Service: 24  Creation Time: 24     Signed       Expand All Collapse All[]Expand All by Default                  PROGRESS NOTE           Patient Identification:  Name:  Tsering Alberto  Age:  89 y.o.  Sex:  female  :  1935  MRN:  [id number]  Visit Number:  [id number]  Primary Care Provider:  Nicolette Colbert MD            LOS: 6 days         ----------------------------------------------------------------------------------------------------------------------  Subjective         Chief Complaints:     Wound Check           Interval History:       Patient sitting up in bed eating breakfast.  We are concerned about the possibility of aspiration chronically due to patient eating while lying more flat due to comfort from sacral wound.  Currently on 3 L per nasal cannula with no apparent distress.  Lung sounds diminished bilaterally.  Abdomen soft, nontender.  Afebrile, no reported diarrhea.  WBC 15.73.     Review of Systems:     Unable to obtain due to baseline dementia  ----------------------------------------------------------------------------------------------------------------------        Objective         Current Hospital Meds:    Scheduled Medication   amLODIPine, 5 mg, Oral, Daily  ascorbic acid, 500 mg, Oral, BID  carboxymethylcellulose, 2 drop, Both Eyes, TID  cefTRIAXone, 2,000 mg, Intravenous, Q24H  collagenase, 1 Application, Topical, Nightly  doxycycline, 100 mg, Intravenous, Q12H  erythromycin, 1 Application, Both Eyes, Nightly  heparin (porcine), 5,000 Units, Subcutaneous, Q12H  linagliptin, 5 mg, Oral, Daily  [Held by provider] metoprolol tartrate, 25 mg, Oral, BID  sertraline, 100 mg, Oral, Daily  sodium chloride, 10 mL, Intravenous, Q12H  sodium chloride, 10 mL, Intravenous, Q12H  sodium hypochlorite, , Topical, BID  vancomycin, 750 mg, Intravenous, Q24H  zinc sulfate, 220 mg, Oral, BID         Infusion Medications          ----------------------------------------------------------------------------------------------------------------------     Vital Signs:  Temp:  [97.3 °F (36.3 °C)-98.9 °F (37.2 °C)] (P) 97.6 °F (36.4 °C)  Heart Rate:  [72-86] (P) 86  Resp:  [18] (P) 18  BP: (104-179)/(48-79) (P) 154/80  Mean Arterial Pressure (Non-Invasive) for the past 24 hrs (Last 3 readings):    Noninvasive MAP (mmHg)   12/09/24 0352 99   12/08/24 2342 93   12/08/24 2017 101            SpO2 Percentage     12/07/24 1030 12/08/24 1146 12/09/24 0708   SpO2: 93% 96% (P) 95%      SpO2:  [95 %] (P) 95 %  on  Flow (L/min) (Oxygen Therapy):  [1-3] 1;   Device (Oxygen Therapy): room air;other (see comments)     Body mass index is 22.14 kg/m².      Wt Readings from Last 3 Encounters:   12/09/24 70 kg (154 lb 5.2 oz)   11/09/20 94.8 kg (209 lb)   10/05/18 91.6 kg (202 lb)         Intake/Output Summary (Last 24 hours) at 12/9/2024 1250  Last data filed at 12/9/2024 0458      Gross per 24 hour   Intake 240 ml   Output 900 ml   Net -660 ml      Diet: Regular/House; Fluid Consistency: Thin (IDDSI 0)  ----------------------------------------------------------------------------------------------------------------------        Physical Exam:     Constitutional: Elderly, chronically ill  female.  Currently on 3 L per nasal cannula.  No complaints.  Eating breakfast this morning.  HENT:  Head: Normocephalic and atraumatic.  Mouth:  Moist mucous membranes.    Eyes:  Conjunctivae and EOM are normal.  No scleral icterus.  Neck:  Neck supple.  No JVD present.    Cardiovascular:  Normal rate, regular rhythm.  4/6 murmur. no edema.  Pulmonary/Chest: Clear but diminished lung sounds bilaterally.  No respiratory distress, no wheezes, no crackles, with normal breath sounds and good air movement.  Abdominal:  Soft.  Bowel sounds are normal.  No distension and no tenderness.   Musculoskeletal:  No edema, no tenderness, and no deformity.  No swelling or redness of  joints.  Neurological: Refused.  Baseline dementia.  Skin:  Skin is warm and dry.  No rash noted.  No pallor.  Unstageable sacral decubitus ulcer.  No surrounding cellulitis.  Psychiatric:  Normal mood and affect.  Behavior is normal.           ----------------------------------------------------------------------------------------------------------------------               Results from last 7 days   Lab Units 12/06/24  1903   PH, ARTERIAL pH units 7.390   PO2 ART mm Hg 60.1*   PCO2, ARTERIAL mm Hg 40.4   HCO3 ART mmol/L 24.4                 Results from last 7 days   Lab Units 12/09/24  0133 12/08/24  0209 12/07/24  1222 12/07/24  0229 12/04/24  0043 12/03/24  1857 12/03/24  1526   CRP mg/dL  --   --  13.66*  --   --   --   --    LACTATE mmol/L  --   --   --   --   --  1.6 3.4*   WBC 10*3/mm3 15.73* 12.14*  --  11.38*   < >  --  15.17*   HEMOGLOBIN g/dL 10.3* 9.3*  --  8.8*   < >  --  11.8*   HEMATOCRIT % 34.2 30.8*  --  29.5*   < >  --  38.0   MCV fL 87.9 87.0  --  88.6   < >  --  86.8   MCHC g/dL 30.1* 30.2*  --  29.8*   < >  --  31.1*   PLATELETS 10*3/mm3 370 295  --  274   < >  --  285   INR    --   --   --   --   --   --  1.82*    < > = values in this interval not displayed.                Results from last 7 days   Lab Units 12/09/24  0133 12/07/24  0229 12/05/24  0031 12/04/24 2014 12/04/24  0853 12/03/24  1526   SODIUM mmol/L 143 143 143  --  142 140   POTASSIUM mmol/L 4.1 3.8 3.4*   < > 3.2* 3.8   MAGNESIUM mg/dL  --   --  2.3  --  1.3*  --    CHLORIDE mmol/L 108* 112* 111*  --  110* 100   CO2 mmol/L 24.1 22.8 20.9*  --  20.6* 25.2   BUN mg/dL 12 23 45*  --  53* 50*   CREATININE mg/dL 0.77 0.72 0.89  --  1.01* 1.00   CALCIUM mg/dL 8.6 7.8* 7.3*  --  7.6* 8.5*   GLUCOSE mg/dL 126* 126* 193*  --  125* 189*   ALBUMIN g/dL  --   --   --   --   --  3.2*   BILIRUBIN mg/dL  --   --   --   --   --  0.7   ALK PHOS U/L  --   --   --   --   --  111   AST (SGOT) U/L  --   --   --   --   --  20   ALT (SGPT) U/L  --    "--   --   --   --  11    < > = values in this interval not displayed.   Estimated Creatinine Clearance: 55.8 mL/min (by C-G formula based on SCr of 0.77 mg/dL).  No results found for: \"AMMONIA\"     No results found for: \"HGBA1C\", \"POCGLU\"     No results found for: \"HGBA1C\"  No results found for: \"TSH\", \"FREET4\"           Blood Culture   Date Value Ref Range Status   12/04/2024 No growth at 24 hours   Preliminary   12/04/2024 No growth at 24 hours   Preliminary   12/03/2024 Streptococcus agalactiae (Group B) (C)   Preliminary   12/03/2024 Streptococcus agalactiae (Group B) (C)   Preliminary            Urine Culture   Date Value Ref Range Status   12/03/2024 >100,000 CFU/mL Gram Negative Bacilli (A)   Preliminary            Wound Culture   Date Value Ref Range Status   12/03/2024 Light growth (2+) Escherichia coli (A)   Preliminary   12/03/2024 Rare growth Proteus mirabilis (A)   Preliminary   12/03/2024 Light growth (2+) Gram Positive Cocci (A)   Preliminary      No results found for: \"STOOLCX\"  No results found for: \"RESPCX\"  Pain Management Panel                 No data to display                         ----------------------------------------------------------------------------------------------------------------------  Imaging Results (Last 24 Hours)         ** No results found for the last 24 hours. **                ----------------------------------------------------------------------------------------------------------------------     Pertinent Infectious Disease Results                       Assessment/Plan            Assessment  Group B strep bacteremia  Osteomyelitis of the coccyx  UTI           Plan  Patient sitting up in bed eating breakfast.  We are concerned about the possibility of aspiration chronically due to patient eating while lying more flat due to comfort from sacral wound.  Currently on 3 L per nasal cannula with no apparent distress.  Lung sounds diminished bilaterally.  Abdomen soft, " nontender.  Afebrile, no reported diarrhea.  WBC 15.73.     Blood cultures from 12/4/2024 show no growth.       Status post sacral wound debridement on 12/6/2024.  Blood cultures from 12/4/2024 show no growth thus far.     Wound culture of the coccyx preliminary reports growth of E. coli, Proteus and gram-positive cocci.  Urine culture from 12/3/2024 preliminary reports greater than 100,000 colonies of gram-negative bacilli.     MRI of the pelvis reported findings consistent with osteomyelitis of the coccyx.  Pending finalization of urine culture and wound culture results along with Streptococcus bacteremia will continue vancomycin pharmacy to dose and ceftriaxone 2 g IV every 24 hours.  Recommend intraoperative wound cultures.  PICC line consult ordered.  Patient will require 6 weeks of IV antibiotic therapy for treatment of osteomyelitis through 1/15/2025.       Doxycycline 100 mg IV every 12 hours was initiated by primary team due to worsening oxygen requirements and developing pneumonia on chest x-ray on 12/7/2024, this can be transitioned to oral to complete course upon discharge.  We will continue to follow closely and adjust antibiotic therapy as needed.        ANTIMICROBIAL THERAPY     cefTRIAXone (ROCEPHIN) 2000 mg IVPB in 100 mL NS (VTB)  doxycycline (VIBRAMYCIN) 100 mg IVPB in 100 mL NS (VTB)  vancomycin      Code Status:       Code Status and Medical Interventions: CPR (Attempt to Resuscitate); Full Support   Ordered at: 12/04/24 0840     Code Status (Patient has no pulse and is not breathing):     CPR (Attempt to Resuscitate)     Medical Interventions (Patient has pulse or is breathing):     Full Support         ROBBI Schaeffer  12/09/24  12:50 EST                            Discharge Summary    No notes of this type exist for this encounter.       Discharge Order (From admission, onward)      None

## 2024-12-09 NOTE — PLAN OF CARE
Goal Outcome Evaluation:  Patient is to be DC back to [facility], report called and [facility] PICC line kept in place for IV Abx and per Evan ESTRADA. Belongings packed and will be sent back with patient.

## 2024-12-10 NOTE — NURSING NOTE
Pt discharged to nursing home at this time. Tele removed and sent back at this time. Pt belongings gathered and sent back with patient. Family at bedside.

## 2024-12-10 NOTE — PAYOR COMM NOTE
"[facility]  NPI:[id number]    Utilization Review  Contact: Susan Webb RN  Phone: [phone]  Fax:[phone]    DISCHARGE NOTIFICATION    Tsering Zacarias (89 y.o. Female)       Date of Birth   1935    Social Security Number       Address   [address] [place], [place] [address]    Home Phone   [phone]    MRN   [id number]       Zoroastrian   Unknown    Marital Status   Unknown                            Admission Date   12/3/24    Admission Type   Emergency    Admitting Provider   Jordy Johnson DO    Attending Provider       Department, Room/Bed   [address], 3325/1P       Discharge Date   12/9/2024    Discharge Disposition   Skilled Nursing Facility (DC - External)    Discharge Destination                                 Attending Provider: (none)   Allergies: Penicillins    Isolation: None   Infection: None   Code Status: Prior    Ht: 177.8 cm (70\")   Wt: 70 kg (154 lb 5.2 oz)    Admission Cmt: None   Principal Problem: Sepsis [A41.9]                   Active Insurance as of 12/3/2024       Primary Coverage       Payor Plan Insurance Group Employer/Plan Group    ANTHEM MEDICARE REPLACEMENT ANTHEM MEDICARE ADVANTAGE [id number]       Payor Plan Address Payor Plan Phone Number Payor Plan Fax Number Effective Dates    [id number] [phone]  1/1/2024 - None Entered    [facility] [address]         Subscriber Name Subscriber Birth Date Member ID       TSERING ZACARIAS 1935 [id number]               Secondary Coverage       Payor Plan Insurance Group Employer/Plan Group    [place]        Payor Plan Address Payor Plan Phone Number Payor Plan Fax Number Effective Dates    [address] [phone]  12/3/2024 - None Entered    [facility] [id number]         Subscriber Name Subscriber Birth Date Member ID       TSERING ZACARIAS 1935 [id number]                     Emergency Contacts        (Rel.) Home Phone Work Phone " Mobile Phone    Rajesh Alberto (Son) [phone] -- [phone]                 Discharge Summary        José Luis Beckett PA-C at 24 1608       Attestation signed by Thierno Cazares DO at 24 1719    I have reviewed this documentation and agree.                      [facility] HOSPITALIST DISCHARGE SUMMARY    Patient Identification:  Name:  Tsering Alberto  Age:  89 y.o.  Sex:  female  :  1935  MRN:  [id number]  Visit Number:  [id number]    Date of Admission: 12/3/2024  Date of Discharge:  24     PCP: Nicolette Colbert MD    Discharging Provider: Chandan Beckett PA-C / Dr. Cazares    Discharge Diagnoses     Discharge Diagnoses:  Severe sepsis  Group B strep bacteremia  Acute urinary tract infection  Infected decubitus ulcer of sacrum/coccyx  Osteomyelitis of the coccyx  Left basilar pneumonia  Hypomagnesemia  Hypokalemia    Secondary Diagnoses:  Arthritis  Hypertension  Hyperlipidemia  Diabetes mellitus    Needs on follow up:  Infectious disease 1 week  Wound care 1 week  PCP 1 week  Consults/Procedures     Consults:   Consults       Date and Time Order Name Status Description    2024  2:05 PM Inpatient General Surgery Consult Completed     2024  3:27 AM Inpatient Infectious Diseases Consult Completed             Procedures/Scans Performed:  Procedure(s):  DEBRIDEMENT SACRAL ULCER/WOUND   CXR  MRI pelvis without contrast  Debridement of sacral ulcer, 2024    History of Presenting Illness     Chief Complaint   Patient presents with    Wound Check       Patient is a 89 y.o. female who presented to [facility] complaining of wound check.  Please see the admitting history and physical for further details.      Hospital Course     Patient was admitted to [facility] following presentation to [facility] ED from local SNF on 12/3/24 for further evaluation of fever and worsened appearance of sacral decubitus ulcer.  On admission patient did meet severe sepsis  criteria with tachycardia, leukocytosis, fever up to 102 and lactate greater than 3.  Her procalcitonin was 0.96.  Her urine was concerning for infection as well.  She was initially covered empirically with Merrem and vancomycin.  Given her history of drug-resistant organisms infectious disease was consulted for further assistance and recommendations.  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  Wound culture was obtained and did grow E. coli, Proteus, E faecalis and strep agalactiae.  Shortly after admission patient blood cultures did result positive for group B strep as well-repeats were obtained on 12/4/2024 and have finalized without growth.  Urine culture did ultimately grow E. coli and Proteus as well.  Given these findings infectious disease did transition to Rocephin and vancomycin with recommendations to continue IV antibiotics through 1/15/2025.  Patient did have PICC line placed on 12/6.  Patient did undergo surgical debridement of the sacral decubitus ulcer on 12/6/2024 and tolerated the procedure well.  Later hospital course was complicated by increasing oxygen requirement.  Patient was further evaluated with chest x-ray which was concerning for left lower lobe pneumonia and doxycycline was added to her regimen.  Ultimately with the addition of doxycycline patient did improve and was weaned off of supplemental oxygen to her baseline which is a room air.  On the date of discharge she reported she was feeling improved.  Case management/social work has assisted with discharge planning-verified that patient SNF to accommodate IV antibiotics.  Given patient is clinically stable, on room air with no further inpatient workup planned with antibiotic plan in place she was felt to have reached the maximum benefit of the current hospitalization.  As such case was discussed with attending physician and agree she is stable for discharge on  this date.  We will refer her for follow-up in wound care clinic within 1 week.  We have also scheduled ID follow-up within 1 week as well as PCP follow-up within 1 week.      Discharge Vitals/Physical Examination     Vital Signs:  Temp:  [97.3 °F (36.3 °C)-98.9 °F (37.2 °C)] 97.3 °F (36.3 °C)  Heart Rate:  [78-86] 78  Resp:  [18] 18  BP: (104-179)/(59-79) 122/63  Mean Arterial Pressure (Non-Invasive) for the past 24 hrs (Last 3 readings):   Noninvasive MAP (mmHg)   12/09/24 1257 83   12/09/24 0352 99   12/08/24 2342 93     SpO2 Percentage    12/08/24 1146 12/09/24 0708 12/09/24 1257   SpO2: 96% (P) 95% 92%     SpO2:  [92 %-95 %] 92 %  on  Flow (L/min) (Oxygen Therapy):  [1-3] 1;   Device (Oxygen Therapy): room air    Body mass index is 22.14 kg/m².  Wt Readings from Last 3 Encounters:   12/09/24 70 kg (154 lb 5.2 oz)   11/09/20 94.8 kg (209 lb)   10/05/18 91.6 kg (202 lb)         Physical Exam:  Physical Exam  Vitals and nursing note reviewed.   Constitutional:       General: She is not in acute distress.     Appearance: She is ill-appearing (Chronically ill-appearing).   HENT:      Head: Normocephalic and atraumatic.   Eyes:      Extraocular Movements: Extraocular movements intact.   Cardiovascular:      Rate and Rhythm: Normal rate.   Pulmonary:      Effort: Pulmonary effort is normal.      Breath sounds: Normal breath sounds.   Abdominal:      Palpations: Abdomen is soft.   Musculoskeletal:      Right lower leg: No edema.      Left lower leg: No edema.   Skin:     General: Skin is warm and dry.   Neurological:      Mental Status: She is alert. Mental status is at baseline.   Psychiatric:         Mood and Affect: Mood normal.         Pertinent Laboratory/Radiology Results     Pertinent Laboratory Results:          Results from last 7 days   Lab Units 12/06/24  1903   PH, ARTERIAL pH units 7.390   PO2 ART mm Hg 60.1*   PCO2, ARTERIAL mm Hg 40.4   HCO3 ART mmol/L 24.4     Results from last 7 days   Lab Units  "12/09/24  0133 12/08/24  0209 12/07/24  1222 12/07/24  0229 12/04/24  0043 12/03/24  1857 12/03/24  1526   CRP mg/dL  --   --  13.66*  --   --   --   --    LACTATE mmol/L  --   --   --   --   --  1.6 3.4*   WBC 10*3/mm3 15.73* 12.14*  --  11.38*   < >  --  15.17*   HEMOGLOBIN g/dL 10.3* 9.3*  --  8.8*   < >  --  11.8*   HEMATOCRIT % 34.2 30.8*  --  29.5*   < >  --  38.0   MCV fL 87.9 87.0  --  88.6   < >  --  86.8   MCHC g/dL 30.1* 30.2*  --  29.8*   < >  --  31.1*   PLATELETS 10*3/mm3 370 295  --  274   < >  --  285   INR   --   --   --   --   --   --  1.82*    < > = values in this interval not displayed.     Results from last 7 days   Lab Units 12/09/24  0133 12/07/24  0229 12/05/24  0031 12/04/24  2014 12/04/24  0853 12/03/24  1526   SODIUM mmol/L 143 143 143  --  142 140   POTASSIUM mmol/L 4.1 3.8 3.4*   < > 3.2* 3.8   MAGNESIUM mg/dL  --   --  2.3  --  1.3*  --    CHLORIDE mmol/L 108* 112* 111*  --  110* 100   CO2 mmol/L 24.1 22.8 20.9*  --  20.6* 25.2   BUN mg/dL 12 23 45*  --  53* 50*   CREATININE mg/dL 0.77 0.72 0.89  --  1.01* 1.00   CALCIUM mg/dL 8.6 7.8* 7.3*  --  7.6* 8.5*   GLUCOSE mg/dL 126* 126* 193*  --  125* 189*   ALBUMIN g/dL  --   --   --   --   --  3.2*   BILIRUBIN mg/dL  --   --   --   --   --  0.7   ALK PHOS U/L  --   --   --   --   --  111   AST (SGOT) U/L  --   --   --   --   --  20   ALT (SGPT) U/L  --   --   --   --   --  11    < > = values in this interval not displayed.   Estimated Creatinine Clearance: 55.8 mL/min (by C-G formula based on SCr of 0.77 mg/dL).  No results found for: \"AMMONIA\"    No results found for: \"HGBA1C\", \"POCGLU\"  No results found for: \"HGBA1C\"  No results found for: \"TSH\", \"FREET4\"    Blood Culture   Date Value Ref Range Status   12/04/2024 No growth at 5 days  Final   12/04/2024 No growth at 5 days  Final   12/03/2024 Streptococcus agalactiae (Group B) (C)  Final   12/03/2024 Streptococcus agalactiae (Group B) (C)  Final     Urine Culture   Date Value Ref Range " "Status   12/03/2024 >100,000 CFU/mL Escherichia coli (A)  Final   12/03/2024 >100,000 CFU/mL Proteus mirabilis (A)  Final     Wound Culture   Date Value Ref Range Status   12/03/2024 Light growth (2+) Escherichia coli (A)  Final   12/03/2024 Rare growth Proteus mirabilis (A)  Final   12/03/2024 Light growth (2+) Enterococcus faecalis (A)  Final   12/03/2024 (A)  Final    Light growth (2+) Streptococcus agalactiae (Group B)     Comment:       This organism is considered to be universally susceptible to penicillin.  No further antibiotic testing will be performed. If Clindamycin or Erythromycin is the drug of choice, notify the laboratory within 7 days to request susceptibility testing.     No results found for: \"STOOLCX\"  No results found for: \"RESPCX\"  Pain Management Panel           No data to display                Pertinent Radiology Results:  Imaging Results (All)       Procedure Component Value Units Date/Time    XR Chest 1 View [[id number]] Collected: 12/07/24 1059     Updated: 12/07/24 1102    Narrative:      PROCEDURE: XR CHEST 1 VW-       HISTORY: increasing o2 requirement; A41.9-Sepsis, unspecified organism;  L89.95-Pressure ulcer of unspecified site, unstageable; N39.0-Urinary  tract infection, site not specified     COMPARISON: 12/3/2024.     FINDINGS: A right PICC is in place with the tip in the SVC. The the  patient is status post median sternotomy and mitral valve repair. Heart  is normal in size. The mediastinum is unremarkable. There is a worsening  left basilar opacity. The right lung is clear. There is no pneumothorax.  There are no acute osseous abnormalities.       Impression:      Worsening left basilar opacity which may reflect atelectasis  or pneumonia.        This report was finalized on 12/7/2024 11:00 AM by Diana Ozuna M.D..       MRI Pelvis Without Contrast [[id number]] Collected: 12/05/24 1043     Updated: 12/05/24 1057    Narrative:      PROCEDURE: MRI PELVIS WO CONTRAST-   "   HISTORY: Rule out osteomyelitis; A41.9-Sepsis, unspecified organism;  L89.95-Pressure ulcer of unspecified site, unstageable; N39.0-Urinary  tract infection, site not specified     PROCEDURE: Multiplanar multisequence imaging of the pelvis was performed  without the use of intravenous contrast.     COMPARISON: None.     FINDINGS: There is a decubitus ulcer overlying the distal sacrum and  coccyx. There is marrow edema within the coccyx consistent with  osteomyelitis. No discrete abscess is identified. Limited images of the  intrapelvic soft tissues are unremarkable. There is mild diffuse body  wall edema.          Impression:      Findings consistent with osteomyelitis involving the coccyx.                 This report was finalized on 12/5/2024 10:55 AM by Diana Ozuna M.D..       XR Chest 1 View [[id number]] Collected: 12/03/24 1600     Updated: 12/03/24 1602    Narrative:      PROCEDURE: XR CHEST 1 VW-       HISTORY: Severe Sepsis Protocol     COMPARISON: None.     FINDINGS: The heart is normal in size. Note is made of prior mitral  valve repair. The mediastinum is unremarkable. The lungs are clear.  There is no pneumothorax. There are no acute osseous abnormalities.       Impression:      No acute cardiopulmonary process.        This report was finalized on 12/3/2024 4:00 PM by Diana Ozuna M.D..               Discharge Disposition/Discharge Medications/Discharge Appointments     Discharge Disposition:   Skilled Nursing Facility (DC - External)    Discharge Follow up:   Additional Instructions for the Follow-ups that You Need to Schedule       Ambulatory Referral to Wound Clinic   As directed      1 wk    Discharge Follow-up with PCP   As directed       Currently Documented PCP:    Nicolette Colbert MD    PCP Phone Number:    [phone]     Follow Up Details: 1 wk               Follow-up Information       Chula Mccall APRN Follow up on 12/16/2024.    Specialties: Infectious Diseases, Nurse  Practitioner  Why: @ 1:00 PM.  Contact information:  [address]  [facility] [address]  [facility] [address]  [phone]               Nicolette Colbert MD Follow up on 12/16/2024.    Specialty: Internal Medicine  Why: @ 2:00 PM.  Contact information:  [address]  [address]  [facility] [address]  [phone]               [facility] .    Specialty: Wound Care  Why: @  Contact information:  [address] [facility] [address]  [phone]                           Condition at Discharge:  Stable     Discharge Medications:     Your medication list        START taking these medications        Instructions Last Dose Given Next Dose Due   cefTRIAXone 2,000 mg in sodium chloride 0.9 % 100 mL IVPB  Start taking on: December 10, 2024      Infuse 2,000 mg into a venous catheter Daily for 36 doses. Indications: Bacteria in the Blood, Infection of the Skin and/or Soft Tissue, Urinary Tract Infection       doxycycline 100 MG capsule  Commonly known as: VIBRAMYCIN      Take 1 capsule by mouth 2 (Two) Times a Day for 3 days.       vancomycin      Infuse 250 mL into a venous catheter Daily for 37 doses. Indications: Infection of the Skin and/or Soft Tissue              CONTINUE taking these medications        Instructions Last Dose Given Next Dose Due   amLODIPine 5 MG tablet  Commonly known as: NORVASC      Take 1 tablet by mouth Daily.       apixaban 2.5 MG tablet tablet  Commonly known as: ELIQUIS      Take 1 tablet by mouth 2 (Two) Times a Day.       ascorbic acid 500 MG tablet  Commonly known as: VITAMIN C      Take 1 tablet by mouth 2 (Two) Times a Day.       Biotene dry mouth liquid liquid      Take 15 mL by mouth Every 12 (Twelve) Hours As Needed for Dry Mouth.       collagenase 250 UNIT/GM ointment      Apply 1 Application topically to the appropriate area as directed Every Night.       Dextran 70-Hypromellose 0.1-0.3 % solution      Apply 2 drops to eye(s) as directed by provider 2 (Two)  Times a Day.       erythromycin 5 MG/GM ophthalmic ointment  Commonly known as: ROMYCIN      Administer 1 Application to both eyes Every Night.       ondansetron 4 MG tablet  Commonly known as: ZOFRAN      Take 1 tablet by mouth Every 6 (Six) Hours As Needed for Nausea or Vomiting.       senna 8.6 MG tablet  Commonly known as: SENOKOT      Take 2 tablets by mouth 2 (Two) Times a Day.       sertraline 100 MG tablet  Commonly known as: ZOLOFT      Take 1 tablet by mouth Daily.       SITagliptin 100 MG tablet  Commonly known as: JANUVIA      Take 1 tablet by mouth Daily.       Systane Nighttime ointment ophthalmic ointment      Administer 1 Application to both eyes Every 12 (Twelve) Hours As Needed (dry eyes).       zinc sulfate 220 (50 Zn) MG capsule  Commonly known as: ZINCATE      Take 1 capsule by mouth 2 (Two) Times a Day.              STOP taking these medications      metoprolol tartrate 25 MG tablet  Commonly known as: LOPRESSOR                  Where to Get Your Medications        These medications were sent to [facility]. - [place], [place] - [address] [place] [address] [phone] [place] [phone]   [address] [place] [address]      Phone: [phone]   doxycycline 100 MG capsule       Information about where to get these medications is not yet available    Ask your nurse or doctor about these medications  cefTRIAXone 2,000 mg in sodium chloride 0.9 % 100 mL IVPB  vancomycin          Discharge Diet:  as tolerated    Discharge Activity:  as tolerated      Time spent on this discharge exceeded 30 minutes.      Electronically signed by Thierno Cazares DO at 12/09/24 9354

## 2024-12-10 NOTE — PROGRESS NOTES
"Enter Query Response Below      Query Response: Bacterial pneumonia, treated for gram negative organisms              If applicable, please update the problem list.     Patient: Tsering Alberto        : 1935  Account: [id number]           Admit Date: 12/3/2024        How to Respond to this query:       a. Click New Note     b. Answer query within the yellow box.                c. Update the Problem List, if applicable.      If you have any questions about this query contact me at: [email or web address]     José Luis Beckett PA-C,     Patient with history of type 2 diabetes and chronic diastolic CHF presented 12/3 for chills, fever, and sacral decubitus ulcer. H&P notes \"found to be septic with a urinary tract infection and infected sacral decubitus,\" and \"I started the patient on IV ceftriaxone and IV vancomycin for broad-spectrum coverage.\" Progress note dated  includes \"increasing O2 requirement throughout admission- repeated CXR today which is concerning for left lower lobe pneumonia. As WBC is trending upward will add doxycycline for atypical coverage.\" Discharge summary notes \"Patient was further evaluated with chest x-ray which was concerning for left lower lobe pneumonia and doxycycline was added to her regimen. Ultimately with the addition of doxycycline patient did improve and was weaned off of supplemental oxygen to her baseline which is a room air,\" as well as orders for IV ceftriaxone, IV vancomycin, and PO doxycycline to continue after discharge. Patient treated with monitoring, supplemental oxygen, PICC placement, IV meropenem 12/3, IV vancomycin 12/3 - , IV ceftriaxone  - , and IV doxycycline  - .     Please clarify the type of pneumonia the patient was initially treated/monitored for:     - Gram-negative pneumonia (excluding Haemophilus influenzae)  - Bacterial pneumonia unspecified  - Other ___________________  - Unable to clinically determine    By submitting this " query, we are merely seeking further clarification of documentation to accurately reflect all conditions that you are monitoring, evaluating, treating or that extend the hospitalization or utilize additional resources of care. Please utilize your independent clinical judgment when addressing the question(s) above.     This query and your response, once completed, will be entered into the legal medical record.    Sincerely,  Cesar Sharif RN, CDS  Clinical Documentation Integrity Program

## 2024-12-13 ENCOUNTER — LAB REQUISITION (OUTPATIENT)
Dept: LAB | Facility: HOSPITAL | Age: 89
End: 2024-12-13
Payer: MEDICARE

## 2024-12-13 DIAGNOSIS — Z79.2 LONG TERM (CURRENT) USE OF ANTIBIOTICS: ICD-10-CM

## 2024-12-13 LAB — VANCOMYCIN TROUGH SERPL-MCNC: 18.2 MCG/ML (ref 5–20)

## 2024-12-13 PROCEDURE — 80202 ASSAY OF VANCOMYCIN: CPT | Performed by: INTERNAL MEDICINE

## 2024-12-16 ENCOUNTER — LAB REQUISITION (OUTPATIENT)
Dept: LAB | Facility: HOSPITAL | Age: 89
End: 2024-12-16
Payer: MEDICARE

## 2024-12-16 ENCOUNTER — APPOINTMENT (OUTPATIENT)
Dept: WOUND CARE | Facility: HOSPITAL | Age: 89
End: 2024-12-16
Payer: MEDICARE

## 2024-12-16 DIAGNOSIS — I10 ESSENTIAL (PRIMARY) HYPERTENSION: ICD-10-CM

## 2024-12-16 LAB
ALBUMIN SERPL-MCNC: 2.7 G/DL (ref 3.5–5.2)
ALBUMIN/GLOB SERPL: 1 G/DL
ALP SERPL-CCNC: 89 U/L (ref 39–117)
ALT SERPL W P-5'-P-CCNC: 6 U/L (ref 1–33)
ANION GAP SERPL CALCULATED.3IONS-SCNC: 9.6 MMOL/L (ref 5–15)
AST SERPL-CCNC: 18 U/L (ref 1–32)
BASOPHILS # BLD AUTO: 0.03 10*3/MM3 (ref 0–0.2)
BASOPHILS NFR BLD AUTO: 0.2 % (ref 0–1.5)
BILIRUB SERPL-MCNC: 0.3 MG/DL (ref 0–1.2)
BUN SERPL-MCNC: 12 MG/DL (ref 8–23)
BUN/CREAT SERPL: 14.8 (ref 7–25)
CALCIUM SPEC-SCNC: 8.1 MG/DL (ref 8.6–10.5)
CHLORIDE SERPL-SCNC: 98 MMOL/L (ref 98–107)
CO2 SERPL-SCNC: 32.4 MMOL/L (ref 22–29)
CREAT SERPL-MCNC: 0.81 MG/DL (ref 0.57–1)
CRP SERPL-MCNC: 4.19 MG/DL (ref 0–0.5)
DEPRECATED RDW RBC AUTO: 51.9 FL (ref 37–54)
EGFRCR SERPLBLD CKD-EPI 2021: 69.9 ML/MIN/1.73
EOSINOPHIL # BLD AUTO: 0.1 10*3/MM3 (ref 0–0.4)
EOSINOPHIL NFR BLD AUTO: 0.7 % (ref 0.3–6.2)
ERYTHROCYTE [DISTWIDTH] IN BLOOD BY AUTOMATED COUNT: 17.9 % (ref 12.3–15.4)
ERYTHROCYTE [SEDIMENTATION RATE] IN BLOOD: 35 MM/HR (ref 0–30)
GLOBULIN UR ELPH-MCNC: 2.6 GM/DL
GLUCOSE SERPL-MCNC: 99 MG/DL (ref 65–99)
HCT VFR BLD AUTO: 29.6 % (ref 34–46.6)
HGB BLD-MCNC: 9.3 G/DL (ref 12–15.9)
IMM GRANULOCYTES # BLD AUTO: 0.11 10*3/MM3 (ref 0–0.05)
IMM GRANULOCYTES NFR BLD AUTO: 0.8 % (ref 0–0.5)
LYMPHOCYTES # BLD AUTO: 1.52 10*3/MM3 (ref 0.7–3.1)
LYMPHOCYTES NFR BLD AUTO: 11.3 % (ref 19.6–45.3)
MCH RBC QN AUTO: 26.9 PG (ref 26.6–33)
MCHC RBC AUTO-ENTMCNC: 31.4 G/DL (ref 31.5–35.7)
MCV RBC AUTO: 85.5 FL (ref 79–97)
MONOCYTES # BLD AUTO: 1.21 10*3/MM3 (ref 0.1–0.9)
MONOCYTES NFR BLD AUTO: 9 % (ref 5–12)
NEUTROPHILS NFR BLD AUTO: 10.48 10*3/MM3 (ref 1.7–7)
NEUTROPHILS NFR BLD AUTO: 78 % (ref 42.7–76)
NRBC BLD AUTO-RTO: 0 /100 WBC (ref 0–0.2)
PLATELET # BLD AUTO: 308 10*3/MM3 (ref 140–450)
PMV BLD AUTO: 11.4 FL (ref 6–12)
POTASSIUM SERPL-SCNC: 3.6 MMOL/L (ref 3.5–5.2)
PROT SERPL-MCNC: 5.3 G/DL (ref 6–8.5)
RBC # BLD AUTO: 3.46 10*6/MM3 (ref 3.77–5.28)
SODIUM SERPL-SCNC: 140 MMOL/L (ref 136–145)
VANCOMYCIN TROUGH SERPL-MCNC: 15 MCG/ML (ref 5–20)
WBC NRBC COR # BLD AUTO: 13.45 10*3/MM3 (ref 3.4–10.8)

## 2024-12-16 PROCEDURE — 86140 C-REACTIVE PROTEIN: CPT | Performed by: INTERNAL MEDICINE

## 2024-12-16 PROCEDURE — 85652 RBC SED RATE AUTOMATED: CPT | Performed by: INTERNAL MEDICINE

## 2024-12-16 PROCEDURE — 80053 COMPREHEN METABOLIC PANEL: CPT | Performed by: INTERNAL MEDICINE

## 2024-12-16 PROCEDURE — 85025 COMPLETE CBC W/AUTO DIFF WBC: CPT | Performed by: INTERNAL MEDICINE

## 2024-12-16 PROCEDURE — 80202 ASSAY OF VANCOMYCIN: CPT | Performed by: INTERNAL MEDICINE

## 2024-12-19 ENCOUNTER — LAB REQUISITION (OUTPATIENT)
Dept: LAB | Facility: HOSPITAL | Age: 89
End: 2024-12-19
Payer: MEDICARE

## 2024-12-19 DIAGNOSIS — A41.9 SEPSIS, UNSPECIFIED ORGANISM: ICD-10-CM

## 2024-12-19 LAB — VANCOMYCIN TROUGH SERPL-MCNC: 19.4 MCG/ML (ref 5–20)

## 2024-12-19 PROCEDURE — 80202 ASSAY OF VANCOMYCIN: CPT | Performed by: INTERNAL MEDICINE

## 2024-12-20 ENCOUNTER — OFFICE VISIT (OUTPATIENT)
Dept: INFECTIOUS DISEASES | Facility: CLINIC | Age: 89
End: 2024-12-20
Payer: MEDICARE

## 2024-12-20 ENCOUNTER — DOCUMENTATION (OUTPATIENT)
Dept: INFECTIOUS DISEASES | Facility: CLINIC | Age: 89
End: 2024-12-20

## 2024-12-20 VITALS
WEIGHT: 149.4 LBS | DIASTOLIC BLOOD PRESSURE: 55 MMHG | SYSTOLIC BLOOD PRESSURE: 120 MMHG | HEART RATE: 75 BPM | OXYGEN SATURATION: 97 % | RESPIRATION RATE: 18 BRPM | BODY MASS INDEX: 21.44 KG/M2

## 2024-12-20 DIAGNOSIS — M46.28 SACRAL OSTEOMYELITIS: Primary | ICD-10-CM

## 2024-12-20 DIAGNOSIS — R78.81 BACTEREMIA DUE TO GROUP B STREPTOCOCCUS: ICD-10-CM

## 2024-12-20 DIAGNOSIS — B95.1 BACTEREMIA DUE TO GROUP B STREPTOCOCCUS: ICD-10-CM

## 2024-12-20 DIAGNOSIS — N30.00 ACUTE CYSTITIS WITHOUT HEMATURIA: ICD-10-CM

## 2024-12-20 PROBLEM — N39.0 UTI (URINARY TRACT INFECTION): Status: ACTIVE | Noted: 2024-01-01

## 2024-12-20 LAB
ALBUMIN SERPL-MCNC: 3 G/DL (ref 3.5–5.2)
ALBUMIN/GLOB SERPL: 0.8 G/DL
ALP SERPL-CCNC: 111 U/L (ref 39–117)
ALT SERPL W P-5'-P-CCNC: 8 U/L (ref 1–33)
ANION GAP SERPL CALCULATED.3IONS-SCNC: 15.9 MMOL/L (ref 5–15)
AST SERPL-CCNC: 17 U/L (ref 1–32)
BASOPHILS # BLD AUTO: 0.05 10*3/MM3 (ref 0–0.2)
BASOPHILS NFR BLD AUTO: 0.3 % (ref 0–1.5)
BILIRUB SERPL-MCNC: 0.3 MG/DL (ref 0–1.2)
BUN SERPL-MCNC: 24 MG/DL (ref 8–23)
BUN/CREAT SERPL: 24.2 (ref 7–25)
CALCIUM SPEC-SCNC: 8.8 MG/DL (ref 8.6–10.5)
CHLORIDE SERPL-SCNC: 99 MMOL/L (ref 98–107)
CO2 SERPL-SCNC: 31.1 MMOL/L (ref 22–29)
CREAT SERPL-MCNC: 0.99 MG/DL (ref 0.57–1)
CRP SERPL-MCNC: 13.76 MG/DL (ref 0–0.5)
DEPRECATED RDW RBC AUTO: 57.9 FL (ref 37–54)
EGFRCR SERPLBLD CKD-EPI 2021: 55 ML/MIN/1.73
EOSINOPHIL # BLD AUTO: 0.28 10*3/MM3 (ref 0–0.4)
EOSINOPHIL NFR BLD AUTO: 2 % (ref 0.3–6.2)
ERYTHROCYTE [DISTWIDTH] IN BLOOD BY AUTOMATED COUNT: 18.6 % (ref 12.3–15.4)
GLOBULIN UR ELPH-MCNC: 3.6 GM/DL
GLUCOSE SERPL-MCNC: 150 MG/DL (ref 65–99)
HCT VFR BLD AUTO: 35.2 % (ref 34–46.6)
HGB BLD-MCNC: 11 G/DL (ref 12–15.9)
IMM GRANULOCYTES # BLD AUTO: 0.1 10*3/MM3 (ref 0–0.05)
IMM GRANULOCYTES NFR BLD AUTO: 0.7 % (ref 0–0.5)
LYMPHOCYTES # BLD AUTO: 1.74 10*3/MM3 (ref 0.7–3.1)
LYMPHOCYTES NFR BLD AUTO: 12.1 % (ref 19.6–45.3)
MCH RBC QN AUTO: 27.3 PG (ref 26.6–33)
MCHC RBC AUTO-ENTMCNC: 31.3 G/DL (ref 31.5–35.7)
MCV RBC AUTO: 87.3 FL (ref 79–97)
MONOCYTES # BLD AUTO: 1.2 10*3/MM3 (ref 0.1–0.9)
MONOCYTES NFR BLD AUTO: 8.4 % (ref 5–12)
NEUTROPHILS NFR BLD AUTO: 10.96 10*3/MM3 (ref 1.7–7)
NEUTROPHILS NFR BLD AUTO: 76.5 % (ref 42.7–76)
NRBC BLD AUTO-RTO: 0 /100 WBC (ref 0–0.2)
PLATELET # BLD AUTO: 367 10*3/MM3 (ref 140–450)
PMV BLD AUTO: 11.6 FL (ref 6–12)
POTASSIUM SERPL-SCNC: 3.2 MMOL/L (ref 3.5–5.2)
PROT SERPL-MCNC: 6.6 G/DL (ref 6–8.5)
RBC # BLD AUTO: 4.03 10*6/MM3 (ref 3.77–5.28)
SODIUM SERPL-SCNC: 146 MMOL/L (ref 136–145)
WBC NRBC COR # BLD AUTO: 14.33 10*3/MM3 (ref 3.4–10.8)

## 2024-12-20 PROCEDURE — 87040 BLOOD CULTURE FOR BACTERIA: CPT

## 2024-12-20 PROCEDURE — 80053 COMPREHEN METABOLIC PANEL: CPT

## 2024-12-20 PROCEDURE — 86140 C-REACTIVE PROTEIN: CPT

## 2024-12-20 PROCEDURE — 85025 COMPLETE CBC W/AUTO DIFF WBC: CPT

## 2024-12-20 NOTE — PROGRESS NOTES
[facility]         Referring Provider: No referring provider defined for this encounter.    Subjective      Chief Complaint  Hospital Follow Up Visit (Cute UTI bacteremia, osteo)    History of Present Illness  Tsering Alberto is a 89 y.o. female who presents today to [facility] for Hospital Follow Up .Patient was admitted to [facility] following presentation to [facility] ED from local SNF on 12/3/24 for further evaluation of fever and worsened appearance of sacral decubitus ulcer.  On admission patient did meet severe sepsis criteria with tachycardia, leukocytosis, fever up to 102 and lactate greater than 3.  Her procalcitonin was 0.96.  Her urine was concerning for infection as well.  She was initially covered empirically with Merrem and vancomycin.  Given her history of drug-resistant organisms infectious disease was consulted for further assistance and recommendations.      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  Wound culture was obtained and did grow E. coli, Proteus, E faecalis and strep agalactiae.  Shortly after admission patient blood cultures did result positive for group B strep as well-repeats were obtained on 12/4/2024 and have finalized without growth.  Urine culture did ultimately grow E. coli and Proteus as well.  Given these findings infectious disease did transition to Rocephin and vancomycin with recommendations to continue IV antibiotics through 1/15/2025.  Patient did have PICC line placed on 12/6. Patient did undergo surgical debridement of the sacral decubitus ulcer on 12/6/2024 and tolerated the procedure well.    Later hospital course was complicated by increasing oxygen requirement.  Patient was further evaluated with chest x-ray which was concerning for left lower lobe pneumonia and doxycycline was added to her regimen. Ultimately with the addition of doxycycline  "patient did improve and was weaned off of supplemental oxygen to her baseline which is a room air.  On the date of discharge she reported she was feeling improved.    12/20/24 Patient here for follow up visit. SNF employee with her today. Patient seems to be aware and answering questions appropriately. Wound care is performed by nursing home. She denies nausea, vomiting, chills, fever, diarrhea, dysuria. On room air without apparent distress. No complaints today.          Past Medical History:   Diagnosis Date    Arthritis     Diabetes mellitus     Hyperlipidemia     Hypertension        Past Surgical History:   Procedure Laterality Date    CARDIAC SURGERY      \"repaired a heart valve\"    CATARACT EXTRACTION      COLONOSCOPY      REPLACEMENT TOTAL KNEE Bilateral     WOUND DEBRIDEMENT N/A 12/5/2024    Procedure: DEBRIDEMENT SACRAL ULCER/WOUND;  Surgeon: Mehran Boyd MD;  Location: [facility];  Service: General;  Laterality: N/A;       Social History     Socioeconomic History    Marital status: Unknown   Tobacco Use    Smoking status: Former     Current packs/day: 0.00     Average packs/day: 0.3 packs/day for 10.0 years (2.5 ttl pk-yrs)     Types: Cigarettes     Start date: 2004     Quit date: 2014     Years since quitting: 10.9    Smokeless tobacco: Never   Vaping Use    Vaping status: Never Used   Substance and Sexual Activity    Alcohol use: No    Drug use: Never    Sexual activity: Defer       Family History  family history includes Breast cancer in an other family member; Ovarian cancer in her sister.    Immunization History   Administered Date(s) Administered    COVID-19 (PFIZER) Purple Cap Monovalent 03/16/2021, 04/06/2021    Fluzone (or Fluarix & Flulaval for VFC) >6mos 11/12/2021    Fluzone High-Dose 65+YRS 01/26/2021    Influenza Seasonal Injectable 11/15/2017    Influenza, Unspecified 08/01/2021    Pneumococcal Conjugate 13-Valent (PCV13) 11/15/2017        Allergies  Allergies   Allergen Reactions    " Penicillins Rash     Beta lactam allergy details  Antibiotic reaction: rash  Age at reaction: child  Dose to reaction time: unknown  Reason for antibiotic: unknown  Epinephrine required for reaction?: unknown  Tolerated antibiotics: amoxicillin, unknown           The medication list has been reviewed and updated.   Current Medications    Current Outpatient Medications:     amLODIPine (NORVASC) 5 MG tablet, Take 1 tablet by mouth Daily., Disp: , Rfl:     apixaban (ELIQUIS) 2.5 MG tablet tablet, Take 1 tablet by mouth 2 (Two) Times a Day., Disp: , Rfl:     ascorbic acid (VITAMIN C) 500 MG tablet, Take 1 tablet by mouth 2 (Two) Times a Day., Disp: , Rfl:     cefTRIAXone 2,000 mg in sodium chloride 0.9 % 100 mL IVPB, Infuse 2,000 mg into a venous catheter Daily for 36 doses. Indications: Bacteria in the Blood, Infection of the Skin and/or Soft Tissue, Urinary Tract Infection, Disp: , Rfl:     collagenase 250 UNIT/GM ointment, Apply 1 Application topically to the appropriate area as directed Every Night., Disp: , Rfl:     Dextran 70-Hypromellose 0.1-0.3 % solution, Apply 2 drops to eye(s) as directed by provider 2 (Two) Times a Day., Disp: , Rfl:     erythromycin (ROMYCIN) 5 MG/GM ophthalmic ointment, Administer 1 Application to both eyes Every Night., Disp: , Rfl:     Mouthwashes (Biotene dry mouth) liquid liquid, Take 15 mL by mouth Every 12 (Twelve) Hours As Needed for Dry Mouth., Disp: , Rfl:     ondansetron (ZOFRAN) 4 MG tablet, Take 1 tablet by mouth Every 6 (Six) Hours As Needed for Nausea or Vomiting., Disp: , Rfl:     senna 8.6 MG tablet, Take 2 tablets by mouth 2 (Two) Times a Day., Disp: , Rfl:     sertraline (ZOLOFT) 100 MG tablet, Take 1 tablet by mouth Daily., Disp: , Rfl:     SITagliptin (JANUVIA) 100 MG tablet, Take 1 tablet by mouth Daily., Disp: , Rfl:     vancomycin 750 mg/250 mL 0.9% NS IVPB ([facility]), Infuse 250 mL into a venous catheter Daily for 37 doses. Indications: Infection of the Skin and/or Soft  "Tissue, Disp: , Rfl:     White Petrolatum-Mineral Oil (Systane Nighttime) ointment ophthalmic ointment, Administer 1 Application to both eyes Every 12 (Twelve) Hours As Needed (dry eyes)., Disp: , Rfl:     zinc sulfate (ZINCATE) 220 (50 Zn) MG capsule, Take 1 capsule by mouth 2 (Two) Times a Day., Disp: , Rfl:       Review of Systems    Review of Systems   Constitutional:  Negative for activity change, appetite change, chills, diaphoresis, fatigue and fever.   HENT:  Negative for congestion, dental problem, drooling, ear discharge, ear pain, facial swelling, postnasal drip, sinus pressure, sore throat and swollen glands.    Eyes:  Negative for blurred vision, double vision, pain, discharge and itching.   Respiratory:  Negative for apnea, cough, choking, chest tightness and shortness of breath.    Cardiovascular:  Negative for chest pain, palpitations and leg swelling.   Gastrointestinal:  Negative for abdominal distention, abdominal pain, diarrhea, nausea, rectal pain and vomiting.   Genitourinary:  Negative for difficulty urinating, dysuria, flank pain, frequency, hematuria, pelvic pain and pelvic pressure.   Neurological:  Negative for dizziness, tremors, seizures, syncope, speech difficulty and confusion.   Psychiatric/Behavioral:  Negative for agitation and behavioral problems.         Objective     Vital Signs:  /55 (BP Location: Left arm, Patient Position: Sitting, Cuff Size: Adult)   Pulse 75   Resp 18   Wt 67.8 kg (149 lb 6.4 oz)   SpO2 97%   BMI 21.44 kg/m²   Estimated body mass index is 21.44 kg/m² as calculated from the following:    Height as of 12/3/24: 177.8 cm (70\").    Weight as of this encounter: 67.8 kg (149 lb 6.4 oz).    Physical Exam  Constitutional:       General: She is not in acute distress.     Appearance: Normal appearance. She is not ill-appearing, toxic-appearing or diaphoretic.   HENT:      Head: Normocephalic and atraumatic.      Nose: No congestion or rhinorrhea.      " "Mouth/Throat:      Pharynx: Oropharynx is clear. No oropharyngeal exudate or posterior oropharyngeal erythema.   Cardiovascular:      Rate and Rhythm: Normal rate and regular rhythm.      Heart sounds: No murmur heard.  Pulmonary:      Effort: No respiratory distress.      Breath sounds: No stridor. No wheezing, rhonchi or rales.   Chest:      Chest wall: No tenderness.   Abdominal:      General: There is no distension.      Tenderness: There is no abdominal tenderness. There is no right CVA tenderness, left CVA tenderness, guarding or rebound.   Musculoskeletal:         General: No swelling, tenderness or signs of injury.      Cervical back: No rigidity or tenderness.   Skin:     Coloration: Skin is not jaundiced or pale.      Findings: No bruising, erythema or rash.   Neurological:      General: No focal deficit present.      Mental Status: She is alert. Mental status is at baseline.   Psychiatric:         Mood and Affect: Mood normal.         Behavior: Behavior normal.          Result Review :  The following data was reviewed by Yolanda Youssef PA-C     Lab Results  Lab Results   Component Value Date    WBC 13.45 (H) 12/16/2024    HGB 9.3 (L) 12/16/2024    HCT 29.6 (L) 12/16/2024    MCV 85.5 12/16/2024     12/16/2024     Lab Results   Component Value Date    GLUCOSE 99 12/16/2024    BUN 12 12/16/2024    CREATININE 0.81 12/16/2024    BCR 14.8 12/16/2024    K 3.6 12/16/2024    CO2 32.4 (H) 12/16/2024    CALCIUM 8.1 (L) 12/16/2024    ALBUMIN 2.7 (L) 12/16/2024    AST 18 12/16/2024    ALT 6 12/16/2024      Lab Results   Component Value Date    CRP 4.19 (H) 12/16/2024        No results found for: \"ACANTHNAEG\", \"AFBCX\", \"BPERTUSSISCX\", \"BLOODCX\"  No results found for: \"BCIDPCR\", \"CXREFLEX\", \"CSFCX\", \"CULTURETIS\"  No results found for: \"CULTURES\", \"HSVCX\", \"URCX\"  No results found for: \"EYECULTURE\", \"GCCX\", \"HSVCULTURE\", \"LABHSV\"  No results found for: \"LEGIONELLA\", \"MRSACX\", \"MUMPSCX\", \"MYCOPLASCX\"  No " "results found for: \"NOCARDIACX\", \"STOOLCX\"  No results found for: \"THROATCX\", \"UNSTIMCULT\", \"URINECX\", \"CULTURE\", \"VZVCULTUR\"  No results found for: \"VIRALCULTU\", \"WOUNDCX\"    Radiology Results  XR Chest 1 View    Result Date: 12/7/2024  Impression: Worsening left basilar opacity which may reflect atelectasis or pneumonia.   This report was finalized on 12/7/2024 11:00 AM by Diana Ozuna M.D..      MRI Pelvis Without Contrast    Result Date: 12/5/2024  Impression: Findings consistent with osteomyelitis involving the coccyx.      This report was finalized on 12/5/2024 10:55 AM by Diana Ozuna M.D..      XR Chest 1 View    Result Date: 12/3/2024  Impression: No acute cardiopulmonary process.   This report was finalized on 12/3/2024 4:00 PM by Diana Ozuna M.D..              Assessment / Plan        Diagnoses and all orders for this visit:    1. Sacral osteomyelitis (Primary)  -     C-reactive Protein; Future  -     CBC & Differential; Future  -     Comprehensive Metabolic Panel; Future  -     Blood Culture - Blood,; Future  -     Blood Culture - Blood,; Future    2. Bacteremia due to group B Streptococcus    3. Acute cystitis without hematuria      Patient is currently receiving Vancomycin IV pharmacy to dose and Rocephin through 1/15/25 in the setting of sacral osteomyelitis. CBC, CRP, CMP and blood cultures have been collected today. Patient will follow up in 1 week.           Follow Up   No follow-ups on file.    Visit Diagnoses:    ICD-10-CM ICD-9-CM   1. Sacral osteomyelitis  M46.28 730.28   2. Bacteremia due to group B Streptococcus  R78.81 790.7    B95.1 041.02   3. Acute cystitis without hematuria  N30.00 595.0       Patient was given instructions and counseling regarding her condition or for health maintenance advice. Please see specific information pulled into the AVS if appropriate.     This document has been electronically signed by Yolanda Youssef PA-C   December 20, 2024 10:33 " EST      No orders of the defined types were placed in this encounter.     Dictated Utilizing Dragon Dictation: Part of this note may be an electronic transcription/translation of spoken language to printed text using the Dragon Dictation System.

## 2024-12-20 NOTE — PROGRESS NOTES
Patients infection markers were elevated so we have discontinued Rocephin and initiated Invanz 1g daily through 1/15/25. Ordered have been faxed to the nursing home.

## 2024-12-25 LAB
BACTERIA SPEC AEROBE CULT: NORMAL
BACTERIA SPEC AEROBE CULT: NORMAL

## 2024-12-27 PROBLEM — A41.9 SEPSIS: Status: ACTIVE | Noted: 2024-01-01

## 2024-12-27 NOTE — PLAN OF CARE
Problem: Adult Inpatient Plan of Care  Goal: Plan of Care Review  Outcome: Progressing  Flowsheets (Taken 12/27/2024 [id number])  Progress: no change  Plan of Care Reviewed With: patient  Goal: Patient-Specific Goal (Individualized)  Outcome: Progressing  Goal: Absence of Hospital-Acquired Illness or Injury  Outcome: Progressing  Goal: Optimal Comfort and Wellbeing  Outcome: Progressing  Goal: Readiness for Transition of Care  Outcome: Progressing     Problem: Electrolyte Imbalance  Goal: Electrolyte Balance  Outcome: Progressing     Problem: Fall Injury Risk  Goal: Absence of Fall and Fall-Related Injury  Outcome: Progressing     Problem: Skin Injury Risk Increased  Goal: Skin Health and Integrity  Outcome: Progressing     Problem: Sepsis/Septic Shock  Goal: Optimal Coping  Outcome: Progressing  Goal: Absence of Bleeding  Outcome: Progressing  Goal: Blood Glucose Level Within Target Range  Outcome: Progressing  Goal: Absence of Infection Signs and Symptoms  Outcome: Progressing  Goal: Optimal Nutrition Delivery  Outcome: Progressing   Goal Outcome Evaluation:  Plan of Care Reviewed With: patient        Progress: no change

## 2024-12-27 NOTE — ED PROVIDER NOTES
"Subjective   History of Present Illness  89-year-old female here today for altered mental status.  Patient unable to give history.  Per nursing home, patient started having altered mental status yesterday, this morning it was much worse.  Normally she is conversant, today was not talking or was unintelligible.  They report that she has recently been on vancomycin for sepsis and had a normal trough yesterday.      Review of Systems   Unable to perform ROS: Mental status change       Past Medical History:   Diagnosis Date    Arthritis     Diabetes mellitus     Hyperlipidemia     Hypertension        Allergies   Allergen Reactions    Penicillins Rash     Beta lactam allergy details  Antibiotic reaction: rash  Age at reaction: child  Dose to reaction time: unknown  Reason for antibiotic: unknown  Epinephrine required for reaction?: unknown  Tolerated antibiotics: amoxicillin, unknown           Past Surgical History:   Procedure Laterality Date    CARDIAC SURGERY      \"repaired a heart valve\"    CATARACT EXTRACTION      COLONOSCOPY      REPLACEMENT TOTAL KNEE Bilateral     WOUND DEBRIDEMENT N/A 12/5/2024    Procedure: DEBRIDEMENT SACRAL ULCER/WOUND;  Surgeon: Mehran Boyd MD;  Location: [facility];  Service: General;  Laterality: N/A;       Family History   Problem Relation Age of Onset    Ovarian cancer Sister     Breast cancer Other        Social History     Socioeconomic History    Marital status: Unknown   Tobacco Use    Smoking status: Former     Current packs/day: 0.00     Average packs/day: 0.3 packs/day for 10.0 years (2.5 ttl pk-yrs)     Types: Cigarettes     Start date: 2004     Quit date: 2014     Years since quitting: 10.9    Smokeless tobacco: Never   Vaping Use    Vaping status: Never Used   Substance and Sexual Activity    Alcohol use: No    Drug use: Never    Sexual activity: Defer           Objective   Physical Exam  Vitals and nursing note reviewed. Exam conducted with a chaperone present. "   Constitutional:       Appearance: Normal appearance. She is normal weight.   HENT:      Head: Normocephalic and atraumatic.      Mouth/Throat:      Mouth: Mucous membranes are dry.      Comments: Oral retained food, consistent with chopped hartman.  Cardiovascular:      Rate and Rhythm: Normal rate and regular rhythm.      Heart sounds: Normal heart sounds. No murmur heard.     No friction rub. No gallop.   Pulmonary:      Effort: Pulmonary effort is normal. No respiratory distress.      Breath sounds: Normal breath sounds. No wheezing, rhonchi or rales.   Chest:      Chest wall: No tenderness.   Abdominal:      General: Abdomen is flat. Bowel sounds are normal. There is no distension.      Palpations: Abdomen is soft.      Tenderness: There is no abdominal tenderness.   Musculoskeletal:         General: Normal range of motion.      Right lower leg: No edema.      Left lower leg: No edema.   Skin:     General: Skin is warm and dry.   Neurological:      Mental Status: She is alert.      Comments: Patient awake, alert.  Answers questions, but speech is unintelligible.  Moves all 4 extremities.  Follows commands.  Some apparent hearing loss.   Psychiatric:         Mood and Affect: Mood normal.         Behavior: Behavior normal.         Procedures  Results for orders placed or performed during the hospital encounter of 12/27/24   ECG 12 Lead Altered Mental Status    Collection Time: 12/27/24  9:03 AM   Result Value Ref Range    QT Interval 562 ms    QTC Interval 527 ms   Blood Gas, Arterial With Co-Ox    Collection Time: 12/27/24  9:21 AM    Specimen: Arterial Blood   Result Value Ref Range    Site Left Brachial     Panfilo's Test N/A     pH, Arterial 7.380 7.350 - 7.450 pH units    pCO2, Arterial 48.8 (H) 35.0 - 45.0 mm Hg    pO2, Arterial 70.5 (L) 83.0 - 108.0 mm Hg    HCO3, Arterial 28.9 (H) 20.0 - 26.0 mmol/L    Base Excess, Arterial 3.2 (H) 0.0 - 2.0 mmol/L    O2 Saturation, Arterial 93.6 (L) 94.0 - 99.0 %     Hemoglobin, Blood Gas 9.6 (L) 13.5 - 17.5 g/dL    Hematocrit, Blood Gas 29.3 (L) 38.0 - 51.0 %    Oxyhemoglobin 91.9 (L) 94 - 99 %    Methemoglobin 0.40 0.00 - 3.00 %    Carboxyhemoglobin 1.4 0 - 5 %    A-a DO2 17.4 0.0 - 300.0 mmHg    CO2 Content 30.4 22 - 33 mmol/L    Barometric Pressure for Blood Gas 728 mmHg    Modality Room Air     FIO2 21 %    Ventilator Mode NA     Collected by [id number]     pH, Temp Corrected      pCO2, Temperature Corrected      pO2, Temperature Corrected     Urinalysis With Culture If Indicated - Urine, Catheter    Collection Time: 12/27/24  9:29 AM    Specimen: Urine, Catheter   Result Value Ref Range    Color, UA Dark Yellow (A) Yellow, Straw    Appearance, UA Clear Clear    pH, UA <=5.0 5.0 - 8.0    Specific Gravity, UA 1.020 1.005 - 1.030    Glucose, UA Negative Negative    Ketones, UA Trace (A) Negative    Bilirubin, UA Negative Negative    Blood, UA Negative Negative    Protein, UA 30 mg/dL (1+) (A) Negative    Leuk Esterase, UA Negative Negative    Nitrite, UA Negative Negative    Urobilinogen, UA 0.2 E.U./dL 0.2 - 1.0 E.U./dL   Urinalysis, Microscopic Only - Urine, Catheter    Collection Time: 12/27/24  9:29 AM    Specimen: Urine, Catheter   Result Value Ref Range    RBC, UA 0-2 None Seen, 0-2 /HPF    WBC, UA 0-2 None Seen, 0-2 /HPF    Bacteria, UA None Seen None Seen /HPF    Squamous Epithelial Cells, UA 0-2 None Seen, 0-2 /HPF    Hyaline Casts, UA None Seen None Seen /LPF    Methodology Automated Microscopy    COVID-19,CEPHEID/JACLYN,COR/JATIN/PAD/SARINA/LAG/FATOUMATA IN-HOUSE,NP SWAB IN TRANSPORT MEDIA 1 HR TAT, RT-PCR - Swab, Nasopharynx    Collection Time: 12/27/24  9:42 AM    Specimen: Nasopharynx; Swab   Result Value Ref Range    COVID19 Not Detected Not Detected - Ref. Range   Comprehensive Metabolic Panel    Collection Time: 12/27/24  9:42 AM    Specimen: Arm, Left; Blood   Result Value Ref Range    Glucose 101 (H) 65 - 99 mg/dL    BUN 51 (H) 8 - 23 mg/dL    Creatinine 1.18 (H) 0.57 -  1.00 mg/dL    Sodium 154 (H) 136 - 145 mmol/L    Potassium 2.7 (L) 3.5 - 5.2 mmol/L    Chloride 112 (H) 98 - 107 mmol/L    CO2 27.4 22.0 - 29.0 mmol/L    Calcium 8.1 (L) 8.6 - 10.5 mg/dL    Total Protein 5.9 (L) 6.0 - 8.5 g/dL    Albumin 3.0 (L) 3.5 - 5.2 g/dL    ALT (SGPT) 18 1 - 33 U/L    AST (SGOT) 33 (H) 1 - 32 U/L    Alkaline Phosphatase 118 (H) 39 - 117 U/L    Total Bilirubin <0.2 0.0 - 1.2 mg/dL    Globulin 2.9 gm/dL    A/G Ratio 1.0 g/dL    BUN/Creatinine Ratio 43.2 (H) 7.0 - 25.0    Anion Gap 14.6 5.0 - 15.0 mmol/L    eGFR 44.5 (L) >60.0 mL/min/1.73   High Sensitivity Troponin T    Collection Time: 12/27/24  9:42 AM    Specimen: Arm, Left; Blood   Result Value Ref Range    HS Troponin T 54 (C) <14 ng/L   Lactic Acid, Plasma    Collection Time: 12/27/24  9:42 AM    Specimen: Arm, Left; Blood   Result Value Ref Range    Lactate 1.0 0.5 - 2.0 mmol/L   Magnesium    Collection Time: 12/27/24  9:42 AM    Specimen: Arm, Left; Blood   Result Value Ref Range    Magnesium 1.1 (L) 1.6 - 2.4 mg/dL   CBC Auto Differential    Collection Time: 12/27/24  9:42 AM    Specimen: Arm, Left; Blood   Result Value Ref Range    WBC 6.27 3.40 - 10.80 10*3/mm3    RBC 3.54 (L) 3.77 - 5.28 10*6/mm3    Hemoglobin 9.5 (L) 12.0 - 15.9 g/dL    Hematocrit 31.3 (L) 34.0 - 46.6 %    MCV 88.4 79.0 - 97.0 fL    MCH 26.8 26.6 - 33.0 pg    MCHC 30.4 (L) 31.5 - 35.7 g/dL    RDW 19.1 (H) 12.3 - 15.4 %    RDW-SD 60.6 (H) 37.0 - 54.0 fl    MPV 11.7 6.0 - 12.0 fL    Platelets 281 140 - 450 10*3/mm3    Neutrophil % 67.8 42.7 - 76.0 %    Lymphocyte % 21.4 19.6 - 45.3 %    Monocyte % 6.9 5.0 - 12.0 %    Eosinophil % 2.9 0.3 - 6.2 %    Basophil % 0.2 0.0 - 1.5 %    Immature Grans % 0.8 (H) 0.0 - 0.5 %    Neutrophils, Absolute 4.26 1.70 - 7.00 10*3/mm3    Lymphocytes, Absolute 1.34 0.70 - 3.10 10*3/mm3    Monocytes, Absolute 0.43 0.10 - 0.90 10*3/mm3    Eosinophils, Absolute 0.18 0.00 - 0.40 10*3/mm3    Basophils, Absolute 0.01 0.00 - 0.20 10*3/mm3     Immature Grans, Absolute 0.05 0.00 - 0.05 10*3/mm3    nRBC 0.0 0.0 - 0.2 /100 WBC   TSH    Collection Time: 12/27/24  9:42 AM    Specimen: Arm, Left; Blood   Result Value Ref Range    TSH 2.830 0.270 - 4.200 uIU/mL   Heparin Anti-Xa    Collection Time: 12/27/24 10:40 AM    Specimen: Blood   Result Value Ref Range    Heparin Anti-Xa (UFH) >1.10 (C) 0.30 - 0.70 IU/ml   Protime-INR    Collection Time: 12/27/24 10:40 AM    Specimen: Blood   Result Value Ref Range    Protime 20.9 (H) 12.1 - 14.7 Seconds    INR 1.79 (H) 0.90 - 1.10   aPTT    Collection Time: 12/27/24 10:40 AM    Specimen: Blood   Result Value Ref Range    PTT 52.2 (H) 26.5 - 34.5 seconds     CT Head Without Contrast    Result Date: 12/27/2024  Narrative: EXAM:   CT Head Without Intravenous Contrast  EXAM DATE:   12/27/2024 9:34 AM  CLINICAL HISTORY:   AMS  TECHNIQUE:   Axial computed tomography images of the head/brain without intravenous contrast.  Sagittal and coronal reformatted images were created and reviewed.  This CT exam was performed using one or more of the following dose reduction techniques:  automated exposure control, adjustment of the mA and/or kV according to patient size, and/or use of iterative reconstruction technique.  COMPARISON:   No relevant prior studies available.  FINDINGS:   Brain and extra-axial spaces:  Age-related generalized cerebral atrophy.  Advanced chronic small vessel ischemic disease.  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  No hemorrhage.  No CT evidence of acute intracranial abnormality or acute infarct.   Bones/joints:  Unremarkable.  No acute fracture.   Soft tissues:  Unremarkable.   Sinuses:  Unremarkable as visualized.  No acute sinusitis.   Mastoid air cells:  Unremarkable as visualized.  No mastoid effusion.      Impression: 1.  No CT evidence of acute intracranial abnormality or acute infarct. 2.  Age-related generalized cerebral atrophy. 3.  Advanced chronic small vessel ischemic disease.  This  report was finalized on 12/27/2024 10:45 AM by Dr. Markus Cooper MD.      XR Chest 1 View    Result Date: 12/7/2024  Narrative: PROCEDURE: XR CHEST 1 VW-   HISTORY: increasing o2 requirement; A41.9-Sepsis, unspecified organism; L89.95-Pressure ulcer of unspecified site, unstageable; N39.0-Urinary tract infection, site not specified  COMPARISON: 12/3/2024.  FINDINGS: A right PICC is in place with the tip in the SVC. The the patient is status post median sternotomy and mitral valve repair. Heart is normal in size. The mediastinum is unremarkable. There is a worsening left basilar opacity. The right lung is clear. There is no pneumothorax. There are no acute osseous abnormalities.      Impression: Worsening left basilar opacity which may reflect atelectasis or pneumonia.   This report was finalized on 12/7/2024 11:00 AM by Diana Ozuna M.D..      MRI Pelvis Without Contrast    Result Date: 12/5/2024  Narrative: PROCEDURE: MRI PELVIS WO CONTRAST-  HISTORY: Rule out osteomyelitis; A41.9-Sepsis, unspecified organism; L89.95-Pressure ulcer of unspecified site, unstageable; N39.0-Urinary tract infection, site not specified  PROCEDURE: Multiplanar multisequence imaging of the pelvis was performed without the use of intravenous contrast.  COMPARISON: None.  FINDINGS: There is a decubitus ulcer overlying the distal sacrum and coccyx. There is marrow edema within the coccyx consistent with osteomyelitis. No discrete abscess is identified. Limited images of the intrapelvic soft tissues are unremarkable. There is mild diffuse body wall edema.       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      This report was finalized on 12/5/2024 10:55 AM by Diana Ozuna M.D..      XR Chest 1 View    Result Date: 12/3/2024  Narrative: PROCEDURE: XR CHEST 1 VW-   HISTORY: Severe Sepsis Protocol  COMPARISON: None.  FINDINGS: The heart is normal in size. Note is made of prior mitral valve repair. The  mediastinum is unremarkable. The lungs are clear. There is no pneumothorax. There are no acute osseous abnormalities.      Impression: No acute cardiopulmonary process.   This report was finalized on 12/3/2024 4:00 PM by Diana Ozuna M.D..              ED Course  ED Course as of 12/27/24 1135   Fri Dec 27, 2024   1008 ECG 12 Lead Altered Mental Status  Sinus bradycardia with frequent PVCs.  Left axis deviation.  Right bundle branch block.  Nonspecific ST T wave changes.  No STEMI.  Abnormal EKG [BC]   1133 Discussed with Dr. Walsh.  Patient admitted, see orders. [BC]      ED Course User Index  [BC] Shayne Hancock MD                                                       Medical Decision Making      Final diagnoses:   Sepsis with encephalopathy and septic shock, due to unspecified organism       ED Disposition  ED Disposition       ED Disposition   Intended Admit    Condition   --    Comment   --               No follow-up provider specified.       Medication List      No changes were made to your prescriptions during this visit.            Shayne Hancock MD  12/27/24 1130

## 2024-12-27 NOTE — PROGRESS NOTES
Pharmacy was consulted for vancomycin dosing for SSTI.  Based on pt parameters will initiate vancomycin at 1g iv q24hrs after the 1250mg x 1 loading dose to target trough levels of 15-20 mcg/mL and estimated steady state AUC of around 500.  Pharmacy will continue to follow and obtain trough level once steady state is achieved.  Thank you.

## 2024-12-27 NOTE — H&P
[facility]    Patient Identification:  Name:  Tsering Alberto  Age:  89 y.o.  Sex:  female  :  1935  MRN:  [id number]   Admit Date: 2024   Visit Number:  [id number]  Room number:  116/16  Primary Care Physician:  Nicolette Colbert MD     Subjective     Chief complaint:    Chief Complaint   Patient presents with    Cold Exposure    Altered Mental Status    Hypotension       History of presenting illness:   Patient is an 89-year-old female with history significant for type 2 diabetes mellitus, hypertension who presented to the ER from local nursing home with reports of altered mental status.  Patient was recently admitted this facility and treated for sacral osteomyelitis and polymicrobial bacteremia.  She was discharged back to the nursing home on vancomycin/ertapenem with course to be completed on 1/15.  Nursing reports patient is usually awake and conversant and can answer questions appropriately.  At the time of my exam, patient is encephalopathic, offers nothing to HPI.  HPI obtained via discussion with son at bedside and ER staff.    Son states that over the last few days she seems to have declined.  She is usually alert and talkative but has been less interactive over the last few days.  Nursing reports she was either mute or speech was unable to be understood.  There is no report of fever, abnormal vitals, vomiting or any other symptoms reported to ER staff.    In the ER, initial temp was 89.4, heart rate 47 and BP 71/37.  She received sepsis fluid bolus, empiric antibiotics.  After bedside discussion with son, son opted to change CODE STATUS to no CPR, limited and wished to forego any aggressive interventions including central venous line placement.    ---------------------------------------------------------------------------------------------------------------------   Review of Systems   Unable to perform ROS: Mental status change  "    ---------------------------------------------------------------------------------------------------------------------   Past Medical History:   Diagnosis Date    Arthritis     Diabetes mellitus     Hyperlipidemia     Hypertension      Past Surgical History:   Procedure Laterality Date    CARDIAC SURGERY      \"repaired a heart valve\"    CATARACT EXTRACTION      COLONOSCOPY      REPLACEMENT TOTAL KNEE Bilateral     WOUND DEBRIDEMENT N/A 12/5/2024    Procedure: DEBRIDEMENT SACRAL ULCER/WOUND;  Surgeon: Mehran Boyd MD;  Location: [facility];  Service: General;  Laterality: N/A;     Family History   Problem Relation Age of Onset    Ovarian cancer Sister     Breast cancer Other      Social History     Socioeconomic History    Marital status: Unknown   Tobacco Use    Smoking status: Former     Current packs/day: 0.00     Average packs/day: 0.3 packs/day for 10.0 years (2.5 ttl pk-yrs)     Types: Cigarettes     Start date: 2004     Quit date: 2014     Years since quitting: 10.9    Smokeless tobacco: Never   Vaping Use    Vaping status: Never Used   Substance and Sexual Activity    Alcohol use: No    Drug use: Never    Sexual activity: Defer     ---------------------------------------------------------------------------------------------------------------------   Allergies:  Penicillins  ---------------------------------------------------------------------------------------------------------------------   Medications below are reported home medications pulling from within the system; at this time, these medications have not been reconciled unless otherwise specified and are in the verification process for further verifcation as current home medications.    Prior to Admission Medications       Prescriptions Last Dose Informant Patient Reported? Taking?    amLODIPine (NORVASC) 5 MG tablet  Nursing Home Yes No    Take 1 tablet by mouth Daily.    apixaban (ELIQUIS) 2.5 MG tablet tablet  Nursing Home Yes No    Take 1 " tablet by mouth 2 (Two) Times a Day.    ascorbic acid (VITAMIN C) 500 MG tablet  Nursing Home Yes No    Take 1 tablet by mouth 2 (Two) Times a Day.    cefTRIAXone 2,000 mg in sodium chloride 0.9 % 100 mL IVPB   No No    Infuse 2,000 mg into a venous catheter Daily for 36 doses. Indications: Bacteria in the Blood, Infection of the Skin and/or Soft Tissue, Urinary Tract Infection    collagenase 250 UNIT/GM ointment  Nursing Home Yes No    Apply 1 Application topically to the appropriate area as directed Every Night.    Dextran 70-Hypromellose 0.1-0.3 % solution  Nursing Home Yes No    Apply 2 drops to eye(s) as directed by provider 2 (Two) Times a Day.    erythromycin (ROMYCIN) 5 MG/GM ophthalmic ointment  Nursing Home Yes No    Administer 1 Application to both eyes Every Night.    Mouthwashes (Biotene dry mouth) liquid liquid  Nursing Home Yes No    Take 15 mL by mouth Every 12 (Twelve) Hours As Needed for Dry Mouth.    ondansetron (ZOFRAN) 4 MG tablet  Nursing Home Yes No    Take 1 tablet by mouth Every 6 (Six) Hours As Needed for Nausea or Vomiting.    senna 8.6 MG tablet  Nursing Home Yes No    Take 2 tablets by mouth 2 (Two) Times a Day.    sertraline (ZOLOFT) 100 MG tablet  Nursing Home Yes No    Take 1 tablet by mouth Daily.    SITagliptin (JANUVIA) 100 MG tablet  Nursing Home Yes No    Take 1 tablet by mouth Daily.    vancomycin 750 mg/250 mL 0.9% NS IVPB ([facility])   No No    Infuse 250 mL into a venous catheter Daily for 37 doses. Indications: Infection of the Skin and/or Soft Tissue    White Petrolatum-Mineral Oil (Systane Nighttime) ointment ophthalmic ointment  Nursing Home Yes No    Administer 1 Application to both eyes Every 12 (Twelve) Hours As Needed (dry eyes).    zinc sulfate (ZINCATE) 220 (50 Zn) MG capsule  Nursing Home Yes No    Take 1 capsule by mouth 2 (Two) Times a Day.          Objective     Vital Signs:  Temp:  [89.4 °F (31.9 °C)-91.7 °F (33.2 °C)] 91.7 °F (33.2 °C)  Heart Rate:  [63-98]  63  Resp:  [14] 14  BP: (71-99)/(49-83) 96/74    Mean Arterial Pressure (Non-Invasive) for the past 24 hrs (Last 3 readings):   Noninvasive MAP (mmHg)   12/27/24 1200 78   12/27/24 0915 87     SpO2:  [97 %-99 %] 97 %  on   ;   Device (Oxygen Therapy): room air  Body mass index is 21.44 kg/m².    Wt Readings from Last 3 Encounters:   12/27/24 67.8 kg (149 lb 6.4 oz)   12/20/24 67.8 kg (149 lb 6.4 oz)   12/09/24 70 kg (154 lb 5.2 oz)      ---------------------------------------------------------------------------------------------------------------------   Physical Exam:  Constitutional: Chronically ill-appearing female, encephalopathic      HENT:  Head: Normocephalic and atraumatic.  Mouth: Dry mucous membranes.    Eyes:  Conjunctivae and EOM are normal.  No scleral icterus.  Neck:  Neck supple.  No JVD present.    Cardiovascular:  Normal rate, regular rhythm and normal heart sounds with no murmur.  Pulmonary/Chest: Coarse breath sounds bilaterally, unlabored, no accessory muscle use  Abdominal:  Soft.  Bowel sounds are normal.  No distension and no tenderness.   Musculoskeletal:  No tenderness, and no deformity.   Neurological: Encephalopathic, does not answer questions appropriately  Skin:  Skin is warm and dry.  No rash noted.  No pallor.   Peripheral vascular:  No edema and pulses on all 4 extremities.    ---------------------------------------------------------------------------------------------------------------------  EKG: Sinus bradycardia, heart rate 53, QTc 527, no acute ST or T wave changes    ECG 12 Lead Altered Mental Status   Preliminary Result   Test Reason : Altered Mental Status   Blood Pressure :   */*   mmHG   Vent. Rate :  53 BPM     Atrial Rate :  62 BPM      P-R Int :   * ms          QRS Dur : 134 ms       QT Int : 562 ms       P-R-T Axes : -54 -56 133 degrees     QTcB Int : 527 ms      Undetermined rhythm   Left axis deviation   Right bundle branch block   Possible Lateral infarct Inferior  "infarct , age undetermined   Abnormal ECG   When compared with ECG of 04-Dec-2024 16:30,   Significant changes have occurred      Referred By: CURD           Confirmed By:           Telemetry: Reviewed    I have personally looked at both the EKG and the telemetry strips.    Last echocardiogram:  None on file    --------------------------------------------------------------------------------------------------------------------  Labs:  Results from last 7 days   Lab Units 12/27/24  1255 12/27/24  1124 12/27/24  1040 12/27/24  0942   LACTATE mmol/L  --   --   --  1.0   SED RATE mm/hr 50*  --   --   --    CRP mg/dL  --  3.95*  --   --    WBC 10*3/mm3  --   --   --  6.27   HEMOGLOBIN g/dL  --   --   --  9.5*   HEMATOCRIT %  --   --   --  31.3*   MCV fL  --   --   --  88.4   MCHC g/dL  --   --   --  30.4*   PLATELETS 10*3/mm3  --   --   --  281   INR   --   --  1.79*  --      Results from last 7 days   Lab Units 12/27/24  0921   PH, ARTERIAL pH units 7.380   PO2 ART mm Hg 70.5*   PCO2, ARTERIAL mm Hg 48.8*   HCO3 ART mmol/L 28.9*     Results from last 7 days   Lab Units 12/27/24  0942   SODIUM mmol/L 154*   POTASSIUM mmol/L 2.7*   MAGNESIUM mg/dL 1.1*   CHLORIDE mmol/L 112*   CO2 mmol/L 27.4   BUN mg/dL 51*   CREATININE mg/dL 1.18*   CALCIUM mg/dL 8.1*   GLUCOSE mg/dL 101*   ALBUMIN g/dL 3.0*   BILIRUBIN mg/dL <0.2   ALK PHOS U/L 118*   AST (SGOT) U/L 33*   ALT (SGPT) U/L 18   Estimated Creatinine Clearance: 35.3 mL/min (A) (by C-G formula based on SCr of 1.18 mg/dL (H)).    No results found for: \"AMMONIA\"  Results from last 7 days   Lab Units 12/27/24  1124 12/27/24  0942   CK TOTAL U/L 98  --    HSTROP T ng/L 47* 54*         No results found for: \"HGBA1C\", \"POCGLU\"  Lab Results   Component Value Date    TSH 2.830 12/27/2024     No results found for: \"PREGTESTUR\", \"PREGSERUM\", \"HCG\", \"HCGQUANT\"  Pain Management Panel           No data to display              Brief Urine Lab Results  (Last result in the past 365 days) " "       Color   Clarity   Blood   Leuk Est   Nitrite   Protein   CREAT   Urine HCG        12/27/24 0929 Dark Yellow   Clear   Negative   Negative   Negative   30 mg/dL (1+)                 No results found for: \"BLOODCX\"  No results found for: \"URINECX\"  No results found for: \"WOUNDCX\"  No results found for: \"STOOLCX\"    I have personally looked at the labs and they are summarized above.  ----------------------------------------------------------------------------------------------------------------------  Detailed radiology reports for the last 24 hours:    Imaging Results (Last 24 Hours)       Procedure Component Value Units Date/Time    CT Chest Without Contrast Diagnostic [[id number]] Resulted: 12/27/24 1120     Updated: 12/27/24 1130    CT Abdomen Pelvis Without Contrast [[id number]] Resulted: 12/27/24 1120     Updated: 12/27/24 1130    CT Head Without Contrast [[id number]] Collected: 12/27/24 1043     Updated: 12/27/24 1047    Narrative:      EXAM:    CT Head Without Intravenous Contrast     EXAM DATE:    12/27/2024 9:34 AM     CLINICAL HISTORY:    AMS     TECHNIQUE:    Axial computed tomography images of the head/brain without intravenous  contrast.  Sagittal and coronal reformatted images were created and  reviewed.  This CT exam was performed using one or more of the following  dose reduction techniques:  automated exposure control, adjustment of  the mA and/or kV according to patient size, and/or use of iterative  reconstruction technique.     COMPARISON:    No relevant prior studies available.     FINDINGS:    Brain and extra-axial spaces:  Age-related generalized cerebral  atrophy.  Advanced chronic small vessel ischemic disease.  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  No hemorrhage.  No CT evidence  of acute intracranial abnormality or acute infarct.    Bones/joints:  Unremarkable.  No acute fracture.    Soft tissues:  Unremarkable.    Sinuses:  Unremarkable as visualized.  No acute sinusitis.   "  Mastoid air cells:  Unremarkable as visualized.  No mastoid effusion.       Impression:      1.  No CT evidence of acute intracranial abnormality or acute infarct.  2.  Age-related generalized cerebral atrophy.  3.  Advanced chronic small vessel ischemic disease.     This report was finalized on 12/27/2024 10:45 AM by Dr. Markus Cooper MD.       XR Chest 1 View [[id number]] Resulted: 12/27/24 0935     Updated: 12/27/24 0956          Final impressions for the last 30 days of radiology reports:    CT Head Without Contrast    Result Date: 12/27/2024  1.  No CT evidence of acute intracranial abnormality or acute infarct. 2.  Age-related generalized cerebral atrophy. 3.  Advanced chronic small vessel ischemic disease.  This report was finalized on 12/27/2024 10:45 AM by Dr. Markus Cooper MD.      XR Chest 1 View    Result Date: 12/7/2024  Worsening left basilar opacity which may reflect atelectasis or pneumonia.   This report was finalized on 12/7/2024 11:00 AM by Diana Ozuna M.D..      MRI Pelvis Without Contrast    Result Date: 12/5/2024  Findings consistent with osteomyelitis involving the coccyx.      This report was finalized on 12/5/2024 10:55 AM by Diana Ozuna M.D..      XR Chest 1 View    Result Date: 12/3/2024  No acute cardiopulmonary process.   This report was finalized on 12/3/2024 4:00 PM by Diana Ozuna M.D..     I have personally looked at the radiology images and read the final radiology report.    Assessment & Plan      Patient is an 89-year-old female with history significant for type 2 diabetes mellitus, hypertension who presented to the ER from local nursing home with reports of altered mental status.    #Acute metabolic encephalopathy  #Septic versus hypovolemic shock  #Possible community-acquired pneumonia versus aspiration with MDR risk factors  #Polymicrobial bacteremia, POA  #Sacral osteomyelitis, POA  --Patient presented w/ reports of encephalopathy  --Admission labs  showed sodium 154, potassium 3.7, CRP 4, sed rate 50  --Blood cultures x 2, sputum culture  --CT bibasilar consolidative disease consistent with pneumonia  --CT abdomen pelvis noted cholelithiasis  --Given prior culture data, resume vancomycin and ertapenem  --Continue Levophed, target MAP greater than 65  --Goals of care discussed with son, will continue medical management as noted above, Levophed via peripheral/PICC if functional otherwise no plans for CVL or escalation of care  --Admit to PCU    #Failure to thrive  #Hypernatremia due to free water deficit  --Initial sodium 154, start D5W, repeat sodium in a.m.  --Discussed with son, no plans for PEG    #Bilateral pleural effusions, monitor  #CAD status post remote CABG  #Elevated INR due to vitamin deficiency, IV vitamin K  #Chronic normocytic anemia  #Hypomagnesemia/hypokalemia    CC 38    Checklist:  Antibiotics: Vancomycin, ertapenem  Steroids: None  DVT ppx: Eliquis  GI ppx: ppi  Diet: N.p.o.  Code: No CPR, limited  Risk/dispo: Patient is a high risk due to acute hypoxic respiratory failure secondary to COVID-19 requiring IV steroids, remdesivir.  Anticipate greater than 2 midnight stay.  Disposition expected Home when medically stable.    Anmol Walsh DO  [facility]  12/27/24  13:18 EST

## 2024-12-27 NOTE — PROGRESS NOTES
[facility] INFUSION  Tsering Alberto is a  89 y.o. female receiving heparin infusion.     Therapy for (VTE/Cardiac):   cardiac  Patient Dosing Weight: 67.8 kg  Initial Bolus (Y/N):   Y  Any Bolus (Y/N):   Y        Signs or Symptoms of Bleeding: N    Cardiac or Other (Not VTE)   Initial Bolus: 60 units/kg (Max 4,000 units)  Initial rate: 12 units/kg/hr (Max 1,000 units/hr)   Anti Xa Rebolus Infusion Hold time Change infusion Dose (Units/kg/hr) Next Anti Xa or aPTT Level Due   < 0.11 50 Units/kg  (4000 Units Max) None Increase by  3 Units/kg/hr 6 hours   0.11- 0.19 25 Units/kg  (2000 Units Max) None Increase by  2 Units/kg/hr 6 hours   0.2 - 0.29 0 None Increase by  1 Units/kg/hr 6 hours   0.3 - 0.5 0 None No Change 6 hours (after 2 consecutive levels in range check q24h @0700)   0.51 - 0.6 0 None Decrease by  1 Units/kg/hr 6 hours   0.61 - 0.8 0 30 Minutes Decrease by  2 Units/kg/hr 6 hours   0.81 - 1 0 60 Minutes Decrease by  3 Units/kg/hr 6 hours   >1 0 Hold  After Anti Xa less than 0.5 decrease previous rate by  4 Units/kg/hr  Every 2 hours until Anti Xa  less than 0.5 then when infusion restarts in 6 hours         Recommend anti-Xa every 6 hours.          Date   Time   Anti-Xa Current Rate (Unit/kg/hr) Bolus   (Units) Rate Change   (Unit/kg/hr) New Rate (Unit/kg/hr) Next   Anti-Xa Comments  Pump Check Daily   12/27 1040 >1.1 - - - hold 1700 Hold until Xa less than 1. Discussed with nurse Roca in ED.                                                                                                                                                                                                                                 Pharmacy will continue to follow anti-Xa results and monitor for signs and symptoms of bleeding or thrombosis.    Jodie Fields, PharmD  12/27/24 11:03 EST

## 2024-12-27 NOTE — ED NOTES
Dr. Walsh at bedside discussing pts wishes with pts son, it is decided that pt will be a DNR/DNI, all questions and concerns addressed, understanding verbalized.

## 2024-12-28 NOTE — PROGRESS NOTES
"    [facility]    Patient Identification:  Name:  Tsering Alberto  Age:  89 y.o.  Sex:  female  :  1935  MRN:  [id number]  Visit number:  [id number]  Primary Care Provider:  Nicolette Colbert MD    Length of stay in inpatient status:  0    Brief Update     Contacted by daytime nurse that the son was unsure about the code status and was thinking about changing it.  Thus, I went to the patient's room to talk to him; also present was nighttime nurse Geeta.  The son states that his mom and dad both had told him that they did not want to lay on life support; his dad  8 years ago and he was not placed on life support.  I went over what CPR encompasses and the son would like to keep the patient DNR/DNI.  However, he would like to keep the Levophed for now.  The patient is restless; the son has approved for me to give prn morphine for pain and shortness of air, as the \"does not want her to suffer\".  Morphine prn written.  Will continue to monitor the patient closely for nonverbal cues of pain and anxiety.    Henrry Quezada MD  [facility]  24  20:21 EST  "

## 2024-12-28 NOTE — PROGRESS NOTES
[facility] HOSPITALIST PROGRESS NOTE     Patient Identification:  Name:  Tsering Alberto  Age:  89 y.o.  Sex:  female  :  1935  MRN:  [id number]  Visit Number:  [id number]  ROOM: [place] [id number]     Primary Care Provider:  Nicolette Colbert MD    Length of stay in inpatient status:  1    Subjective     Chief Compliant:    Chief Complaint   Patient presents with    Cold Exposure    Altered Mental Status    Hypotension       History of Presenting Illness:    Patient seen in follow-up for metabolic encephalopathy, septic shock.  Levophed requirement escalated overnight without improvement in mental status.  Goals of care at bedside and son is opted to pursue comfort measures.    Objective     Current Hospital Meds:Scopolamine, 1 patch, Transdermal, Q72H  sodium chloride, 10 mL, Intravenous, Q12H         Current Antimicrobial Therapy:  Anti-Infectives (From admission, onward)      Ordered     Dose/Rate Route Frequency Start Stop    24 1845  vancomycin 1000 mg/250 mL 0.9% NaCl IVPB  Status:  Discontinued        Ordering Provider: Anmol Walsh DO    1,000 mg  250 mL/hr over 60 Minutes Intravenous Every 24 Hours 24 1200 24 1119    24 1832  ertapenem (INVanz) MBP 1 g in 100 NS  Status:  Discontinued        Ordering Provider: Anmol Walsh DO    1 g  200 mL/hr over 30 Minutes Intravenous Every 24 Hours 24 0900 24 0814    24 0814  ertapenem (INVanz) 1,000 mg in sodium chloride 0.9 % 100 mL IVPB-VTB  Status:  Discontinued        Ordering Provider: Anmol Walsh DO    1,000 mg  200 mL/hr over 30 Minutes Intravenous Every 24 Hours 24 0900 24 1546    24 1832  Pharmacy to dose vancomycin  Status:  Discontinued        Ordering Provider: Anmol Walsh DO     Not Applicable Continuous PRN 24 1832 24 1845    24 0908  vancomycin 1250 mg/250 mL 0.9% NS IVPB ([facility])        Ordering Provider:  Shayne Hancock MD    20 mg/kg × 67.8 kg  over 75 Minutes Intravenous Once 12/27/24 1030 12/27/24 1312    12/27/24 0908  aztreonam (AZACTAM) 2 g in sodium chloride 0.9 % 100 mL IVPB-VTB        Ordering Provider: Shayne Hancock MD    2 g  100 mL/hr over 60 Minutes Intravenous Once 12/27/24 0930 12/27/24 1150          Current Diuretic Therapy:  Diuretics (From admission, onward)      None          ----------------------------------------------------------------------------------------------------------------------  Vital Signs:  Temp:  [96.3 °F (35.7 °C)-99.8 °F (37.7 °C)] 98.6 °F (37 °C)  Heart Rate:  [61-85] 72  Resp:  [13-38] 20  BP: ()/(29-95) 85/29  SpO2:  [88 %-100 %] 100 %  on  Flow (L/min) (Oxygen Therapy):  [2] 2;   Device (Oxygen Therapy): nasal cannula  Body mass index is 22.17 kg/m².    Wt Readings from Last 3 Encounters:   12/28/24 70.1 kg (154 lb 8.7 oz)   12/20/24 67.8 kg (149 lb 6.4 oz)   12/09/24 70 kg (154 lb 5.2 oz)     Intake & Output (last 3 days)         12/25 0701 12/26 0700 12/26 0701 12/27 0700 12/27 0701 12/28 0700 12/28 0701 12/29 0700    I.V. (mL/kg)   100 (1.5)     IV Piggyback   350     Total Intake(mL/kg)   450 (6.6)     Urine (mL/kg/hr)   275     Total Output   275     Net   +175                   NPO Diet NPO Type: Strict NPO  ----------------------------------------------------------------------------------------------------------------------  Physical exam:  Constitutional: Chronically ill-appearing female, lethargic   HENT:  Head: Normocephalic and atraumatic.  Mouth: Dry mucous membranes.    Eyes:  Conjunctivae are normal.  No scleral icterus.  Cardiovascular:  Normal rate, regular rhythm and normal heart sounds with no murmur.  Pulmonary/Chest: Coarse breath sounds bilaterally, unlabored  Abdominal:  Soft.  Bowel sounds are normal.  Musculoskeletal:  No tenderness, and no deformity.   Neurological: Lethargic  Skin:  Skin is warm and dry.  No rash noted.  No pallor.  "  Peripheral vascular:  No edema and pulses on all 4 extremities.    ----------------------------------------------------------------------------------------------------------------------  Results from last 7 days   Lab Units 12/28/24  0027 12/27/24  1124 12/27/24  1040 12/27/24  0942   CRP mg/dL  --  3.95*  --   --    LACTATE mmol/L 1.4  --   --  1.0   WBC 10*3/mm3 8.99  --   --  6.27   HEMOGLOBIN g/dL 9.3*  --   --  9.5*   HEMATOCRIT % 29.9*  --   --  31.3*   MCV fL 87.9  --   --  88.4   MCHC g/dL 31.1*  --   --  30.4*   PLATELETS 10*3/mm3 311  --   --  281   INR  1.85*  --  1.79*  --      Results from last 7 days   Lab Units 12/27/24  0921   PH, ARTERIAL pH units 7.380   PO2 ART mm Hg 70.5*   PCO2, ARTERIAL mm Hg 48.8*   HCO3 ART mmol/L 28.9*     Results from last 7 days   Lab Units 12/28/24  0027 12/27/24  0942   SODIUM mmol/L 152* 154*   POTASSIUM mmol/L 3.8 2.7*   MAGNESIUM mg/dL  --  1.1*   CHLORIDE mmol/L 115* 112*   CO2 mmol/L 23.4 27.4   BUN mg/dL 49* 51*   CREATININE mg/dL 1.32* 1.18*   CALCIUM mg/dL 7.4* 8.1*   GLUCOSE mg/dL 85 101*   ALBUMIN g/dL 2.8* 3.0*   BILIRUBIN mg/dL 0.2 <0.2   ALK PHOS U/L 118* 118*   AST (SGOT) U/L 35* 33*   ALT (SGPT) U/L 18 18   Estimated Creatinine Clearance: 32.6 mL/min (A) (by C-G formula based on SCr of 1.32 mg/dL (H)).  No results found for: \"AMMONIA\"  Results from last 7 days   Lab Units 12/27/24  1124 12/27/24  0942   CK TOTAL U/L 98  --    HSTROP T ng/L 47* 54*             No results found for: \"HGBA1C\", \"POCGLU\"  Lab Results   Component Value Date    TSH 2.830 12/27/2024     No results found for: \"PREGTESTUR\", \"PREGSERUM\", \"HCG\", \"HCGQUANT\"  Pain Management Panel           No data to display              Brief Urine Lab Results  (Last result in the past 365 days)        Color   Clarity   Blood   Leuk Est   Nitrite   Protein   CREAT   Urine HCG        12/27/24 0929 Dark Yellow   Clear   Negative   Negative   Negative   30 mg/dL (1+)                 Blood Culture " "  Date Value Ref Range Status   12/27/2024 No growth at 24 hours  Preliminary   12/27/2024 Abnormal Stain (C)  Preliminary     No results found for: \"URINECX\"  No results found for: \"WOUNDCX\"  No results found for: \"STOOLCX\"  No results found for: \"RESPCX\"  No results found for: \"AFBCX\"  Results from last 7 days   Lab Units 12/28/24  0027 12/27/24  1255 12/27/24  1124 12/27/24  0942   PROCALCITONIN ng/mL  --  0.08  --   --    LACTATE mmol/L 1.4  --   --  1.0   SED RATE mm/hr  --  50*  --   --    CRP mg/dL  --   --  3.95*  --        I have personally looked at the labs and they are summarized above.  ----------------------------------------------------------------------------------------------------------------------  Detailed radiology reports for the last 24 hours:  Imaging Results (Last 24 Hours)       ** No results found for the last 24 hours. **          Assessment & Plan      Patient is an 89-year-old female with history significant for type 2 diabetes mellitus, hypertension who presented to the ER from local nursing home with reports of altered mental status.     #Comfort measures  #Failure to thrive  #Hypernatremia due to free water deficit  #Acute metabolic encephalopathy  #Septic shock  #Community-acquired pneumonia versus aspiration with MDR risk factors  #Polymicrobial bacteremia, POA  #Sacral osteomyelitis, POA  #Coag negative staph bacteremia, contaminant  #Bilateral pleural effusions, monitor  #CAD status post remote CABG  #Elevated INR due to vitamin deficiency, IV vitamin K  #Chronic normocytic anemia  #Hypomagnesemia/hypokalemia    --Patient presented w/ reports of encephalopathy over the last few days from a local nursing facility.  Had recently been hospitalized with sacral osteomyelitis and had been on long-term antibiotics that have been scheduled to complete on 1/15  --Admission labs showed sodium 154, potassium 3.7, CRP 4, sed rate 50  --Blood cultures negative staph, suspected " contaminant  --CT bibasilar consolidative disease consistent with pneumonia  --CT abdomen pelvis noted cholelithiasis without cholecystitis  --She was initially resumed on vancomycin and ertapenem given prior culture data, required levo due to persistent shock that was refractory to appropriate volume resuscitation.  She was placed on D5W due to free water deficit and hypernatremia which was felt to have been playing a role in encephalopathy.  --Goals of care discussed at length today bedside with patient's son and family present with nursing.  Son opted to transition to comfort measures  --As needed Ativan, morphine, Robinul for symptom relief  --Please do not consult wound care for any pressure injuries as patient is comfort care and should be made comfortable and allowed to pass with dignity.  --Patient also does not need oral care, turns or any other nursing driven protocol that would not be aligned with prioritizing patient comfort unless requested by family.  --Would keep on PCU for now given her active decline, can move to [facility] later for comfort measures if she is stable    Checklist:  Antibiotics: none  Steroids: None  DVT ppx: None  GI ppx: None  Diet: N.p.o.  Code: No CPR, comfort  Risk/dispo: Patient transition to comfort measures, anticipate celestial discharge    Anmol Walsh DO  [facility]  12/28/24  17:51 EST

## 2024-12-28 NOTE — PLAN OF CARE
Goal Outcome Evaluation:  Plan of Care Reviewed With: patient           Problem: Adult Inpatient Plan of Care  Goal: Plan of Care Review  Outcome: Progressing  Flowsheets (Taken 12/28/2024 0151)  Plan of Care Reviewed With: patient  Goal: Patient-Specific Goal (Individualized)  Outcome: Progressing  Goal: Absence of Hospital-Acquired Illness or Injury  Outcome: Progressing  Intervention: Identify and Manage Fall Risk  Description: Perform standard risk assessment on admission using a validated tool or comprehensive approach appropriate to the patient; reassess fall risk frequently, with change in status or transfer to another level of care.  Communicate risk to interprofessional healthcare team; ensure fall risk visible cue.  Determine need for increased observation, equipment and environmental modification, as well as use of supportive, nonskid footwear.  Adjust safety measures to individual needs and identified risk factors.  Reinforce the importance of active participation with fall risk prevention, safety, and physical activity with the patient and family.  Perform regular intentional rounding to assess need for position change, pain assessment and personal needs, including assistance with toileting.  Recent Flowsheet Documentation  Taken 12/27/2024 2300 by Geeta Soni RN  Safety Promotion/Fall Prevention: safety round/check completed  Taken 12/27/2024 2100 by Geeta Soni RN  Safety Promotion/Fall Prevention: safety round/check completed  Taken 12/27/2024 2020 by Geeta Soni RN  Safety Promotion/Fall Prevention: safety round/check completed  Taken 12/27/2024 1900 by Geeta Soni RN  Safety Promotion/Fall Prevention: safety round/check completed  Goal: Optimal Comfort and Wellbeing  Outcome: Progressing  Intervention: Monitor Pain and Promote Comfort  Description: Assess pain level, treatment efficacy and patient response at regular intervals using a consistent pain scale.  Consider the  presence and impact of preexisting chronic pain.  Encourage patient and caregiver involvement in pain assessment, interventions and safety measures.  Promote activity; balance with sleep and rest to enhance healing.  Recent Flowsheet Documentation  Taken 12/27/2024 2316 by Geeta Soni RN  Pain Management Interventions: pain medication given  Taken 12/27/2024 2014 by Geeta Soni RN  Pain Management Interventions: pain medication given  Intervention: Provide Person-Centered Care  Description: Use a family-focused approach to care; encourage support system presence and participation.  Develop trust and rapport by proactively providing information, encouraging questions, addressing concerns and offering reassurance.  Acknowledge emotional response to hospitalization.  Recognize and utilize personal coping strategies and strengths; develop goals via shared decision-making.  Honor spiritual and cultural preferences.  Recent Flowsheet Documentation  Taken 12/27/2024 2020 by Geeta Soni RN  Trust Relationship/Rapport:   care explained   reassurance provided  Goal: Readiness for Transition of Care  Outcome: Progressing     Problem: Electrolyte Imbalance  Goal: Electrolyte Balance  Outcome: Progressing     Problem: Fall Injury Risk  Goal: Absence of Fall and Fall-Related Injury  Outcome: Progressing  Intervention: Identify and Manage Contributors  Description: Develop a fall prevention plan, considering patient-centered interventions and family/caregiver involvement; identify and address patient's facilitators and barriers.  Provide reorientation, appropriate sensory stimulation and routines with changes in mental status to decrease risk of fall.  Promote use of personal vision and auditory aids.  Assess assistance level required for safe and effective self-care; provide support as needed, such as toileting and mobilization. For age 65 and older, implement timed toileting with assistance.  Encourage physical  activity, such as performance of mobility and self-care at highest level of patient ability, multicomponent exercise program and provision of appropriate assistive devices.  If fall occurs, assess the severity of injury; implement fall injury protocol. Determine the cause and revise fall injury prevention plan.  Regularly review and advocate for medication adjustment to decrease fall risk; consider administration times, polypharmacy and age.  Balance adequate pain management with potential for oversedation.  Recent Flowsheet Documentation  Taken 12/27/2024 2300 by Geeta Soni RN  Medication Review/Management: medications reviewed  Taken 12/27/2024 2100 by Geeta Soni RN  Medication Review/Management: medications reviewed  Taken 12/27/2024 2020 by Geeta Soni RN  Medication Review/Management: medications reviewed  Taken 12/27/2024 1900 by Geeta Soni RN  Medication Review/Management: medications reviewed  Intervention: Promote Injury-Free Environment  Description: Provide a safe, barrier-free environment that encourages independent activity.  Keep care area uncluttered and well-lighted.  Determine need for increased observation or monitoring.  Avoid use of devices that minimize mobility, such as restraints or indwelling urinary catheter.  Recent Flowsheet Documentation  Taken 12/27/2024 2300 by Geeta Soni RN  Safety Promotion/Fall Prevention: safety round/check completed  Taken 12/27/2024 2100 by Geeta Soni RN  Safety Promotion/Fall Prevention: safety round/check completed  Taken 12/27/2024 2020 by Geeta Soni RN  Safety Promotion/Fall Prevention: safety round/check completed  Taken 12/27/2024 1900 by Geeta Soni RN  Safety Promotion/Fall Prevention: safety round/check completed     Problem: Skin Injury Risk Increased  Goal: Skin Health and Integrity  Outcome: Progressing  Intervention: Optimize Skin Protection  Description: Perform a full pressure injury risk assessment, as  indicated by screening, upon admission to care unit.  Reassess skin (full inspection and injury risk, including skin temperature, consistency and color) frequently (e.g., scheduled interval, with change in condition) to provide optimal early detection and prevention.  Maintain adequate tissue perfusion (e.g., encourage fluid balance; avoid crossing legs, constrictive clothing or devices) to promote tissue oxygenation.  Maintain head of bed at lowest degree of elevation tolerated, considering medical condition and other restrictions. Use positioning supports to prevent sliding and friction. Consider low friction textiles.  Avoid positioning onto an area that remains reddened or on bony prominences.  Minimize incontinence and moisture (e.g., toileting schedule; moisture-wicking pad, diaper or incontinence collection device; skin moisture barrier).  Cleanse skin promptly and gently, when soiled, utilizing a pH-balanced cleanser.  Relieve and redistribute pressure (e.g., scheduled position changes, weight shifts, use of support surface, medical device repositioning, protective dressing application, use of positioning device, microclimate control, use of pressure-injury-monitor  Encourage increased activity, such as sitting in a chair at the bedside or early mobilization, when able to tolerate. Avoid prolonged sitting.  Recent Flowsheet Documentation  Taken 12/27/2024 2020 by Geeta Soni RN  Pressure Reduction Techniques:   weight shift assistance provided   frequent weight shift encouraged   heels elevated off bed  Pressure Reduction Devices: pressure-redistributing mattress utilized     Problem: Sepsis/Septic Shock  Goal: Optimal Coping  Outcome: Progressing  Goal: Absence of Bleeding  Outcome: Progressing  Goal: Blood Glucose Level Within Target Range  Outcome: Progressing  Goal: Absence of Infection Signs and Symptoms  Outcome: Progressing  Goal: Optimal Nutrition Delivery  Outcome: Progressing     Problem:  Violence Risk or Actual  Goal: Anger and Impulse Control  Outcome: Progressing  Intervention: Minimize Safety Risk  Description: Listen actively, observing verbal and nonverbal cues (e.g., irritability, confusion, lack of cooperation, demanding behavior, body posture, expression); take threats seriously.  Maintain a therapeutic presence; utilize calm, empathetic tone of voice, nonjudgmental attitude and nonthreatening body language; listen carefully.  Assess and provide for unmet needs, including nutrition, comfort, hydration, hygiene, companionship and appropriate rest.  Utilize empathetic but firm and concise communication; set limits, offer choices and propose alternatives.  Remove stimuli and objects that may lead to harming self or others.  Maintain clear path to room exit; keep door open during care.  Ask directly about homicidal and suicidal intent; provide additional safety measures based on level of risk (e.g., one-on-one observation, duty to warn).  Implement least restrictive measures if attempts to de-escalate violent or injurious behaviors are unsuccessful.  Recent Flowsheet Documentation  Taken 12/27/2024 2300 by Geeta Soni RN  Enhanced Safety Measures: bed alarm set  Taken 12/27/2024 2100 by Geeta Soni RN  Enhanced Safety Measures: bed alarm set  Taken 12/27/2024 2020 by Geeta Soni RN  Enhanced Safety Measures: bed alarm set  Taken 12/27/2024 1900 by Geeta Soni RN  Enhanced Safety Measures: bed alarm set

## 2024-12-28 NOTE — PROGRESS NOTES
GOC at bedside with family and POA (son)- Son opted to pursue comfort measures.     Electronically signed by Anmol Walsh DO, 12/28/24, 3:48 PM EST.

## 2024-12-28 NOTE — SIGNIFICANT NOTE
Consult received. SLP attempted clinical dysphagia assessment, however, per d/w RN, assessment deferred. Patient with somonlence, inability to arouse to functionally participate. Per status, discussions of comfort care pending. Please continue universal aspiration precautions, MIGUEL precautions, oral care protocol. Please reconsult with improved medical status to allow for participation.     Thank you   Amy López M.S., CCC/SLP

## 2024-12-28 NOTE — PLAN OF CARE
Goal Outcome Evaluation:  Plan of Care Reviewed With: patient        Progress: no change  Outcome Evaluation: pt transitioned to comfort during shift.

## 2024-12-29 NOTE — PLAN OF CARE
Goal Outcome Evaluation:               Pt is resting well on comfort measure will continue to monitor and follow current plan of care.

## 2024-12-29 NOTE — PLAN OF CARE
Goal Outcome Evaluation:  Plan of Care Reviewed With: family        Progress: declining (comfort measures)  Outcome Evaluation: Patient on comfort measures.  Family at bedside.  No distress noted.  Patient resting in bed with eyes closed.  Call light within reach.  Bed locked and in lowest position.  Bed alarm in place.            Problem: Adult Inpatient Plan of Care  Goal: Plan of Care Review  Outcome: Adequate for Care Transition  Flowsheets (Taken 12/29/2024 0250)  Progress: (comfort measures) declining  Outcome Evaluation: Patient on comfort measures.  Family at bedside.  No distress noted.  Patient resting in bed with eyes closed.  Call light within reach.  Bed locked and in lowest position.  Bed alarm in place.  Plan of Care Reviewed With: family  Goal: Patient-Specific Goal (Individualized)  Outcome: Adequate for Care Transition  Goal: Absence of Hospital-Acquired Illness or Injury  Outcome: Adequate for Care Transition  Intervention: Identify and Manage Fall Risk  Description: Perform standard risk assessment on admission using a validated tool or comprehensive approach appropriate to the patient; reassess fall risk frequently, with change in status or transfer to another level of care.  Communicate risk to interprofessional healthcare team; ensure fall risk visible cue.  Determine need for increased observation, equipment and environmental modification, as well as use of supportive, nonskid footwear.  Adjust safety measures to individual needs and identified risk factors.  Reinforce the importance of active participation with fall risk prevention, safety, and physical activity with the patient and family.  Perform regular intentional rounding to assess need for position change, pain assessment and personal needs, including assistance with toileting.  Recent Flowsheet Documentation  Taken 12/29/2024 0100 by Geeta Soni RN  Safety Promotion/Fall Prevention: safety round/check completed  Taken 12/28/2024  2300 by Geeta Soni RN  Safety Promotion/Fall Prevention: safety round/check completed  Taken 12/28/2024 2100 by Geeta Soni RN  Safety Promotion/Fall Prevention: safety round/check completed  Taken 12/28/2024 1900 by Geeta Soni RN  Safety Promotion/Fall Prevention: safety round/check completed  Goal: Optimal Comfort and Wellbeing  Outcome: Adequate for Care Transition  Intervention: Provide Person-Centered Care  Description: Use a family-focused approach to care; encourage support system presence and participation.  Develop trust and rapport by proactively providing information, encouraging questions, addressing concerns and offering reassurance.  Acknowledge emotional response to hospitalization.  Recognize and utilize personal coping strategies and strengths; develop goals via shared decision-making.  Honor spiritual and cultural preferences.  Recent Flowsheet Documentation  Taken 12/28/2024 2000 by Geeta Soni RN  Trust Relationship/Rapport: care explained  Goal: Readiness for Transition of Care  Outcome: Adequate for Care Transition     Problem: Electrolyte Imbalance  Goal: Electrolyte Balance  Outcome: Adequate for Care Transition     Problem: Fall Injury Risk  Goal: Absence of Fall and Fall-Related Injury  Outcome: Adequate for Care Transition  Intervention: Identify and Manage Contributors  Description: Develop a fall prevention plan, considering patient-centered interventions and family/caregiver involvement; identify and address patient's facilitators and barriers.  Provide reorientation, appropriate sensory stimulation and routines with changes in mental status to decrease risk of fall.  Promote use of personal vision and auditory aids.  Assess assistance level required for safe and effective self-care; provide support as needed, such as toileting and mobilization. For age 65 and older, implement timed toileting with assistance.  Encourage physical activity, such as performance of  mobility and self-care at highest level of patient ability, multicomponent exercise program and provision of appropriate assistive devices.  If fall occurs, assess the severity of injury; implement fall injury protocol. Determine the cause and revise fall injury prevention plan.  Regularly review and advocate for medication adjustment to decrease fall risk; consider administration times, polypharmacy and age.  Balance adequate pain management with potential for oversedation.  Recent Flowsheet Documentation  Taken 12/29/2024 0100 by Geeta Soni RN  Medication Review/Management: medications reviewed  Taken 12/28/2024 2300 by Geeta Soni RN  Medication Review/Management: medications reviewed  Taken 12/28/2024 2100 by Geeta Soni RN  Medication Review/Management: medications reviewed  Taken 12/28/2024 2000 by Geeta Soni RN  Medication Review/Management: medications reviewed  Taken 12/28/2024 1900 by Geeta Soni RN  Medication Review/Management: medications reviewed  Intervention: Promote Injury-Free Environment  Description: Provide a safe, barrier-free environment that encourages independent activity.  Keep care area uncluttered and well-lighted.  Determine need for increased observation or monitoring.  Avoid use of devices that minimize mobility, such as restraints or indwelling urinary catheter.  Recent Flowsheet Documentation  Taken 12/29/2024 0100 by Geeta Soni RN  Safety Promotion/Fall Prevention: safety round/check completed  Taken 12/28/2024 2300 by Geeta Soni RN  Safety Promotion/Fall Prevention: safety round/check completed  Taken 12/28/2024 2100 by Geeta Soni RN  Safety Promotion/Fall Prevention: safety round/check completed  Taken 12/28/2024 1900 by Geeta Soni RN  Safety Promotion/Fall Prevention: safety round/check completed     Problem: Skin Injury Risk Increased  Goal: Skin Health and Integrity  Outcome: Adequate for Care Transition  Intervention: Optimize  Skin Protection  Description: Perform a full pressure injury risk assessment, as indicated by screening, upon admission to care unit.  Reassess skin (full inspection and injury risk, including skin temperature, consistency and color) frequently (e.g., scheduled interval, with change in condition) to provide optimal early detection and prevention.  Maintain adequate tissue perfusion (e.g., encourage fluid balance; avoid crossing legs, constrictive clothing or devices) to promote tissue oxygenation.  Maintain head of bed at lowest degree of elevation tolerated, considering medical condition and other restrictions. Use positioning supports to prevent sliding and friction. Consider low friction textiles.  Avoid positioning onto an area that remains reddened or on bony prominences.  Minimize incontinence and moisture (e.g., toileting schedule; moisture-wicking pad, diaper or incontinence collection device; skin moisture barrier).  Cleanse skin promptly and gently, when soiled, utilizing a pH-balanced cleanser.  Relieve and redistribute pressure (e.g., scheduled position changes, weight shifts, use of support surface, medical device repositioning, protective dressing application, use of positioning device, microclimate control, use of pressure-injury-monitor  Encourage increased activity, such as sitting in a chair at the bedside or early mobilization, when able to tolerate. Avoid prolonged sitting.  Recent Flowsheet Documentation  Taken 12/28/2024 2000 by Geeta Soni RN  Pressure Reduction Techniques: weight shift assistance provided  Pressure Reduction Devices: pressure-redistributing mattress utilized     Problem: Sepsis/Septic Shock  Goal: Optimal Coping  Outcome: Adequate for Care Transition  Goal: Absence of Bleeding  Outcome: Adequate for Care Transition  Goal: Blood Glucose Level Within Target Range  Outcome: Adequate for Care Transition  Goal: Absence of Infection Signs and Symptoms  Outcome: Adequate for  Care Transition  Goal: Optimal Nutrition Delivery  Outcome: Adequate for Care Transition     Problem: Violence Risk or Actual  Goal: Anger and Impulse Control  Outcome: Adequate for Care Transition  Intervention: Minimize Safety Risk  Description: Listen actively, observing verbal and nonverbal cues (e.g., irritability, confusion, lack of cooperation, demanding behavior, body posture, expression); take threats seriously.  Maintain a therapeutic presence; utilize calm, empathetic tone of voice, nonjudgmental attitude and nonthreatening body language; listen carefully.  Assess and provide for unmet needs, including nutrition, comfort, hydration, hygiene, companionship and appropriate rest.  Utilize empathetic but firm and concise communication; set limits, offer choices and propose alternatives.  Remove stimuli and objects that may lead to harming self or others.  Maintain clear path to room exit; keep door open during care.  Ask directly about homicidal and suicidal intent; provide additional safety measures based on level of risk (e.g., one-on-one observation, duty to warn).  Implement least restrictive measures if attempts to de-escalate violent or injurious behaviors are unsuccessful.  Recent Flowsheet Documentation  Taken 12/29/2024 0100 by Geeta Soni RN  Enhanced Safety Measures: bed alarm set  Taken 12/28/2024 2300 by Geeta Soni RN  Enhanced Safety Measures: bed alarm set  Taken 12/28/2024 2100 by Geeta Soni RN  Enhanced Safety Measures: bed alarm set  Taken 12/28/2024 1900 by Geeta Soni RN  Enhanced Safety Measures: bed alarm set

## 2024-12-29 NOTE — PLAN OF CARE
Goal Outcome Evaluation:   Pt resting in bed at this time. Pt is on comfort measures and have been continued throughout this shift.

## 2024-12-29 NOTE — PROGRESS NOTES
[facility] HOSPITALIST PROGRESS NOTE     Patient Identification:  Name:  Tsering Alberto  Age:  89 y.o.  Sex:  female  :  1935  MRN:  [id number]  Visit Number:  [id number]  ROOM: [address] [place] [address]     Primary Care Provider:  Nicolette Colbert MD    Length of stay in inpatient status:  2    Subjective     Chief Compliant:    Chief Complaint   Patient presents with    Cold Exposure    Altered Mental Status    Hypotension       History of Presenting Illness:    Patient seen in follow-up for metabolic encephalopathy, septic shock, now comfort measures.  Patient is resting comfortably, family at bedside.    Objective     Current Hospital Meds:Scopolamine, 1 patch, Transdermal, Q72H  sodium chloride, 10 mL, Intravenous, Q12H         Current Antimicrobial Therapy:  Anti-Infectives (From admission, onward)      Ordered     Dose/Rate Route Frequency Start Stop    24 1845  vancomycin 1000 mg/250 mL 0.9% NaCl IVPB  Status:  Discontinued        Ordering Provider: Anoml Walsh DO    1,000 mg  250 mL/hr over 60 Minutes Intravenous Every 24 Hours 24 1200 24 1119    24 1832  ertapenem (INVanz) MBP 1 g in 100 NS  Status:  Discontinued        Ordering Provider: Anmol Walsh DO    1 g  200 mL/hr over 30 Minutes Intravenous Every 24 Hours 24 0900 24 0814    24 0814  ertapenem (INVanz) 1,000 mg in sodium chloride 0.9 % 100 mL IVPB-VTB  Status:  Discontinued        Ordering Provider: Anmol Walsh DO    1,000 mg  200 mL/hr over 30 Minutes Intravenous Every 24 Hours 24 0900 24 1546    24 1832  Pharmacy to dose vancomycin  Status:  Discontinued        Ordering Provider: Anmol Walsh DO     Not Applicable Continuous PRN 24 1832 24 1845    24 0908  vancomycin 1250 mg/250 mL 0.9% NS IVPB ([facility])        Ordering Provider: Shayne Hancock MD    20 mg/kg × 67.8 kg  over 75 Minutes Intravenous Once  12/27/24 1030 12/27/24 1312    12/27/24 0908  aztreonam (AZACTAM) 2 g in sodium chloride 0.9 % 100 mL IVPB-VTB        Ordering Provider: Shayne Hancock MD    2 g  100 mL/hr over 60 Minutes Intravenous Once 12/27/24 0930 12/27/24 1150          Current Diuretic Therapy:  Diuretics (From admission, onward)      None          ----------------------------------------------------------------------------------------------------------------------  Vital Signs:  Temp:  [97.5 °F (36.4 °C)-98.6 °F (37 °C)] 97.5 °F (36.4 °C)  Heart Rate:  [67-83] 83  Resp:  [16-26] 16  BP: ()/(29-58) 78/34  SpO2:  [98 %-100 %] 99 %  on  Flow (L/min) (Oxygen Therapy):  [2] 2;   Device (Oxygen Therapy): nasal cannula  Body mass index is 22.17 kg/m².    Wt Readings from Last 3 Encounters:   12/28/24 70.1 kg (154 lb 8.7 oz)   12/20/24 67.8 kg (149 lb 6.4 oz)   12/09/24 70 kg (154 lb 5.2 oz)     Intake & Output (last 3 days)         12/25 0701 12/26 0700 12/26 0701 12/27 0700 12/27 0701 12/28 0700 12/28 0701 12/29 0700    I.V. (mL/kg)   100 (1.5)     IV Piggyback   350     Total Intake(mL/kg)   450 (6.6)     Urine (mL/kg/hr)   275     Total Output   275     Net   +175                   NPO Diet NPO Type: Strict NPO  ----------------------------------------------------------------------------------------------------------------------  Physical exam:  Constitutional: Chronically ill-appearing female, lethargic   HEENT: Normocephalic, dry mucous membranes  Cardiovascular:  Normal rate, regular rhythm   Pulmonary/Chest: No respiratory distress, unlabored  Neurological: Lethargic  Peripheral vascular: Palpable pulses    ----------------------------------------------------------------------------------------------------------------------  Results from last 7 days   Lab Units 12/28/24  0027 12/27/24  1124 12/27/24  1040 12/27/24  0942   CRP mg/dL  --  3.95*  --   --    LACTATE mmol/L 1.4  --   --  1.0   WBC 10*3/mm3 8.99  --   --  6.27  "  HEMOGLOBIN g/dL 9.3*  --   --  9.5*   HEMATOCRIT % 29.9*  --   --  31.3*   MCV fL 87.9  --   --  88.4   MCHC g/dL 31.1*  --   --  30.4*   PLATELETS 10*3/mm3 311  --   --  281   INR  1.85*  --  1.79*  --      Results from last 7 days   Lab Units 12/27/24  0921   PH, ARTERIAL pH units 7.380   PO2 ART mm Hg 70.5*   PCO2, ARTERIAL mm Hg 48.8*   HCO3 ART mmol/L 28.9*     Results from last 7 days   Lab Units 12/28/24  0027 12/27/24  0942   SODIUM mmol/L 152* 154*   POTASSIUM mmol/L 3.8 2.7*   MAGNESIUM mg/dL  --  1.1*   CHLORIDE mmol/L 115* 112*   CO2 mmol/L 23.4 27.4   BUN mg/dL 49* 51*   CREATININE mg/dL 1.32* 1.18*   CALCIUM mg/dL 7.4* 8.1*   GLUCOSE mg/dL 85 101*   ALBUMIN g/dL 2.8* 3.0*   BILIRUBIN mg/dL 0.2 <0.2   ALK PHOS U/L 118* 118*   AST (SGOT) U/L 35* 33*   ALT (SGPT) U/L 18 18   Estimated Creatinine Clearance: 32.6 mL/min (A) (by C-G formula based on SCr of 1.32 mg/dL (H)).  No results found for: \"AMMONIA\"  Results from last 7 days   Lab Units 12/27/24  1124 12/27/24  0942   CK TOTAL U/L 98  --    HSTROP T ng/L 47* 54*             No results found for: \"HGBA1C\", \"POCGLU\"  Lab Results   Component Value Date    TSH 2.830 12/27/2024     No results found for: \"PREGTESTUR\", \"PREGSERUM\", \"HCG\", \"HCGQUANT\"  Pain Management Panel           No data to display              Brief Urine Lab Results  (Last result in the past 365 days)        Color   Clarity   Blood   Leuk Est   Nitrite   Protein   CREAT   Urine HCG        12/27/24 0929 Dark Yellow   Clear   Negative   Negative   Negative   30 mg/dL (1+)                 Blood Culture   Date Value Ref Range Status   12/27/2024 No growth at 24 hours  Preliminary   12/27/2024 Abnormal Stain (C)  Preliminary     No results found for: \"URINECX\"  No results found for: \"WOUNDCX\"  No results found for: \"STOOLCX\"  No results found for: \"RESPCX\"  No results found for: \"AFBCX\"  Results from last 7 days   Lab Units 12/28/24  0027 12/27/24  1255 12/27/24  1124 12/27/24  0942 "   PROCALCITONIN ng/mL  --  0.08  --   --    LACTATE mmol/L 1.4  --   --  1.0   SED RATE mm/hr  --  50*  --   --    CRP mg/dL  --   --  3.95*  --        I have personally looked at the labs and they are summarized above.  ----------------------------------------------------------------------------------------------------------------------  Detailed radiology reports for the last 24 hours:  Imaging Results (Last 24 Hours)       ** No results found for the last 24 hours. **          Assessment & Plan      Patient is an 89-year-old female with history significant for type 2 diabetes mellitus, hypertension who presented to the ER from local nursing home with reports of altered mental status.     #Comfort measures  #Failure to thrive  #Hypernatremia due to free water deficit  #Acute metabolic encephalopathy  #Septic shock  #Community-acquired pneumonia versus aspiration with MDR risk factors  #Polymicrobial bacteremia, POA  #Sacral osteomyelitis, POA  #Coag negative staph bacteremia, contaminant  #Bilateral pleural effusions, monitor  #CAD status post remote CABG  #Elevated INR due to vitamin deficiency, IV vitamin K  #Chronic normocytic anemia  #Hypomagnesemia/hypokalemia    --Patient presented w/ reports of encephalopathy over the last few days from a local nursing facility.  Had recently been hospitalized with sacral osteomyelitis and had been on long-term antibiotics that have been scheduled to complete on 1/15  --Admission labs showed sodium 154, potassium 3.7, CRP 4, sed rate 50  --Blood cultures negative staph, suspected contaminant  --CT bibasilar consolidative disease consistent with pneumonia  --CT abdomen pelvis noted cholelithiasis without cholecystitis  --She was initially resumed on vancomycin and ertapenem given prior culture data, required levo due to persistent shock that was refractory to appropriate volume resuscitation.  She was placed on D5W due to free water deficit and hypernatremia which was felt  to have been playing a role in encephalopathy.  --Goals of care discussed at length on 12/28 bedside with patient's son and family present with nursing.  Son opted to transition to comfort measures  --As needed Ativan, morphine, Robinul for symptom relief  --Please do not consult wound care for any pressure injuries as patient is comfort care and should be made comfortable and allowed to pass with dignity.  --Patient also does not need oral care, turns or any other nursing driven protocol that would not be aligned with prioritizing patient comfort unless requested by family.  --Continue comfort measures    Checklist:  Antibiotics: none  Steroids: None  DVT ppx: None  GI ppx: None  Diet: N.p.o.  Code: No CPR, comfort  Risk/dispo: Patient transitioned to comfort measures, anticipate celestial discharge    Anmol Walsh DO  [facility]  12/29/24  11:12 EST

## 2024-12-30 NOTE — PROGRESS NOTES
Nutrition Services    Patient Name:  Tsering Alberto  YOB: 1935  MRN: [id number]  Admit Date:  12/27/2024    Pt noted to be comfort measures.    Will remain available.     Electronically signed by:  Kateryna Martínez RD  12/30/24 13:37 EST

## 2024-12-30 NOTE — PLAN OF CARE
Goal Outcome Evaluation:         Patient resting in bed during shift with family at bedside. Patient is comfort measures. No acute changes noted at this time. Will continue with plan of care.

## 2024-12-30 NOTE — DISCHARGE SUMMARY
Date of Death:  2024  Time of Death:  1518  Cause of Death: Septic shock  Final Diagnosis:   Septic shock  Bibasilar pneumonia, community acquired vs aspiration  Sacral osteomyelitis   Hypernatremia  Failure to thrive  Acute metabolic encephalopathy  Hypokalemia  Hypomagnesemia  Recent polymicrobial bacteremia  Normocytic anemia   Advanced age     Presenting Problem/History of Present Illness  Tsering Alberto was a 88 yo female who presented to the ED from local SNF for evaluation of AMS. Please see admission H&P for complete details.    In the ED, workup revealed sepsis, pneumonia, sacral osteomyelitis, recent polymicrobial bacteremia, hypernatremia, hypokalemia and hypomagnesemia. Cultures were obtained and IV antibiotics initiated. She was given IVF's per sepsis protocol as well.   Hospital Course  Tsering Alberto was admitted to the PCU for further evaluation and treatment. She was continued on broad spectrum IV antibiotics and maintenance IVF's in addition to vasopressor support.     Goals of care were discussed with pt's family changing code status from full code to DNR/DNI. Her vasopressor requirements escalated on admission and her family ultimately decided to transition to comfort measures approach after additional GOC discussions. Her medication regimen was adjusted to focus on symptom management and she was later transferred to the med/surg floor. Her symptoms remained controlled on PRN regimen and she later  on .     Procedures Performed: None        Consults:   Consults       Date and Time Order Name Status Description    2024  2:05 PM Inpatient General Surgery Consult Completed     2024  3:27 AM Inpatient Infectious Diseases Consult Completed               Geoffrey Greenberg DO  24  16:22 EST

## 2024-12-31 LAB — C AURIS DNA SPEC QL NAA+NON-PROBE: NOT DETECTED

## 2024-12-31 NOTE — PAYOR COMM NOTE
"  NPI:[id number]    Utilization Review  Contact: Susan Webb RN  Phone: [phone]  Fax:[phone]    DISCHARGE NOTIFICATION     REF # [id number].      Tsering Zacarias (Dcsd. Female)       Date of Birth   1935    Social Security Number       Address   [address] [place] [address]    Home Phone   [phone]    MRN   [id number]       Mandaen   Unknown    Marital Status   Unknown                            Admission Date   24    Admission Type   Emergency    Admitting Provider   Anmol Walsh DO    Attending Provider       Department, Room/Bed   [address], 0465/1P       Discharge Date   2024    Discharge Disposition       Discharge Destination   Other                              Attending Provider: (none)   Allergies: Penicillins    Isolation: None   Infection: Candida Auris (rule out) (24)   Code Status: Prior    Ht: 177.8 cm (70\")   Wt: 70.1 kg (154 lb 8.7 oz)    Admission Cmt: None   Principal Problem: None                  Active Insurance as of 2024       Primary Coverage       Payor Plan Insurance Group Employer/Plan Group    ANTHEM MEDICARE REPLACEMENT ANTHEM MEDICARE ADVANTAGE [id number]       Payor Plan Address Payor Plan Phone Number Payor Plan Fax Number Effective Dates    [phone]  2024 - None Entered    [facility] [address]         Subscriber Name Subscriber Birth Date Member ID       TSERING ZACARIAS 1935 [id number]               Secondary Coverage       Payor Plan Insurance Group Employer/Plan Group    [place]        Payor Plan Address Payor Plan Phone Number Payor Plan Fax Number Effective Dates    [address] [phone]  12/3/2024 - None Entered    [facility] [id number]         Subscriber Name Subscriber Birth Date Member ID       TSERING ZACARIAS 1935 [id number]                     Emergency Contacts        " (Rel.) Home Phone Work Phone Mobile Phone    Rajesh Alberto (Son) [phone] -- [phone]                 Discharge Summary        Geoffrey Greenberg DO at 24 1518             Date of Death:  2024  Time of Death:  151  Cause of Death: Septic shock  Final Diagnosis:   Septic shock  Bibasilar pneumonia, community acquired vs aspiration  Sacral osteomyelitis   Hypernatremia  Failure to thrive  Acute metabolic encephalopathy  Hypokalemia  Hypomagnesemia  Recent polymicrobial bacteremia  Normocytic anemia   Advanced age     Presenting Problem/History of Present Illness  Tsering Alberto was a 89 yo female who presented to the ED from local SNF for evaluation of AMS. Please see admission H&P for complete details.    In the ED, workup revealed sepsis, pneumonia, sacral osteomyelitis, recent polymicrobial bacteremia, hypernatremia, hypokalemia and hypomagnesemia. Cultures were obtained and IV antibiotics initiated. She was given IVF's per sepsis protocol as well.   Hospital Course  Tsering Alberto was admitted to the PCU for further evaluation and treatment. She was continued on broad spectrum IV antibiotics and maintenance IVF's in addition to vasopressor support.     Goals of care were discussed with pt's family changing code status from full code to DNR/DNI. Her vasopressor requirements escalated on admission and her family ultimately decided to transition to comfort measures approach after additional GOC discussions. Her medication regimen was adjusted to focus on symptom management and she was later transferred to the med/surg floor. Her symptoms remained controlled on PRN regimen and she later  on .     Procedures Performed: None        Consults:   Consults       Date and Time Order Name Status Description    2024  2:05 PM Inpatient General Surgery Consult Completed     2024  3:27 AM Inpatient Infectious Diseases Consult Completed               Geoffrey Greenberg    12/30/24  16:22 EST                    Electronically signed by Geoffrey Greenberg DO at 12/30/24 1953        yes

## 2025-01-01 LAB — BACTERIA SPEC AEROBE CULT: NORMAL

## (undated) DEVICE — PK BASIC 70

## (undated) DEVICE — GOWN,NON-REINFORCED,SIRUS,SET IN SLV,XXL: Brand: MEDLINE

## (undated) DEVICE — PENCL SMOKE/EVAC MEGADYNE TELESCP 10FT

## (undated) DEVICE — UNDERGLV SURG BIOGEL INDICAT PF 8 GRN

## (undated) DEVICE — GLV SURG PREMIERPRO MIC LTX PF SZ7.5 BRN

## (undated) DEVICE — DRAPE,EXTREMITY,89X128,STERILE: Brand: MEDLINE

## (undated) DEVICE — PATIENT RETURN ELECTRODE, SINGLE-USE, CONTACT QUALITY MONITORING, ADULT, WITH 9FT CORD, FOR PATIENTS WEIGING OVER 33LBS. (15KG): Brand: MEGADYNE

## (undated) DEVICE — SKIN PREP TRAY 4 COMPARTM TRAY: Brand: MEDLINE INDUSTRIES, INC.